# Patient Record
Sex: FEMALE | Race: WHITE | NOT HISPANIC OR LATINO | Employment: UNEMPLOYED | URBAN - METROPOLITAN AREA
[De-identification: names, ages, dates, MRNs, and addresses within clinical notes are randomized per-mention and may not be internally consistent; named-entity substitution may affect disease eponyms.]

---

## 2023-04-18 ENCOUNTER — OFFICE VISIT (OUTPATIENT)
Dept: PRIMARY CARE | Facility: CLINIC | Age: 88
End: 2023-04-18
Payer: MEDICARE

## 2023-04-18 VITALS
HEART RATE: 82 BPM | OXYGEN SATURATION: 98 % | WEIGHT: 103.6 LBS | BODY MASS INDEX: 21.65 KG/M2 | RESPIRATION RATE: 16 BRPM

## 2023-04-18 DIAGNOSIS — R53.83 FATIGUE, UNSPECIFIED TYPE: Primary | ICD-10-CM

## 2023-04-18 DIAGNOSIS — E78.9 LIPID DISORDER: ICD-10-CM

## 2023-04-18 PROCEDURE — 1159F MED LIST DOCD IN RCRD: CPT | Performed by: INTERNAL MEDICINE

## 2023-04-18 PROCEDURE — 1160F RVW MEDS BY RX/DR IN RCRD: CPT | Performed by: INTERNAL MEDICINE

## 2023-04-18 PROCEDURE — 99214 OFFICE O/P EST MOD 30 MIN: CPT | Performed by: INTERNAL MEDICINE

## 2023-04-18 RX ORDER — AMLODIPINE BESYLATE 10 MG/1
10 TABLET ORAL DAILY
COMMUNITY
End: 2023-05-05

## 2023-04-18 RX ORDER — APIXABAN 5 MG/1
2.5 TABLET, FILM COATED ORAL 2 TIMES DAILY
COMMUNITY
End: 2023-05-05

## 2023-04-18 RX ORDER — ATORVASTATIN CALCIUM 40 MG/1
40 TABLET, FILM COATED ORAL DAILY
COMMUNITY
End: 2023-05-03 | Stop reason: SDUPTHER

## 2023-04-18 ASSESSMENT — ENCOUNTER SYMPTOMS
OCCASIONAL FEELINGS OF UNSTEADINESS: 0
DEPRESSION: 0
LOSS OF SENSATION IN FEET: 0

## 2023-04-18 NOTE — PROGRESS NOTES
Subjective   Patient ID: Marian Fritz is a 89 y.o. female who presents for Follow-up.    HTN    HLD    CVA -Eliquis 2.5 mg BID    STML        HPI             Review of Systems      No Fever/chills/headaches/dizziness/chest pains/ shortness of breath/palpitations/Nausea/vomiting/diarrhea/ constipation/urine frequency/blood in urine.      Objective     130/80    Pulse 82   Resp 16   Wt 47 kg (103 lb 9.6 oz)   SpO2 98%   BMI 21.65 kg/m²     Physical Exam    No JVP elevation. No palpable Lymph Nodes. No Thyromegaly    CVS-NL S1/S2 . No MRG    Lungs-CTA. B/S= B/L    Abdomen-Soft, Non-tender. No masses or HSM    Extremities: No C/C/E      Assessment/Plan        HTN    HLD    CVA -Eliquis 2.5 mg BID    STML    Plan:    Continue with current Rx    Follow up in 3 months/PRN

## 2023-05-02 DIAGNOSIS — E78.00 ELEVATED CHOLESTEROL: ICD-10-CM

## 2023-05-02 DIAGNOSIS — Z92.29 HX OF LONG TERM USE OF BLOOD THINNERS: ICD-10-CM

## 2023-05-02 DIAGNOSIS — I10 HYPERTENSION, UNSPECIFIED TYPE: ICD-10-CM

## 2023-05-02 NOTE — TELEPHONE ENCOUNTER
Patient daughter called she was suppose to get a refill on atorvastatin 40 mg and furosemide 20 mg. Also her prescription on two of her medications were supposed to be changed to a lower dosage the daughter has been cutting them in half. The medications are eliquis 5mg and amlodepine 10mg.   [facility] [place].

## 2023-05-03 RX ORDER — FUROSEMIDE 20 MG/1
20 TABLET ORAL EVERY OTHER DAY
COMMUNITY
End: 2023-05-03 | Stop reason: SDUPTHER

## 2023-05-03 RX ORDER — ATORVASTATIN CALCIUM 40 MG/1
40 TABLET, FILM COATED ORAL DAILY
Qty: 30 TABLET | Refills: 2 | Status: SHIPPED | OUTPATIENT
Start: 2023-05-03 | End: 2023-05-05 | Stop reason: SDUPTHER

## 2023-05-03 RX ORDER — FUROSEMIDE 20 MG/1
20 TABLET ORAL EVERY OTHER DAY
Qty: 15 TABLET | Refills: 2 | Status: SHIPPED | OUTPATIENT
Start: 2023-05-03 | End: 2023-05-05 | Stop reason: SDUPTHER

## 2023-05-05 DIAGNOSIS — Z92.29 HX OF LONG TERM USE OF BLOOD THINNERS: ICD-10-CM

## 2023-05-05 DIAGNOSIS — E78.00 ELEVATED CHOLESTEROL: ICD-10-CM

## 2023-05-05 DIAGNOSIS — I10 HYPERTENSION, UNSPECIFIED TYPE: ICD-10-CM

## 2023-05-05 DIAGNOSIS — I10 PRIMARY HYPERTENSION: Primary | ICD-10-CM

## 2023-05-05 RX ORDER — AMLODIPINE BESYLATE 5 MG/1
5 TABLET ORAL DAILY
Qty: 90 TABLET | Refills: 3 | Status: SHIPPED | OUTPATIENT
Start: 2023-05-05 | End: 2023-10-24

## 2023-05-05 RX ORDER — ATORVASTATIN CALCIUM 40 MG/1
40 TABLET, FILM COATED ORAL DAILY
Qty: 30 TABLET | Refills: 2 | Status: SHIPPED | OUTPATIENT
Start: 2023-05-05 | End: 2023-07-24 | Stop reason: SDUPTHER

## 2023-05-05 RX ORDER — FUROSEMIDE 20 MG/1
20 TABLET ORAL EVERY OTHER DAY
Qty: 15 TABLET | Refills: 2 | Status: SHIPPED | OUTPATIENT
Start: 2023-05-05 | End: 2023-07-24 | Stop reason: SDUPTHER

## 2023-05-05 RX ORDER — ATORVASTATIN CALCIUM 40 MG/1
40 TABLET, FILM COATED ORAL DAILY
Qty: 90 TABLET | Refills: 0 | OUTPATIENT
Start: 2023-05-05 | End: 2023-08-03

## 2023-05-05 RX ORDER — FUROSEMIDE 20 MG/1
20 TABLET ORAL EVERY OTHER DAY
Qty: 45 TABLET | Refills: 0 | OUTPATIENT
Start: 2023-05-05 | End: 2023-08-03

## 2023-05-05 RX ORDER — AMLODIPINE BESYLATE 5 MG/1
5 TABLET ORAL DAILY
Qty: 90 TABLET | Refills: 0 | OUTPATIENT
Start: 2023-05-05 | End: 2024-05-04

## 2023-07-18 ENCOUNTER — OFFICE VISIT (OUTPATIENT)
Dept: PRIMARY CARE | Facility: CLINIC | Age: 88
End: 2023-07-18
Payer: MEDICARE

## 2023-07-18 VITALS — BODY MASS INDEX: 21.11 KG/M2 | WEIGHT: 101 LBS

## 2023-07-18 DIAGNOSIS — I48.11 LONGSTANDING PERSISTENT ATRIAL FIBRILLATION (MULTI): Primary | ICD-10-CM

## 2023-07-18 DIAGNOSIS — Z95.0 PACEMAKER: ICD-10-CM

## 2023-07-18 DIAGNOSIS — I10 BENIGN ESSENTIAL HTN: ICD-10-CM

## 2023-07-18 PROBLEM — Z85.3 PERSONAL HISTORY OF MALIGNANT NEOPLASM OF BREAST: Status: ACTIVE | Noted: 2023-07-18

## 2023-07-18 PROBLEM — I07.1 TRICUSPID VALVE REGURGITATION: Status: ACTIVE | Noted: 2023-07-18

## 2023-07-18 PROBLEM — R63.4 WEIGHT LOSS: Status: ACTIVE | Noted: 2023-07-18

## 2023-07-18 PROBLEM — R05.8 UPPER AIRWAY COUGH SYNDROME: Status: ACTIVE | Noted: 2023-07-18

## 2023-07-18 PROBLEM — M17.0 OSTEOARTHRITIS OF KNEES, BILATERAL: Status: ACTIVE | Noted: 2023-07-18

## 2023-07-18 PROBLEM — K21.9 ACID REFLUX: Status: ACTIVE | Noted: 2023-07-18

## 2023-07-18 PROBLEM — E87.5 HYPERKALEMIA: Status: ACTIVE | Noted: 2023-07-18

## 2023-07-18 PROBLEM — M16.11 OSTEOARTHRITIS OF RIGHT HIP: Status: ACTIVE | Noted: 2023-07-18

## 2023-07-18 PROBLEM — R05.9 COUGH: Status: ACTIVE | Noted: 2023-07-18

## 2023-07-18 PROBLEM — N94.89 ADNEXAL MASS: Status: ACTIVE | Noted: 2023-07-18

## 2023-07-18 PROBLEM — I50.9 CHF (CONGESTIVE HEART FAILURE) (MULTI): Status: ACTIVE | Noted: 2023-07-18

## 2023-07-18 PROBLEM — H61.92 LESION OF SKIN OF LEFT EAR: Status: ACTIVE | Noted: 2023-07-18

## 2023-07-18 PROBLEM — G40.109 PARTIAL SEIZURE, MOTOR (MULTI): Status: ACTIVE | Noted: 2023-07-18

## 2023-07-18 PROBLEM — M70.62 GREATER TROCHANTERIC BURSITIS, LEFT: Status: ACTIVE | Noted: 2023-07-18

## 2023-07-18 PROBLEM — I63.9 CVA (CEREBRAL VASCULAR ACCIDENT) (MULTI): Status: ACTIVE | Noted: 2023-07-18

## 2023-07-18 PROBLEM — M85.80 OSTEOPENIA: Status: ACTIVE | Noted: 2023-07-18

## 2023-07-18 PROBLEM — R19.00 PELVIC MASS: Status: ACTIVE | Noted: 2023-07-18

## 2023-07-18 PROBLEM — R79.89 ELEVATED TSH: Status: ACTIVE | Noted: 2023-07-18

## 2023-07-18 PROBLEM — H90.3 SENSORINEURAL HEARING LOSS (SNHL) OF BOTH EARS: Status: ACTIVE | Noted: 2023-07-18

## 2023-07-18 PROBLEM — M19.90 UNSPECIFIED OSTEOARTHRITIS, UNSPECIFIED SITE: Status: ACTIVE | Noted: 2023-07-18

## 2023-07-18 PROBLEM — I44.30 ATRIOVENTRICULAR BLOCK: Status: ACTIVE | Noted: 2023-07-18

## 2023-07-18 PROBLEM — R60.9 EDEMA: Status: ACTIVE | Noted: 2023-07-18

## 2023-07-18 PROBLEM — J18.9 PNEUMONIA: Status: ACTIVE | Noted: 2023-07-18

## 2023-07-18 PROBLEM — G31.84 MILD COGNITIVE IMPAIRMENT: Status: ACTIVE | Noted: 2023-07-18

## 2023-07-18 PROBLEM — I48.91 A-FIB (MULTI): Status: ACTIVE | Noted: 2023-07-18

## 2023-07-18 PROBLEM — J90 PLEURAL EFFUSION: Status: ACTIVE | Noted: 2023-07-18

## 2023-07-18 PROBLEM — R26.9 GAIT ABNORMALITY: Status: ACTIVE | Noted: 2023-07-18

## 2023-07-18 PROBLEM — M25.551 RIGHT HIP PAIN: Status: ACTIVE | Noted: 2023-07-18

## 2023-07-18 PROBLEM — M25.569 KNEE PAIN: Status: ACTIVE | Noted: 2023-07-18

## 2023-07-18 PROBLEM — I44.39 HIGH DEGREE ATRIOVENTRICULAR BLOCK: Status: ACTIVE | Noted: 2023-07-18

## 2023-07-18 PROBLEM — I63.9 EMBOLIC STROKE (MULTI): Status: ACTIVE | Noted: 2023-07-18

## 2023-07-18 PROBLEM — I63.513: Status: ACTIVE | Noted: 2023-07-18

## 2023-07-18 PROBLEM — M79.89 SWELLING OF EXTREMITY: Status: ACTIVE | Noted: 2023-07-18

## 2023-07-18 PROBLEM — I82.409 DVT (DEEP VENOUS THROMBOSIS) (MULTI): Status: ACTIVE | Noted: 2023-07-18

## 2023-07-18 PROBLEM — R47.81 DEFICIT IN COMMUNICATION DUE TO SLURRED SPEECH: Status: ACTIVE | Noted: 2023-07-18

## 2023-07-18 PROBLEM — M79.652 PAIN OF LEFT LATERAL UPPER THIGH: Status: ACTIVE | Noted: 2023-07-18

## 2023-07-18 PROBLEM — S72.142D: Status: ACTIVE | Noted: 2023-07-18

## 2023-07-18 PROBLEM — R42 DIZZINESS: Status: ACTIVE | Noted: 2023-07-18

## 2023-07-18 PROBLEM — R56.9 SEIZURE (MULTI): Status: ACTIVE | Noted: 2023-07-18

## 2023-07-18 PROBLEM — M54.50 LOW BACK PAIN: Status: ACTIVE | Noted: 2023-07-18

## 2023-07-18 PROBLEM — M79.10 MYALGIA: Status: ACTIVE | Noted: 2023-07-18

## 2023-07-18 PROBLEM — R07.89 ATYPICAL CHEST PAIN: Status: ACTIVE | Noted: 2023-07-18

## 2023-07-18 PROBLEM — E78.5 HYPERLIPIDEMIA: Status: ACTIVE | Noted: 2023-07-18

## 2023-07-18 PROBLEM — M65.332 TRIGGER MIDDLE FINGER OF LEFT HAND: Status: ACTIVE | Noted: 2023-07-18

## 2023-07-18 PROBLEM — M70.70 HIP BURSITIS: Status: ACTIVE | Noted: 2023-07-18

## 2023-07-18 PROBLEM — E83.52 SERUM CALCIUM ELEVATED: Status: ACTIVE | Noted: 2023-07-18

## 2023-07-18 PROBLEM — I50.33 ACUTE ON CHRONIC DIASTOLIC CONGESTIVE HEART FAILURE (MULTI): Status: ACTIVE | Noted: 2023-07-18

## 2023-07-18 PROCEDURE — 1160F RVW MEDS BY RX/DR IN RCRD: CPT | Performed by: INTERNAL MEDICINE

## 2023-07-18 PROCEDURE — 1159F MED LIST DOCD IN RCRD: CPT | Performed by: INTERNAL MEDICINE

## 2023-07-18 PROCEDURE — 99214 OFFICE O/P EST MOD 30 MIN: CPT | Performed by: INTERNAL MEDICINE

## 2023-07-18 PROCEDURE — 1126F AMNT PAIN NOTED NONE PRSNT: CPT | Performed by: INTERNAL MEDICINE

## 2023-07-18 RX ORDER — CARVEDILOL 6.25 MG/1
TABLET ORAL
COMMUNITY
End: 2023-07-29 | Stop reason: SDUPTHER

## 2023-07-18 RX ORDER — ELECTROLYTES/DEXTROSE
SOLUTION, ORAL ORAL
Status: ON HOLD | COMMUNITY
Start: 2016-05-16

## 2023-07-18 RX ORDER — EPINEPHRINE 0.22MG
AEROSOL WITH ADAPTER (ML) INHALATION
COMMUNITY
Start: 2016-05-16 | End: 2023-10-24

## 2023-07-18 RX ORDER — POTASSIUM CHLORIDE 750 MG/1
10 TABLET, EXTENDED RELEASE ORAL EVERY OTHER DAY
COMMUNITY
End: 2023-07-29 | Stop reason: SDUPTHER

## 2023-07-18 RX ORDER — LEVETIRACETAM 750 MG/1
750 TABLET ORAL 2 TIMES DAILY
COMMUNITY
End: 2023-07-29 | Stop reason: SDUPTHER

## 2023-07-18 RX ORDER — CHOLECALCIFEROL (VITAMIN D3) 25 MCG
1 TABLET ORAL DAILY
Status: ON HOLD | COMMUNITY
Start: 2014-12-01

## 2023-07-18 RX ORDER — LOSARTAN POTASSIUM 50 MG/1
50 TABLET ORAL DAILY
COMMUNITY
End: 2023-07-29 | Stop reason: SDUPTHER

## 2023-07-18 RX ORDER — PANTOPRAZOLE SODIUM 20 MG/1
20 TABLET, DELAYED RELEASE ORAL DAILY
COMMUNITY
End: 2023-07-29 | Stop reason: SDUPTHER

## 2023-07-18 NOTE — PROGRESS NOTES
Subjective   Patient ID: Marian Fritz is a 89 y.o. female who presents for Follow-up (3 months).    Afib    HTN    ANGEL      HPI     Review of Systems      No Fever/chills/headaches/dizziness/chest pains/ shortness of breath/palpitations/Nausea/vomiting/diarrhea/ constipation/urine frequency/blood in urine.      Objective   Wt 45.8 kg (101 lb)   BMI 21.11 kg/m²     Physical Exam    No JVP elevation. No palpable Lymph Nodes. No Thyromegaly    CVS-NL S1/S2 . No MRG    Lungs-CTA. B/S= B/L    Abdomen-Soft, Non-tender. No masses or HSM    Extremities: No C/C/E      Assessment/Plan     Afib    HTN    ANGEL    Plan:    Continue with current Rx    Follow up 3 months/PRN

## 2023-07-24 DIAGNOSIS — Z92.29 HX OF LONG TERM USE OF BLOOD THINNERS: ICD-10-CM

## 2023-07-24 DIAGNOSIS — E78.00 ELEVATED CHOLESTEROL: ICD-10-CM

## 2023-07-24 RX ORDER — FUROSEMIDE 20 MG/1
20 TABLET ORAL EVERY OTHER DAY
Qty: 15 TABLET | Refills: 2 | Status: SHIPPED | OUTPATIENT
Start: 2023-07-24 | End: 2023-10-24

## 2023-07-24 RX ORDER — ATORVASTATIN CALCIUM 40 MG/1
40 TABLET, FILM COATED ORAL DAILY
Qty: 30 TABLET | Refills: 2 | Status: SHIPPED | OUTPATIENT
Start: 2023-07-24 | End: 2023-10-30 | Stop reason: SDUPTHER

## 2023-07-29 DIAGNOSIS — I10 BENIGN ESSENTIAL HTN: ICD-10-CM

## 2023-07-29 DIAGNOSIS — R56.9 SEIZURE (MULTI): Primary | ICD-10-CM

## 2023-07-29 DIAGNOSIS — K21.9 GASTROESOPHAGEAL REFLUX DISEASE WITHOUT ESOPHAGITIS: ICD-10-CM

## 2023-07-30 RX ORDER — CARVEDILOL 6.25 MG/1
TABLET ORAL
Qty: 180 TABLET | Refills: 3 | Status: SHIPPED | OUTPATIENT
Start: 2023-07-30 | End: 2024-01-22 | Stop reason: SDUPTHER

## 2023-07-30 RX ORDER — LOSARTAN POTASSIUM 50 MG/1
50 TABLET ORAL DAILY
Qty: 90 TABLET | Refills: 3 | Status: SHIPPED | OUTPATIENT
Start: 2023-07-30 | End: 2024-01-22 | Stop reason: SDUPTHER

## 2023-07-30 RX ORDER — LEVETIRACETAM 750 MG/1
750 TABLET ORAL 2 TIMES DAILY
Qty: 180 TABLET | Refills: 3 | Status: SHIPPED | OUTPATIENT
Start: 2023-07-30 | End: 2024-01-22 | Stop reason: SDUPTHER

## 2023-07-30 RX ORDER — PANTOPRAZOLE SODIUM 20 MG/1
20 TABLET, DELAYED RELEASE ORAL DAILY
Qty: 90 TABLET | Refills: 3 | Status: SHIPPED | OUTPATIENT
Start: 2023-07-30 | End: 2024-01-22 | Stop reason: SDUPTHER

## 2023-07-30 RX ORDER — POTASSIUM CHLORIDE 750 MG/1
10 TABLET, EXTENDED RELEASE ORAL EVERY OTHER DAY
Qty: 45 TABLET | Refills: 3 | Status: SHIPPED | OUTPATIENT
Start: 2023-07-30 | End: 2024-01-22 | Stop reason: SDUPTHER

## 2023-09-24 PROBLEM — D48.5 NEOPLASM OF UNCERTAIN BEHAVIOR OF SKIN: Status: ACTIVE | Noted: 2018-10-24

## 2023-09-24 PROBLEM — L82.1 OTHER SEBORRHEIC KERATOSIS: Status: ACTIVE | Noted: 2018-10-24

## 2023-09-24 PROBLEM — M81.0 OSTEOPOROSIS: Status: ACTIVE | Noted: 2023-09-24

## 2023-09-24 PROBLEM — L57.9 SKIN CHANGES DUE TO CHRONIC EXPOSURE TO NONIONIZING RADIATION, UNSPECIFIED: Status: ACTIVE | Noted: 2018-10-24

## 2023-09-24 PROBLEM — D18.01 HEMANGIOMA OF SKIN AND SUBCUTANEOUS TISSUE: Status: ACTIVE | Noted: 2018-10-24

## 2023-09-24 RX ORDER — ALENDRONATE SODIUM 70 MG/1
70 TABLET ORAL WEEKLY
COMMUNITY
Start: 2011-12-27 | End: 2023-10-24 | Stop reason: WASHOUT

## 2023-09-24 RX ORDER — CLOPIDOGREL BISULFATE 75 MG/1
75 TABLET ORAL
COMMUNITY
Start: 2018-09-17 | End: 2023-10-24 | Stop reason: WASHOUT

## 2023-09-24 RX ORDER — BUTALB/ACETAMINOPHEN/CAFFEINE 50-325-40
1 TABLET ORAL 3 TIMES DAILY
COMMUNITY
Start: 2003-04-29 | End: 2023-10-24 | Stop reason: WASHOUT

## 2023-09-24 RX ORDER — VITS A,C,E/LUTEIN/MINERALS 300MCG-200
2 TABLET ORAL DAILY
COMMUNITY
Start: 2014-12-01 | End: 2023-10-24 | Stop reason: WASHOUT

## 2023-09-24 RX ORDER — LISINOPRIL 40 MG/1
40 TABLET ORAL
COMMUNITY
Start: 2011-11-07 | End: 2023-10-24 | Stop reason: WASHOUT

## 2023-09-24 RX ORDER — ASPIRIN 325 MG
325 TABLET, DELAYED RELEASE (ENTERIC COATED) ORAL
COMMUNITY
Start: 2011-09-09 | End: 2023-10-24 | Stop reason: ALTCHOICE

## 2023-09-24 RX ORDER — LEVETIRACETAM 250 MG/1
250 TABLET ORAL 2 TIMES DAILY
COMMUNITY
Start: 2011-09-09 | End: 2023-10-24 | Stop reason: WASHOUT

## 2023-09-24 RX ORDER — VIT A/VIT C/VIT E/ZINC/COPPER 4296-226
1 CAPSULE ORAL 2 TIMES DAILY
Status: ON HOLD | COMMUNITY

## 2023-09-24 RX ORDER — NAPROXEN 500 MG/1
500 TABLET ORAL 2 TIMES DAILY PRN
COMMUNITY
Start: 2011-11-07 | End: 2023-10-24 | Stop reason: WASHOUT

## 2023-09-24 RX ORDER — ASPIRIN 81 MG/1
1 TABLET ORAL DAILY
COMMUNITY
End: 2023-10-24 | Stop reason: ALTCHOICE

## 2023-09-24 RX ORDER — HYDROCHLOROTHIAZIDE 25 MG/1
25 TABLET ORAL
COMMUNITY
Start: 2011-07-15 | End: 2023-10-24 | Stop reason: WASHOUT

## 2023-09-24 RX ORDER — ATORVASTATIN CALCIUM 10 MG/1
10 TABLET, FILM COATED ORAL
COMMUNITY
Start: 2018-09-10 | End: 2023-10-24 | Stop reason: WASHOUT

## 2023-09-24 RX ORDER — ATORVASTATIN CALCIUM 20 MG/1
20 TABLET, FILM COATED ORAL DAILY
COMMUNITY
Start: 2011-01-25 | End: 2023-10-24 | Stop reason: WASHOUT

## 2023-09-24 RX ORDER — ATENOLOL 100 MG/1
100 TABLET ORAL
COMMUNITY
Start: 2011-11-07 | End: 2023-10-24 | Stop reason: ALTCHOICE

## 2023-09-24 RX ORDER — MELOXICAM 7.5 MG/1
7.5 TABLET ORAL DAILY PRN
COMMUNITY
End: 2023-10-24 | Stop reason: WASHOUT

## 2023-10-18 ENCOUNTER — APPOINTMENT (OUTPATIENT)
Dept: PRIMARY CARE | Facility: CLINIC | Age: 88
End: 2023-10-18
Payer: MEDICARE

## 2023-10-20 ENCOUNTER — OFFICE VISIT (OUTPATIENT)
Dept: PRIMARY CARE | Facility: CLINIC | Age: 88
End: 2023-10-20
Payer: MEDICARE

## 2023-10-20 ENCOUNTER — TELEPHONE (OUTPATIENT)
Dept: PRIMARY CARE | Facility: CLINIC | Age: 88
End: 2023-10-20

## 2023-10-20 VITALS
RESPIRATION RATE: 18 BRPM | OXYGEN SATURATION: 97 % | HEART RATE: 71 BPM | WEIGHT: 99.8 LBS | BODY MASS INDEX: 20.86 KG/M2

## 2023-10-20 DIAGNOSIS — I63.9 CEREBROVASCULAR ACCIDENT (CVA), UNSPECIFIED MECHANISM (MULTI): Primary | ICD-10-CM

## 2023-10-20 DIAGNOSIS — E78.00 ELEVATED CHOLESTEROL: ICD-10-CM

## 2023-10-20 DIAGNOSIS — I10 BENIGN ESSENTIAL HTN: ICD-10-CM

## 2023-10-20 DIAGNOSIS — Z23 ENCOUNTER FOR IMMUNIZATION: ICD-10-CM

## 2023-10-20 DIAGNOSIS — E78.9 LIPID DISORDER: ICD-10-CM

## 2023-10-20 PROCEDURE — 99214 OFFICE O/P EST MOD 30 MIN: CPT | Performed by: INTERNAL MEDICINE

## 2023-10-20 PROCEDURE — G0008 ADMIN INFLUENZA VIRUS VAC: HCPCS | Performed by: INTERNAL MEDICINE

## 2023-10-20 PROCEDURE — 3078F DIAST BP <80 MM HG: CPT | Performed by: INTERNAL MEDICINE

## 2023-10-20 PROCEDURE — 90662 IIV NO PRSV INCREASED AG IM: CPT | Performed by: INTERNAL MEDICINE

## 2023-10-20 PROCEDURE — 1159F MED LIST DOCD IN RCRD: CPT | Performed by: INTERNAL MEDICINE

## 2023-10-20 PROCEDURE — 1160F RVW MEDS BY RX/DR IN RCRD: CPT | Performed by: INTERNAL MEDICINE

## 2023-10-20 PROCEDURE — 3074F SYST BP LT 130 MM HG: CPT | Performed by: INTERNAL MEDICINE

## 2023-10-20 PROCEDURE — 1126F AMNT PAIN NOTED NONE PRSNT: CPT | Performed by: INTERNAL MEDICINE

## 2023-10-20 NOTE — PROGRESS NOTES
Subjective   Patient ID: Marian Fritz is a 89 y.o. female who presents for Follow-up (3 months).    No medical changes since her last office visit-     HPI     HTN    HLD    H/o Seizures-       Review of Systems    [place]    No Fever/chills/headaches/dizziness/chest pains/ cough/ shortness of breath/palpitations/Nausea/vomiting/diarrhea/ constipation/urine frequency/blood in urine.      Objective   Pulse 71   Resp 18   Wt 45.3 kg (99 lb 12.8 oz)   SpO2 97%   BMI 20.86 kg/m²     Physical Exam    No JVP elevation. No palpable Lymph Nodes. No Thyromegaly    HEENT- Negative    CVS-NL S1/S2 . No MRG    Lungs-CTA. B/S= B/L    Abdomen-Soft, Non-tender. No masses or HSM    Extremities: No C/C/E        Assessment/Plan     HTN    HLD    CVA    H/o Seizures-     Plan:    Continue with current Rx

## 2023-10-24 ENCOUNTER — OFFICE VISIT (OUTPATIENT)
Dept: CARDIOLOGY | Facility: CLINIC | Age: 88
End: 2023-10-24
Payer: MEDICARE

## 2023-10-24 VITALS
HEART RATE: 73 BPM | DIASTOLIC BLOOD PRESSURE: 68 MMHG | WEIGHT: 101.31 LBS | OXYGEN SATURATION: 96 % | BODY MASS INDEX: 21.26 KG/M2 | HEIGHT: 58 IN | SYSTOLIC BLOOD PRESSURE: 121 MMHG

## 2023-10-24 DIAGNOSIS — I48.91 ATRIAL FIBRILLATION, UNSPECIFIED TYPE (MULTI): Primary | ICD-10-CM

## 2023-10-24 PROCEDURE — 99214 OFFICE O/P EST MOD 30 MIN: CPT | Performed by: INTERNAL MEDICINE

## 2023-10-24 PROCEDURE — 3074F SYST BP LT 130 MM HG: CPT | Performed by: INTERNAL MEDICINE

## 2023-10-24 PROCEDURE — 1159F MED LIST DOCD IN RCRD: CPT | Performed by: INTERNAL MEDICINE

## 2023-10-24 PROCEDURE — 99214 OFFICE O/P EST MOD 30 MIN: CPT | Mod: PO | Performed by: INTERNAL MEDICINE

## 2023-10-24 PROCEDURE — 1126F AMNT PAIN NOTED NONE PRSNT: CPT | Performed by: INTERNAL MEDICINE

## 2023-10-24 PROCEDURE — 3078F DIAST BP <80 MM HG: CPT | Performed by: INTERNAL MEDICINE

## 2023-10-24 PROCEDURE — 1160F RVW MEDS BY RX/DR IN RCRD: CPT | Performed by: INTERNAL MEDICINE

## 2023-10-24 ASSESSMENT — COLUMBIA-SUICIDE SEVERITY RATING SCALE - C-SSRS
2. HAVE YOU ACTUALLY HAD ANY THOUGHTS OF KILLING YOURSELF?: NO
1. IN THE PAST MONTH, HAVE YOU WISHED YOU WERE DEAD OR WISHED YOU COULD GO TO SLEEP AND NOT WAKE UP?: NO

## 2023-10-24 ASSESSMENT — ENCOUNTER SYMPTOMS
OCCASIONAL FEELINGS OF UNSTEADINESS: 0
DEPRESSION: 0
LOSS OF SENSATION IN FEET: 0

## 2023-10-24 ASSESSMENT — PAIN SCALES - GENERAL: PAINLEVEL: 0-NO PAIN

## 2023-10-24 NOTE — PROGRESS NOTES
Primary Care Physician: Pro Gomez MD  Date of Visit: 10/24/2023  1:40 PM EDT  Location of visit: [facility] [place] [address]     Chief Complaint:   Chief Complaint   Patient presents with    Follow-up        HPI / Summary:   Marian Fritz is a 89 y.o. female presents for followup.     89-year-old history of A-fib, pacemaker, TIA, sensorineural hearing loss, diastolic heart failure, TR, pulmonary hypertension.  She lives with one of her daughters no recent falls no recent clinical bleeding remains adherent to prescribed meds        Specialty Problems          Cardiology Problems    A-fib (CMS/HCC)    Acute on chronic diastolic congestive heart failure (CMS/HCC)    Atrioventricular block    Benign essential HTN    CHF (congestive heart failure) (CMS/HCC)    DVT (deep venous thrombosis) (CMS/HCC)    High degree atrioventricular block    Hyperlipidemia    Pacemaker    Tricuspid valve regurgitation        Past Medical History:   Diagnosis Date    Personal history of other diseases of the circulatory system     History of hypertension    Personal history of other diseases of the nervous system and sense organs 12/16/2013    History of complex partial epilepsy    Pure hypercholesterolemia, unspecified     High cholesterol    Unspecified cataract     Cataracts, both eyes    Unspecified convulsions (CMS/HCC)     Seizures          Past Surgical History:   Procedure Laterality Date    CATARACT EXTRACTION  12/01/2014    Cataract Surgery    CT HEAD ANGIO W AND WO IV CONTRAST  9/16/2018    CT HEAD ANGIO W AND WO IV CONTRAST 9/16/2018 [facility] EMERGENCY LEGACY    CT NECK ANGIO W AND WO IV CONTRAST  9/16/2018    CT NECK ANGIO W AND WO IV CONTRAST 9/16/2018 [facility] EMERGENCY LEGACY    MR HEAD ANGIO WO IV CONTRAST  11/28/2018    MR HEAD ANGIO WO IV CONTRAST 11/28/2018 [facility] [place] INPATIENT LEGACY    MR NECK ANGIO WO IV CONTRAST  11/28/2018    MR NECK ANGIO WO IV CONTRAST 11/28/2018 [facility] [place] INPATIENT LEGACY    OTHER SURGICAL HISTORY  12/01/2014    Simple  Mastectomy Right Breast          Social History:   reports that she has never smoked. She has never used smokeless tobacco.      Allergies:  Allergies   Allergen Reactions    Amlodipine Other     Leg edema    Bee Venom Protein (Honey Bee) Other       Outpatient Medications:  Current Outpatient Medications   Medication Instructions    amLODIPine (NORVASC) 5 mg, oral, Daily    apixaban (ELIQUIS) 2.5 mg, oral, 2 times daily    atorvastatin (LIPITOR) 40 mg, oral, Daily    atorvastatin (LIPITOR) 10 mg, oral, Daily RT    atorvastatin (LIPITOR) 20 mg, oral, Daily    biotin 5 mg capsule oral    calcium citrate-vitamin D3 (Citracal+D) 315 mg-5 mcg (200 unit) tablet 1 tablet, 3 times daily    carvedilol (Coreg) 6.25 mg tablet take one tablet every morning and evening.    cholecalciferol (Vitamin D-3) 25 MCG (1000 UT) tablet 1 tablet, oral, Daily    clopidogrel (PLAVIX) 75 mg, oral, Daily RT    coenzyme Q-10 100 mg capsule oral    diclofenac sodium 4 g, 4 times daily,  APPLY TO LOWER EXTREMITIES. DO NOT APPLY MORE THAN 16 GM DAILY TO ANY ONE AFFECTED JOINT.    furosemide (LASIX) 20 mg, oral, Every other day    hydroCHLOROthiazide (HYDRODIURIL) 25 mg, oral, Daily RT    levETIRAcetam (KEPPRA) 750 mg, oral, 2 times daily    levETIRAcetam (KEPPRA) 250 mg, oral, 2 times daily    lisinopril 40 mg, oral, Daily RT    losartan (COZAAR) 50 mg, oral, Daily    meloxicam (MOBIC) 7.5 mg, oral, Daily PRN    naproxen (NAPROSYN) 500 mg, oral, 2 times daily PRN, TAKE WITH FOOD.    pantoprazole (PROTONIX) 20 mg, oral, Daily    potassium chloride CR 10 mEq ER tablet 10 mEq, oral, Every other day    vit A,C and E-lutein-minerals (Ocuvite with Lutein) 300 mcg-200 mg-27 mg-2 mg tablet 2 tablets, oral, Daily    vitamins A,C,E-zinc-copper (PreserVision AREDS) 4,296 mcg-226 mg-90 mg capsule 1 capsule, oral, 2 times daily       ROS     Physical Exam:  Vitals:    10/24/23 1411   BP: 121/68   BP Location: Left arm   Patient Position: Sitting   Pulse: 73  "  SpO2: 96%   Weight: 46 kg (101 lb 5 oz)   Height: 1.473 m (4' 10\")     Wt Readings from Last 5 Encounters:   10/24/23 46 kg (101 lb 5 oz)   10/20/23 45.3 kg (99 lb 12.8 oz)   07/18/23 45.8 kg (101 lb)   04/24/23 45.8 kg (101 lb)   04/18/23 47 kg (103 lb 9.6 oz)     Body mass index is 21.17 kg/m².   No acute distress.  Flat JVP.  Regular rhythm.  Clear lungs.  Soft abdomen.  Trace ankle edema     Last Labs:  CMP:  Recent Labs     10/06/22  1439 06/16/22  0555 06/13/22  1145 06/12/22  0644 06/11/22  0643    140 138 140 138   K 3.6 3.9 3.7 3.5 3.5    98 96* 100 100   CO2 32 32 33* 30 28   ANIONGAP 13 14 13 14 14   BUN 10 15 20 15 17   CREATININE 0.84 0.93 1.03 0.89 0.89   GLUCOSE 102* 79 126* 99 104*     Recent Labs     10/06/22  1439 06/13/22  1145 06/08/22  1637 08/27/21  1032 11/30/20  1327   ALBUMIN 4.0 3.3* 3.5 3.8 3.7   ALKPHOS 81 55 55 66 49   ALT 12 21 18 10 13   AST 13 20 17 14 12   BILITOT 0.9 0.7 1.0 0.7 0.7     CBC:  Recent Labs     10/06/22  1439 06/16/22  0555 06/13/22  1145 06/12/22  0644 06/11/22  0643   WBC 9.5 7.5 10.0 7.2 8.6   HGB 15.0 13.7 13.9 14.2 13.7   HCT 45.2 41.5 42.5 43.9 41.7    271 267 239 240   MCV 94 95 94 96 95     COAG:   Recent Labs     10/06/22  1615 07/01/19  1124 05/10/19  1449 11/27/18  0607 11/22/18  0545   INR 1.9* 1.3* 1.2* 1.2* 1.3*     HEME/ENDO:  Recent Labs     06/09/22  0554 06/08/22  1637 08/27/21  1032 11/30/20  1327 10/04/19  1239 07/02/19  0555 07/01/19  1124 05/11/19  0616 12/17/18  0700 09/17/18  0620   IRONSAT  --   --   --   --   --   --   --   --  18*  --    TSH  --  1.71 2.17 2.43 3.21  --    < >  --   --   --    HGBA1C 6.5*  --   --   --   --  5.4  --  5.8  --  5.7    < > = values in this interval not displayed.      CARDIAC:   Recent Labs     10/06/22  1615 10/06/22  1439 06/09/22  0554 06/08/22  1637 07/02/19  0555 07/01/19  1124 12/17/18  0700 12/10/18  0548 09/16/18  1332 01/16/18  1250   LDH  --   --   --   --   --   --   --   --   --  " 236   TROPHS 13 14* 47* 36*  --   --   --   --   --   --    BNP  --  218*  --   --  622* 577* 402* 442*   < >  --     < > = values in this interval not displayed.     Recent Labs     06/09/22  0554 08/27/21  1032 11/30/20  1327   CHOL 152 157 194   LDLF 79 79 91   HDL 55.0 57.3 66.0   TRIG 89 103 183*       Last Cardiology Tests:  ECG:      Echo:  Echo Results:  No results found for this or any previous visit from the past 3650 days.       Cath:      Stress Test:  Stress Results:  No results found for this or any previous visit from the past 365 days.         Cardiac Imaging:  Electrocardiogram 12 Lead  Ventricular-paced rhythm  Abnormal ECG  When compared with ECG of 25-JUL-2022 13:44,  Vent. rate has decreased BY   6 BPM  Confirmed by Randall Becker ([id number]) on 5/2/2023 9:38:21 AM        Assessment/Plan     This very pleasant 89-year-old female with medical problems partially outlined above remains on apixaban dose adjusted for body size and age.  We will do a surveillance CBC and basic metabolic panel and see her back in 6 months.  For now I think she should be considered acceptable anticoagulation risk      Orders:  Orders Placed This Encounter   Procedures    CBC    Basic metabolic panel      Followup Appts:  Future Appointments   Date Time Provider Department Center   12/13/2023  1:00 PM Estela Pelaez PA-C [id number] [facility]   1/22/2024 12:00 PM Pro Gomez MD [id number] [facility]           ____________________________________________________________  Randall Becker MD    Senior Attending Physician  [facility]  [facility]

## 2023-10-30 RX ORDER — ATORVASTATIN CALCIUM 40 MG/1
40 TABLET, FILM COATED ORAL DAILY
Qty: 90 TABLET | Refills: 3 | Status: SHIPPED | OUTPATIENT
Start: 2023-10-30 | End: 2024-01-22 | Stop reason: SDUPTHER

## 2023-10-30 NOTE — TELEPHONE ENCOUNTER
PT daughter called in for RF on RX: Atorvastatin 40 mg  [facility]

## 2023-11-13 ENCOUNTER — HOSPITAL ENCOUNTER (OUTPATIENT)
Dept: CARDIOLOGY | Facility: CLINIC | Age: 88
Discharge: HOME | End: 2023-11-13
Payer: MEDICARE

## 2023-11-13 DIAGNOSIS — I44.2 ATRIOVENTRICULAR BLOCK, COMPLETE (MULTI): ICD-10-CM

## 2023-11-13 DIAGNOSIS — Z95.0 PRESENCE OF CARDIAC PACEMAKER: ICD-10-CM

## 2023-11-13 PROCEDURE — 93296 REM INTERROG EVL PM/IDS: CPT

## 2023-11-13 PROCEDURE — 93294 REM INTERROG EVL PM/LDLS PM: CPT | Performed by: INTERNAL MEDICINE

## 2023-12-13 ENCOUNTER — OFFICE VISIT (OUTPATIENT)
Dept: ORTHOPEDIC SURGERY | Facility: HOSPITAL | Age: 88
End: 2023-12-13
Payer: MEDICARE

## 2023-12-13 ENCOUNTER — LAB (OUTPATIENT)
Dept: LAB | Facility: LAB | Age: 88
End: 2023-12-13
Payer: MEDICARE

## 2023-12-13 VITALS — WEIGHT: 105 LBS | HEIGHT: 58 IN | BODY MASS INDEX: 22.04 KG/M2

## 2023-12-13 DIAGNOSIS — M25.561 CHRONIC PAIN OF BOTH KNEES: Primary | ICD-10-CM

## 2023-12-13 DIAGNOSIS — G89.29 CHRONIC PAIN OF BOTH KNEES: Primary | ICD-10-CM

## 2023-12-13 DIAGNOSIS — I48.91 ATRIAL FIBRILLATION, UNSPECIFIED TYPE (MULTI): ICD-10-CM

## 2023-12-13 DIAGNOSIS — M25.562 CHRONIC PAIN OF BOTH KNEES: Primary | ICD-10-CM

## 2023-12-13 DIAGNOSIS — R53.83 FATIGUE, UNSPECIFIED TYPE: ICD-10-CM

## 2023-12-13 DIAGNOSIS — E78.9 LIPID DISORDER: ICD-10-CM

## 2023-12-13 LAB
ALBUMIN SERPL BCP-MCNC: 3.4 G/DL (ref 3.4–5)
ALP SERPL-CCNC: 75 U/L (ref 33–136)
ALT SERPL W P-5'-P-CCNC: 25 U/L (ref 7–45)
ANION GAP SERPL CALC-SCNC: 11 MMOL/L (ref 10–20)
AST SERPL W P-5'-P-CCNC: 17 U/L (ref 9–39)
BASOPHILS # BLD AUTO: 0.02 X10*3/UL (ref 0–0.1)
BASOPHILS NFR BLD AUTO: 0.4 %
BILIRUB SERPL-MCNC: 0.8 MG/DL (ref 0–1.2)
BUN SERPL-MCNC: 17 MG/DL (ref 6–23)
CALCIUM SERPL-MCNC: 8.9 MG/DL (ref 8.6–10.3)
CHLORIDE SERPL-SCNC: 107 MMOL/L (ref 98–107)
CHOLEST SERPL-MCNC: 160 MG/DL (ref 0–199)
CHOLESTEROL/HDL RATIO: 2.7
CO2 SERPL-SCNC: 29 MMOL/L (ref 21–32)
CREAT SERPL-MCNC: 0.79 MG/DL (ref 0.5–1.05)
EOSINOPHIL # BLD AUTO: 0.13 X10*3/UL (ref 0–0.4)
EOSINOPHIL NFR BLD AUTO: 2.6 %
ERYTHROCYTE [DISTWIDTH] IN BLOOD BY AUTOMATED COUNT: 12.9 % (ref 11.5–14.5)
GFR SERPL CREATININE-BSD FRML MDRD: 68 ML/MIN/1.73M*2
GLUCOSE SERPL-MCNC: 90 MG/DL (ref 74–99)
HCT VFR BLD AUTO: 41.7 % (ref 36–46)
HDLC SERPL-MCNC: 58.2 MG/DL
HGB BLD-MCNC: 13.8 G/DL (ref 12–16)
IMM GRANULOCYTES # BLD AUTO: 0.01 X10*3/UL (ref 0–0.5)
IMM GRANULOCYTES NFR BLD AUTO: 0.2 % (ref 0–0.9)
LDLC SERPL CALC-MCNC: 87 MG/DL
LYMPHOCYTES # BLD AUTO: 1.68 X10*3/UL (ref 0.8–3)
LYMPHOCYTES NFR BLD AUTO: 33.1 %
MCH RBC QN AUTO: 31.6 PG (ref 26–34)
MCHC RBC AUTO-ENTMCNC: 33.1 G/DL (ref 32–36)
MCV RBC AUTO: 95 FL (ref 80–100)
MONOCYTES # BLD AUTO: 0.48 X10*3/UL (ref 0.05–0.8)
MONOCYTES NFR BLD AUTO: 9.5 %
NEUTROPHILS # BLD AUTO: 2.75 X10*3/UL (ref 1.6–5.5)
NEUTROPHILS NFR BLD AUTO: 54.2 %
NON HDL CHOLESTEROL: 102 MG/DL (ref 0–149)
NRBC BLD-RTO: 0 /100 WBCS (ref 0–0)
PLATELET # BLD AUTO: 207 X10*3/UL (ref 150–450)
POTASSIUM SERPL-SCNC: 3.6 MMOL/L (ref 3.5–5.3)
PROT SERPL-MCNC: 6.4 G/DL (ref 6.4–8.2)
RBC # BLD AUTO: 4.37 X10*6/UL (ref 4–5.2)
SODIUM SERPL-SCNC: 143 MMOL/L (ref 136–145)
TRIGL SERPL-MCNC: 74 MG/DL (ref 0–149)
TSH SERPL-ACNC: 2.87 MIU/L (ref 0.44–3.98)
VLDL: 15 MG/DL (ref 0–40)
WBC # BLD AUTO: 5.1 X10*3/UL (ref 4.4–11.3)

## 2023-12-13 PROCEDURE — 1159F MED LIST DOCD IN RCRD: CPT | Performed by: PHYSICIAN ASSISTANT

## 2023-12-13 PROCEDURE — 1160F RVW MEDS BY RX/DR IN RCRD: CPT | Performed by: PHYSICIAN ASSISTANT

## 2023-12-13 PROCEDURE — 36415 COLL VENOUS BLD VENIPUNCTURE: CPT

## 2023-12-13 PROCEDURE — 80053 COMPREHEN METABOLIC PANEL: CPT

## 2023-12-13 PROCEDURE — 99212 OFFICE O/P EST SF 10 MIN: CPT | Performed by: PHYSICIAN ASSISTANT

## 2023-12-13 PROCEDURE — 2500000005 HC RX 250 GENERAL PHARMACY W/O HCPCS: Performed by: PHYSICIAN ASSISTANT

## 2023-12-13 PROCEDURE — 85025 COMPLETE CBC W/AUTO DIFF WBC: CPT

## 2023-12-13 PROCEDURE — 84443 ASSAY THYROID STIM HORMONE: CPT

## 2023-12-13 PROCEDURE — 1126F AMNT PAIN NOTED NONE PRSNT: CPT | Performed by: PHYSICIAN ASSISTANT

## 2023-12-13 PROCEDURE — 1036F TOBACCO NON-USER: CPT | Performed by: PHYSICIAN ASSISTANT

## 2023-12-13 PROCEDURE — 20610 DRAIN/INJ JOINT/BURSA W/O US: CPT | Performed by: PHYSICIAN ASSISTANT

## 2023-12-13 PROCEDURE — 80061 LIPID PANEL: CPT

## 2023-12-13 PROCEDURE — 2500000004 HC RX 250 GENERAL PHARMACY W/ HCPCS (ALT 636 FOR OP/ED): Performed by: PHYSICIAN ASSISTANT

## 2023-12-13 RX ORDER — LIDOCAINE HYDROCHLORIDE 10 MG/ML
4 INJECTION INFILTRATION; PERINEURAL
Status: COMPLETED | OUTPATIENT
Start: 2023-12-13 | End: 2023-12-13

## 2023-12-13 RX ORDER — TRIAMCINOLONE ACETONIDE 40 MG/ML
40 INJECTION, SUSPENSION INTRA-ARTICULAR; INTRAMUSCULAR
Status: COMPLETED | OUTPATIENT
Start: 2023-12-13 | End: 2023-12-13

## 2023-12-13 RX ADMIN — LIDOCAINE HYDROCHLORIDE 4 ML: 10 INJECTION, SOLUTION INFILTRATION; PERINEURAL at 13:14

## 2023-12-13 RX ADMIN — TRIAMCINOLONE ACETONIDE 40 MG: 400 INJECTION, SUSPENSION INTRA-ARTICULAR; INTRAMUSCULAR at 13:14

## 2023-12-13 ASSESSMENT — PAIN DESCRIPTION - DESCRIPTORS: DESCRIPTORS: THROBBING

## 2023-12-13 ASSESSMENT — PAIN - FUNCTIONAL ASSESSMENT: PAIN_FUNCTIONAL_ASSESSMENT: 0-10

## 2023-12-13 ASSESSMENT — PAIN SCALES - GENERAL: PAINLEVEL_OUTOF10: 7

## 2023-12-13 NOTE — PROGRESS NOTES
Marian returns to clinic today with her daughter for recurrent bilateral knee pain. She has a history of bilateral knee osteoarthritis.      Past medical history, medications, allergies, surgical history and review of systems have been reviewed with the patient. Pertinent changes are documented in the HPI. Otherwise they are unchanged when compared to last visit on 11/30, 2022.     Physical Examination Findings:  Constitutional: Appears well-developed and well-nourished.  Head: Normocephalic and atraumatic.  Eyes: Pupils are equal and round.  Cardiovascular: Intact distal pulses.   Respiratory: Effort normal. No respiratory distress.  Neurologic: Alert and oriented to person, place, and time.  Skin: Skin is warm and dry.  Hematologic / Lymphatic: No lymphedema, lymphangitis.  Psychiatric: normal mood and affect. Behavior is normal.   Musculoskeletal: Bilateral knees with small effusion. Tenderness over medial joint line. Ambulation with a walker.     Impression: Bilateral knee osteoarthritis     Plan: Repeat steroid injections were performed today and tolerated well. She will return to our office for recurrence or progression of symptoms.     Patient ID: Marian Fritz is a 89 y.o. female.    L Inj/Asp: bilateral knee on 12/13/2023 1:14 PM  Indications: pain  Details: 22 G needle, lateral approach  Medications (Right): 4 mL lidocaine 10 mg/mL (1 %); 40 mg triamcinolone acetonide 40 mg/mL  Medications (Left): 4 mL lidocaine 10 mg/mL (1 %); 40 mg triamcinolone acetonide 40 mg/mL  Procedure, treatment alternatives, risks and benefits explained, specific risks discussed. Consent was given by the patient. Immediately prior to procedure a time out was called to verify the correct patient, procedure, equipment, support staff and site/side marked as required.         Estela Pelaez PA-C  Department of Orthopaedic Surgery  [place]     Dictation performed with the use of voice recognition  software. Syntax and grammatical errors may exist

## 2023-12-14 ENCOUNTER — TELEPHONE (OUTPATIENT)
Dept: PRIMARY CARE | Facility: CLINIC | Age: 88
End: 2023-12-14
Payer: MEDICARE

## 2023-12-14 NOTE — TELEPHONE ENCOUNTER
----- Message from Pro Gomez MD sent at 12/13/2023  3:41 PM EST -----  Regarding: Labs  Please notify Ms Fritz or her family that her labs are excellent- please continue with current Rx  ----- Message -----  From: Lab, Background User  Sent: 12/13/2023   2:51 PM EST  To: Pro Gomez MD

## 2023-12-14 NOTE — TELEPHONE ENCOUNTER
Called patient and spoke with her daughter and informed her of [facility] message about her labs. She was in full understanding.       Fatimah Thomas MA

## 2024-01-22 ENCOUNTER — OFFICE VISIT (OUTPATIENT)
Dept: PRIMARY CARE | Facility: CLINIC | Age: 89
End: 2024-01-22
Payer: MEDICARE

## 2024-01-22 VITALS
HEART RATE: 82 BPM | BODY MASS INDEX: 21.74 KG/M2 | SYSTOLIC BLOOD PRESSURE: 110 MMHG | DIASTOLIC BLOOD PRESSURE: 78 MMHG | WEIGHT: 104 LBS | OXYGEN SATURATION: 98 % | RESPIRATION RATE: 20 BRPM

## 2024-01-22 DIAGNOSIS — R56.9 SEIZURE (MULTI): ICD-10-CM

## 2024-01-22 DIAGNOSIS — I50.33 ACUTE ON CHRONIC DIASTOLIC CONGESTIVE HEART FAILURE (MULTI): ICD-10-CM

## 2024-01-22 DIAGNOSIS — Z92.29 HX OF LONG TERM USE OF BLOOD THINNERS: ICD-10-CM

## 2024-01-22 DIAGNOSIS — K21.9 GASTROESOPHAGEAL REFLUX DISEASE WITHOUT ESOPHAGITIS: ICD-10-CM

## 2024-01-22 DIAGNOSIS — I10 BENIGN ESSENTIAL HTN: ICD-10-CM

## 2024-01-22 DIAGNOSIS — I48.91 ATRIAL FIBRILLATION, UNSPECIFIED TYPE (MULTI): Primary | ICD-10-CM

## 2024-01-22 DIAGNOSIS — E78.00 ELEVATED CHOLESTEROL: ICD-10-CM

## 2024-01-22 PROCEDURE — 1160F RVW MEDS BY RX/DR IN RCRD: CPT | Performed by: INTERNAL MEDICINE

## 2024-01-22 PROCEDURE — 3078F DIAST BP <80 MM HG: CPT | Performed by: INTERNAL MEDICINE

## 2024-01-22 PROCEDURE — 1159F MED LIST DOCD IN RCRD: CPT | Performed by: INTERNAL MEDICINE

## 2024-01-22 PROCEDURE — 1036F TOBACCO NON-USER: CPT | Performed by: INTERNAL MEDICINE

## 2024-01-22 PROCEDURE — 99214 OFFICE O/P EST MOD 30 MIN: CPT | Performed by: INTERNAL MEDICINE

## 2024-01-22 PROCEDURE — 1126F AMNT PAIN NOTED NONE PRSNT: CPT | Performed by: INTERNAL MEDICINE

## 2024-01-22 PROCEDURE — 3074F SYST BP LT 130 MM HG: CPT | Performed by: INTERNAL MEDICINE

## 2024-01-22 RX ORDER — LOSARTAN POTASSIUM 50 MG/1
50 TABLET ORAL DAILY
Qty: 90 TABLET | Refills: 3 | Status: ON HOLD | OUTPATIENT
Start: 2024-01-22 | End: 2025-01-21

## 2024-01-22 RX ORDER — LEVETIRACETAM 750 MG/1
750 TABLET ORAL 2 TIMES DAILY
Qty: 180 TABLET | Refills: 3 | Status: ON HOLD | OUTPATIENT
Start: 2024-01-22 | End: 2025-01-21

## 2024-01-22 RX ORDER — CARVEDILOL 6.25 MG/1
TABLET ORAL
Qty: 180 TABLET | Refills: 3 | Status: ON HOLD | OUTPATIENT
Start: 2024-01-22

## 2024-01-22 RX ORDER — ATORVASTATIN CALCIUM 40 MG/1
40 TABLET, FILM COATED ORAL DAILY
Qty: 90 TABLET | Refills: 3 | Status: ON HOLD | OUTPATIENT
Start: 2024-01-22 | End: 2025-01-21

## 2024-01-22 RX ORDER — PANTOPRAZOLE SODIUM 20 MG/1
20 TABLET, DELAYED RELEASE ORAL DAILY
Qty: 90 TABLET | Refills: 3 | Status: ON HOLD | OUTPATIENT
Start: 2024-01-22 | End: 2025-01-21

## 2024-01-22 RX ORDER — POTASSIUM CHLORIDE 750 MG/1
10 TABLET, FILM COATED, EXTENDED RELEASE ORAL EVERY OTHER DAY
Qty: 45 TABLET | Refills: 3 | Status: ON HOLD | OUTPATIENT
Start: 2024-01-22 | End: 2025-01-21

## 2024-01-22 NOTE — PROGRESS NOTES
Subjective   Patient ID: Marian Fritz is a 89 y.o. female who presents for Follow-up (3 month).    H/O Seizure    Atrial Fibrillation-Eliquis    HLD    H/o CHF    HPI     Review of Systems    [place]      No Fever/chills/headaches/dizziness/chest pains/ cough/ shortness of breath/palpitations/ abdominal pain /Nausea/vomiting/diarrhea/ constipation/urine frequency/blood in urine.      Objective   Pulse 82   Resp 20   Wt 47.2 kg (104 lb)   SpO2 98%   BMI 21.74 kg/m²     Physical Exam      No JVP elevation. No palpable Lymph Nodes. No Thyromegaly    HEENT- Negative    CVS-NL S1/S2 . No MRG    Lungs-CTA. B/S= B/L    Abdomen-Soft, Non-tender. No masses or HSM    Extremities: No C/C/E    Skin-No abnormal moles/rash        Assessment/Plan     H/O Seizure- Diagnosis  reviewed. well controlled. Continue with current Rx.     Atrial Fibrillation-Eliquis-  well controlled. Continue with current Rx.     HLD    H/o CHF- Diagnosis  reviewed. well controlled. Continue with current Rx.       Follow up in 6 months/PRN

## 2024-01-30 DIAGNOSIS — I48.91 ATRIAL FIBRILLATION, UNSPECIFIED TYPE (MULTI): ICD-10-CM

## 2024-01-30 RX ORDER — FUROSEMIDE 20 MG/1
10-20 TABLET ORAL DAILY
Qty: 45 TABLET | Refills: 2 | Status: ON HOLD | OUTPATIENT
Start: 2024-01-30 | End: 2024-04-29

## 2024-02-21 ENCOUNTER — TELEPHONE (OUTPATIENT)
Dept: PRIMARY CARE | Facility: CLINIC | Age: 89
End: 2024-02-21
Payer: MEDICARE

## 2024-02-22 DIAGNOSIS — I63.9 CEREBROVASCULAR ACCIDENT (CVA), UNSPECIFIED MECHANISM (MULTI): Primary | ICD-10-CM

## 2024-02-22 NOTE — TELEPHONE ENCOUNTER
Called patient and left a voice mail informing her that I have the disability placard from [facility] and to let me know if she would like to come pick it up or would she like for me to put it in the mail.    Fatimah Thomas MA

## 2024-03-06 ENCOUNTER — OFFICE VISIT (OUTPATIENT)
Dept: ORTHOPEDIC SURGERY | Facility: HOSPITAL | Age: 89
End: 2024-03-06
Payer: MEDICARE

## 2024-03-06 VITALS — WEIGHT: 105 LBS | BODY MASS INDEX: 22.04 KG/M2 | HEIGHT: 58 IN

## 2024-03-06 DIAGNOSIS — M17.0 OSTEOARTHRITIS OF BOTH KNEES, UNSPECIFIED OSTEOARTHRITIS TYPE: Primary | ICD-10-CM

## 2024-03-06 PROCEDURE — 1036F TOBACCO NON-USER: CPT | Performed by: PHYSICIAN ASSISTANT

## 2024-03-06 PROCEDURE — 99212 OFFICE O/P EST SF 10 MIN: CPT | Performed by: PHYSICIAN ASSISTANT

## 2024-03-06 PROCEDURE — 2500000005 HC RX 250 GENERAL PHARMACY W/O HCPCS: Performed by: PHYSICIAN ASSISTANT

## 2024-03-06 PROCEDURE — 20610 DRAIN/INJ JOINT/BURSA W/O US: CPT | Performed by: PHYSICIAN ASSISTANT

## 2024-03-06 PROCEDURE — 1126F AMNT PAIN NOTED NONE PRSNT: CPT | Performed by: PHYSICIAN ASSISTANT

## 2024-03-06 PROCEDURE — 2500000004 HC RX 250 GENERAL PHARMACY W/ HCPCS (ALT 636 FOR OP/ED): Performed by: PHYSICIAN ASSISTANT

## 2024-03-06 PROCEDURE — 1159F MED LIST DOCD IN RCRD: CPT | Performed by: PHYSICIAN ASSISTANT

## 2024-03-06 RX ORDER — TRIAMCINOLONE ACETONIDE 40 MG/ML
40 INJECTION, SUSPENSION INTRA-ARTICULAR; INTRAMUSCULAR
Status: COMPLETED | OUTPATIENT
Start: 2024-03-06 | End: 2024-03-06

## 2024-03-06 RX ORDER — LIDOCAINE HYDROCHLORIDE 10 MG/ML
4 INJECTION INFILTRATION; PERINEURAL
Status: COMPLETED | OUTPATIENT
Start: 2024-03-06 | End: 2024-03-06

## 2024-03-06 RX ADMIN — TRIAMCINOLONE ACETONIDE 40 MG: 400 INJECTION, SUSPENSION INTRA-ARTICULAR; INTRAMUSCULAR at 13:24

## 2024-03-06 RX ADMIN — LIDOCAINE HYDROCHLORIDE 4 ML: 10 INJECTION, SOLUTION INFILTRATION; PERINEURAL at 13:24

## 2024-03-06 ASSESSMENT — PAIN - FUNCTIONAL ASSESSMENT: PAIN_FUNCTIONAL_ASSESSMENT: 0-10

## 2024-03-06 ASSESSMENT — PAIN DESCRIPTION - DESCRIPTORS: DESCRIPTORS: ACHING

## 2024-03-06 ASSESSMENT — PAIN SCALES - GENERAL: PAINLEVEL_OUTOF10: 5 - MODERATE PAIN

## 2024-03-06 NOTE — PROGRESS NOTES
Marian returns to clinic today with her daughter for recurrent bilateral knee pain. She has a history of bilateral knee osteoarthritis.      Past medical history, medications, allergies, surgical history and review of systems have been reviewed with the patient. Pertinent changes are documented in the HPI. Otherwise they are unchanged when compared to last visit on 11/30, 2022.     Physical Examination Findings:  Constitutional: Appears well-developed and well-nourished.  Head: Normocephalic and atraumatic.  Eyes: Pupils are equal and round.  Cardiovascular: Intact distal pulses.   Respiratory: Effort normal. No respiratory distress.  Neurologic: Alert and oriented to person, place, and time.  Skin: Skin is warm and dry.  Hematologic / Lymphatic: No lymphedema, lymphangitis.  Psychiatric: normal mood and affect. Behavior is normal.   Musculoskeletal: Bilateral knees with small effusion. Tenderness over medial joint line. Ambulation with a walker.     Impression: Bilateral knee osteoarthritis     Plan: Repeat steroid injections were performed today and tolerated well. She will return to our office for recurrence or progression of symptoms.     Patient ID: Marian Fritz is a 89 y.o. female.    L Inj/Asp: bilateral knee on 3/6/2024 1:24 PM  Indications: pain  Details: 22 G needle, lateral approach  Medications (Right): 4 mL lidocaine 10 mg/mL (1 %); 40 mg triamcinolone acetonide 40 mg/mL  Medications (Left): 4 mL lidocaine 10 mg/mL (1 %); 40 mg triamcinolone acetonide 40 mg/mL  Procedure, treatment alternatives, risks and benefits explained, specific risks discussed. Consent was given by the patient. Immediately prior to procedure a time out was called to verify the correct patient, procedure, equipment, support staff and site/side marked as required.         Estela Pelaez PA-C  Department of Orthopaedic Surgery  [facility]     Dictation performed with the use of voice recognition  software. Syntax and grammatical errors may exist

## 2024-04-26 ENCOUNTER — OFFICE VISIT (OUTPATIENT)
Dept: CARDIOLOGY | Facility: CLINIC | Age: 89
End: 2024-04-26
Payer: MEDICARE

## 2024-04-26 VITALS
HEART RATE: 83 BPM | HEIGHT: 58 IN | OXYGEN SATURATION: 95 % | SYSTOLIC BLOOD PRESSURE: 145 MMHG | BODY MASS INDEX: 22.04 KG/M2 | WEIGHT: 105 LBS | DIASTOLIC BLOOD PRESSURE: 79 MMHG

## 2024-04-26 DIAGNOSIS — I10 BENIGN ESSENTIAL HTN: Primary | ICD-10-CM

## 2024-04-26 PROCEDURE — 93005 ELECTROCARDIOGRAM TRACING: CPT | Performed by: INTERNAL MEDICINE

## 2024-04-26 PROCEDURE — 99214 OFFICE O/P EST MOD 30 MIN: CPT | Performed by: INTERNAL MEDICINE

## 2024-04-26 ASSESSMENT — PAIN SCALES - GENERAL: PAINLEVEL: 0-NO PAIN

## 2024-04-26 ASSESSMENT — ENCOUNTER SYMPTOMS
LOSS OF SENSATION IN FEET: 1
OCCASIONAL FEELINGS OF UNSTEADINESS: 1
DEPRESSION: 0

## 2024-04-26 NOTE — PROGRESS NOTES
Primary Care Physician: Pro Gomez MD  Date of Visit: 04/26/2024  1:00 PM EDT  [place] [facility] [place] [address]     Chief Complaint:   No chief complaint on file.       HPI / Summary:   Marian Fritz is a 89 y.o. female presents for followup.     PAF, PPM, HF, on anticoagulation.  Sleeps a lot  Protein shakes.    Poor memory.  [place] [employer] [place], refuses to wear hearing aids.      Specialty Problems          Cardiology Problems    A-fib (Multi)    Acute on chronic diastolic congestive heart failure (Multi)    Atrioventricular block    Benign essential HTN    CHF (congestive heart failure) (Multi)    DVT (deep venous thrombosis) (Multi)    High degree atrioventricular block    Hyperlipidemia    Pacemaker    Tricuspid valve regurgitation        Past Medical History:   Diagnosis Date    Personal history of other diseases of the circulatory system     History of hypertension    Personal history of other diseases of the nervous system and sense organs 12/16/2013    History of complex partial epilepsy    Pure hypercholesterolemia, unspecified     High cholesterol    Unspecified cataract     Cataracts, both eyes    Unspecified convulsions (Multi)     Seizures          Past Surgical History:   Procedure Laterality Date    CATARACT EXTRACTION  12/01/2014    Cataract Surgery    CT ANGIO NECK  9/16/2018    CT NECK ANGIO W AND WO IV CONTRAST 9/16/2018 [facility] EMERGENCY LEGACY    CT HEAD ANGIO W AND WO IV CONTRAST  9/16/2018    CT HEAD ANGIO W AND WO IV CONTRAST 9/16/2018 [facility] EMERGENCY LEGACY    MR HEAD ANGIO WO IV CONTRAST  11/28/2018    MR HEAD ANGIO WO IV CONTRAST [place] [facility] [place]    MR NECK ANGIO WO IV CONTRAST  11/28/2018    MR NECK ANGIO WO IV CONTRAST 11/28/2018 [facility] [place]    OTHER SURGICAL HISTORY  12/01/2014    Simple Mastectomy Right Breast          Social History:   reports that she has never smoked. She has never used smokeless tobacco. She reports that she does not drink alcohol and does not use  drugs.      Allergies:  Allergies   Allergen Reactions    Amlodipine Other     Leg edema    Bee Venom Protein (Honey Bee) Other       Outpatient Medications:  Current Outpatient Medications   Medication Instructions    apixaban (ELIQUIS) 2.5 mg, oral, 2 times daily    atorvastatin (LIPITOR) 40 mg, oral, Daily    biotin 5 mg capsule oral    carvedilol (Coreg) 6.25 mg tablet take one tablet every morning and evening.    cholecalciferol (Vitamin D-3) 25 MCG (1000 UT) tablet 1 tablet, oral, Daily    furosemide (LASIX) 10-20 mg, oral, Daily    levETIRAcetam (KEPPRA) 750 mg, oral, 2 times daily    losartan (COZAAR) 50 mg, oral, Daily    pantoprazole (PROTONIX) 20 mg, oral, Daily    potassium chloride CR 10 mEq ER tablet 10 mEq, oral, Every other day    vitamins A,C,E-zinc-copper (PreserVision AREDS) 4,296 mcg-226 mg-90 mg capsule 1 capsule, oral, 2 times daily       ROS     Physical Exam:  There were no vitals filed for this visit.  Wt Readings from Last 5 Encounters:   03/06/24 47.6 kg (105 lb)   01/22/24 47.2 kg (104 lb)   12/13/23 47.6 kg (105 lb)   10/24/23 46 kg (101 lb 5 oz)   10/20/23 45.3 kg (99 lb 12.8 oz)     There is no height or weight on file to calculate BMI.     Well-developed female in no acute distress.  Neck veins did not appear distended.  She has a murmur systolic in timing 2/6 in severity radiating to the neck I did not appreciate carotid bruits.  Lungs were clear without crackles.  Abdomen was soft without easily palpable masses no dependent edema.  I thought her distal pulses were somewhat diminished  Last Labs:  CMP:  Recent Labs     12/13/23  1354 10/06/22  1439 06/16/22  0555 06/13/22  1145 06/12/22  0644    141 140 138 140   K 3.6 3.6 3.9 3.7 3.5    100 98 96* 100   CO2 29 32 32 33* 30   ANIONGAP 11 13 14 13 14   BUN 17 10 15 20 15   CREATININE 0.79 0.84 0.93 1.03 0.89   EGFR 68  --   --   --   --    GLUCOSE 90 102* 79 126* 99     Recent Labs     12/13/23  1354 10/06/22  0811  06/13/22  1145 06/08/22  1637 08/27/21  1032   ALBUMIN 3.4 4.0 3.3* 3.5 3.8   ALKPHOS 75 81 55 55 66   ALT 25 12 21 18 10   AST 17 13 20 17 14   BILITOT 0.8 0.9 0.7 1.0 0.7     CBC:  Recent Labs     12/13/23  1354 10/06/22  1439 06/16/22  0555 06/13/22  1145 06/12/22  0644   WBC 5.1 9.5 7.5 10.0 7.2   HGB 13.8 15.0 13.7 13.9 14.2   HCT 41.7 45.2 41.5 42.5 43.9    251 271 267 239   MCV 95 94 95 94 96     COAG:   Recent Labs     10/06/22  1615 07/01/19  1124 05/10/19  1449 11/27/18  0607 11/22/18  0545   INR 1.9* 1.3* 1.2* 1.2* 1.3*     HEME/ENDO:  Recent Labs     12/13/23  1354 06/09/22  0554 06/08/22  1637 08/27/21  1032 11/30/20  1327 10/04/19  1239 07/02/19  0555 07/01/19  1124 05/11/19  0616 12/17/18  0700 09/17/18  0620   IRONSAT  --   --   --   --   --   --   --   --   --  18*  --    TSH 2.87  --  1.71 2.17 2.43   < >  --    < >  --   --   --    HGBA1C  --  6.5*  --   --   --   --  5.4  --  5.8  --  5.7    < > = values in this interval not displayed.      CARDIAC:   Recent Labs     10/06/22  1615 10/06/22  1439 06/09/22  0554 06/08/22  1637 07/02/19  0555 07/01/19  1124 12/17/18  0700 12/10/18  0548 09/16/18  1332 01/16/18  1250   LDH  --   --   --   --   --   --   --   --   --  236   TROPHS 13 14* 47* 36*  --   --   --   --   --   --    BNP  --  218*  --   --  622* 577* 402* 442*   < >  --     < > = values in this interval not displayed.     Recent Labs     12/13/23  1354 06/09/22  0554 08/27/21  1032 11/30/20  1327   CHOL 160 152 157 194   LDLF  --  79 79 91   HDL 58.2 55.0 57.3 66.0   TRIG 74 89 103 183*       Last Cardiology Tests:  ECG:    AV paced rhythm  Echo:  Echo Results:  No results found for this or any previous visit from the past 3650 days.       Cath:      Stress Test:  Stress Results:  No results found for this or any previous visit from the past 365 days.         Cardiac Imaging:  Electrocardiogram 12 Lead  Ventricular-paced rhythm  Abnormal ECG  When compared with ECG of 25-JUL-2022  13:44,  Vent. rate has decreased BY   6 BPM  Confirmed by Randall Becker ([id number]) on 5/2/2023 9:38:21 AM        Assessment/Plan       89-year-old female with dementia, PAF, PPM, heart failure with preserved EF tolerating anticoagulation.  Labs reviewed from last December.  Clinically doing well, suspect some PAD based on diminished pedal pulses but no signs of claudication or critical limb ischemia.  I made her daughter aware if she has any changes in coloration of toe coloration or worsening leg pain she should seek more urgent medical attention.  I would recommend twice yearly blood counts and renal function assessments while on anticoagulation.  She is monitored remotely for her pacemaker.  Mobility seems to be a bit of an issue I have suggested decreasing the frequency of follow-up to once yearly she does follow regularly with her internist    Orders:  No orders of the defined types were placed in this encounter.     Followup Appts:  Future Appointments   Date Time Provider Department Center   6/10/2024  2:40 PM Pro Gomez MD [id number] [facility]   6/12/2024  1:00 PM Estela Pelaez PA-C [id number] [facility]           ____________________________________________________________  Randall Becker MD    Senior Attending Physician  [facility]  [facility]

## 2024-05-01 LAB
ATRIAL RATE: 71 BPM
Q ONSET: 193 MS
QRS COUNT: 12 BEATS
QRS DURATION: 156 MS
QT INTERVAL: 426 MS
QTC CALCULATION(BAZETT): 462 MS
QTC FREDERICIA: 450 MS
R AXIS: -84 DEGREES
T AXIS: 89 DEGREES
T OFFSET: 406 MS
VENTRICULAR RATE: 71 BPM

## 2024-06-05 ENCOUNTER — OFFICE VISIT (OUTPATIENT)
Dept: ORTHOPEDIC SURGERY | Facility: HOSPITAL | Age: 89
End: 2024-06-05
Payer: MEDICARE

## 2024-06-05 DIAGNOSIS — M17.0 OSTEOARTHRITIS OF BOTH KNEES, UNSPECIFIED OSTEOARTHRITIS TYPE: Primary | ICD-10-CM

## 2024-06-05 PROCEDURE — 20610 DRAIN/INJ JOINT/BURSA W/O US: CPT | Mod: 50 | Performed by: PHYSICIAN ASSISTANT

## 2024-06-05 PROCEDURE — 99212 OFFICE O/P EST SF 10 MIN: CPT | Performed by: PHYSICIAN ASSISTANT

## 2024-06-05 PROCEDURE — 2500000004 HC RX 250 GENERAL PHARMACY W/ HCPCS (ALT 636 FOR OP/ED): Performed by: PHYSICIAN ASSISTANT

## 2024-06-05 PROCEDURE — 2500000005 HC RX 250 GENERAL PHARMACY W/O HCPCS: Performed by: PHYSICIAN ASSISTANT

## 2024-06-05 PROCEDURE — 1036F TOBACCO NON-USER: CPT | Performed by: PHYSICIAN ASSISTANT

## 2024-06-05 PROCEDURE — 1126F AMNT PAIN NOTED NONE PRSNT: CPT | Performed by: PHYSICIAN ASSISTANT

## 2024-06-05 PROCEDURE — 1159F MED LIST DOCD IN RCRD: CPT | Performed by: PHYSICIAN ASSISTANT

## 2024-06-05 RX ORDER — LIDOCAINE HYDROCHLORIDE 10 MG/ML
4 INJECTION INFILTRATION; PERINEURAL
Status: COMPLETED | OUTPATIENT
Start: 2024-06-05 | End: 2024-06-05

## 2024-06-05 RX ORDER — TRIAMCINOLONE ACETONIDE 40 MG/ML
40 INJECTION, SUSPENSION INTRA-ARTICULAR; INTRAMUSCULAR
Status: COMPLETED | OUTPATIENT
Start: 2024-06-05 | End: 2024-06-05

## 2024-06-05 RX ADMIN — LIDOCAINE HYDROCHLORIDE 4 ML: 10 INJECTION, SOLUTION INFILTRATION; PERINEURAL at 13:33

## 2024-06-05 RX ADMIN — TRIAMCINOLONE ACETONIDE 40 MG: 400 INJECTION, SUSPENSION INTRA-ARTICULAR; INTRAMUSCULAR at 13:33

## 2024-06-05 ASSESSMENT — PAIN SCALES - GENERAL: PAINLEVEL: 0-NO PAIN

## 2024-06-05 ASSESSMENT — PAIN - FUNCTIONAL ASSESSMENT: PAIN_FUNCTIONAL_ASSESSMENT: NO/DENIES PAIN

## 2024-06-05 NOTE — PROGRESS NOTES
Marian returns to clinic today with her daughter for recurrent bilateral knee pain. She has a history of bilateral knee osteoarthritis.  At this time not having significant amount of pain     Past medical history, medications, allergies, surgical history and review of systems have been reviewed with the patient. Pertinent changes are documented in the HPI. Otherwise they are unchanged when compared to last visit     Physical Examination Findings:  Constitutional: Appears well-developed and well-nourished.  Head: Normocephalic and atraumatic.  Eyes: Pupils are equal and round.  Cardiovascular: Intact distal pulses.   Respiratory: Effort normal. No respiratory distress.  Neurologic: Alert and oriented to person, place, and time.  Skin: Skin is warm and dry.  Hematologic / Lymphatic: No lymphedema, lymphangitis.  Psychiatric: normal mood and affect. Behavior is normal.   Musculoskeletal: Bilateral knees with small effusion. Tenderness over medial joint line. Ambulation with a walker.     Impression: Bilateral knee osteoarthritis     Plan: Repeat steroid injections were performed today and tolerated well. She will return to our office for recurrence or progression of symptoms.     Patient ID: Marian Fritz is a 89 y.o. female.    L Inj/Asp: bilateral knee on 6/5/2024 1:33 PM  Indications: pain  Details: 22 G needle, lateral approach  Medications (Right): 4 mL lidocaine 10 mg/mL (1 %); 40 mg triamcinolone acetonide 40 mg/mL  Medications (Left): 4 mL lidocaine 10 mg/mL (1 %); 40 mg triamcinolone acetonide 40 mg/mL  Procedure, treatment alternatives, risks and benefits explained, specific risks discussed. Consent was given by the patient. Immediately prior to procedure a time out was called to verify the correct patient, procedure, equipment, support staff and site/side marked as required.         Estela Pelaez PA-C  Department of Orthopaedic Surgery  [facility]     Dictation performed  with the use of voice recognition software. Syntax and grammatical errors may exist

## 2024-06-09 ENCOUNTER — APPOINTMENT (OUTPATIENT)
Dept: RADIOLOGY | Facility: HOSPITAL | Age: 89
End: 2024-06-09
Payer: MEDICARE

## 2024-06-09 ENCOUNTER — APPOINTMENT (OUTPATIENT)
Dept: CARDIOLOGY | Facility: HOSPITAL | Age: 89
End: 2024-06-09
Payer: MEDICARE

## 2024-06-09 ENCOUNTER — HOSPITAL ENCOUNTER (INPATIENT)
Facility: HOSPITAL | Age: 89
LOS: 4 days | Discharge: ANOTHER HOSPITAL | End: 2024-06-13
Attending: EMERGENCY MEDICINE | Admitting: HOSPITALIST
Payer: MEDICARE

## 2024-06-09 DIAGNOSIS — I63.9 CEREBROVASCULAR ACCIDENT (CVA), UNSPECIFIED MECHANISM (MULTI): ICD-10-CM

## 2024-06-09 DIAGNOSIS — J18.9 COMMUNITY ACQUIRED PNEUMONIA, UNSPECIFIED LATERALITY: ICD-10-CM

## 2024-06-09 DIAGNOSIS — A41.9 SEPSIS, DUE TO UNSPECIFIED ORGANISM, UNSPECIFIED WHETHER ACUTE ORGAN DYSFUNCTION PRESENT (MULTI): ICD-10-CM

## 2024-06-09 DIAGNOSIS — R09.02 HYPOXIA: Primary | ICD-10-CM

## 2024-06-09 DIAGNOSIS — I63.442: ICD-10-CM

## 2024-06-09 LAB
ALBUMIN SERPL BCP-MCNC: 3.5 G/DL (ref 3.4–5)
ALP SERPL-CCNC: 66 U/L (ref 33–136)
ALT SERPL W P-5'-P-CCNC: 27 U/L (ref 7–45)
ANION GAP SERPL CALC-SCNC: 14 MMOL/L (ref 10–20)
APPEARANCE UR: CLEAR
AST SERPL W P-5'-P-CCNC: 17 U/L (ref 9–39)
BASOPHILS # BLD AUTO: 0.03 X10*3/UL (ref 0–0.1)
BASOPHILS NFR BLD AUTO: 0.2 %
BILIRUB SERPL-MCNC: 1 MG/DL (ref 0–1.2)
BILIRUB UR STRIP.AUTO-MCNC: NEGATIVE MG/DL
BUN SERPL-MCNC: 38 MG/DL (ref 6–23)
CALCIUM SERPL-MCNC: 8.5 MG/DL (ref 8.6–10.3)
CHLORIDE SERPL-SCNC: 104 MMOL/L (ref 98–107)
CK SERPL-CCNC: 142 U/L (ref 0–215)
CO2 SERPL-SCNC: 26 MMOL/L (ref 21–32)
COLOR UR: NORMAL
CREAT SERPL-MCNC: 0.88 MG/DL (ref 0.5–1.05)
EGFRCR SERPLBLD CKD-EPI 2021: 60 ML/MIN/1.73M*2
EOSINOPHIL # BLD AUTO: 0.04 X10*3/UL (ref 0–0.4)
EOSINOPHIL NFR BLD AUTO: 0.2 %
ERYTHROCYTE [DISTWIDTH] IN BLOOD BY AUTOMATED COUNT: 12.6 % (ref 11.5–14.5)
GLUCOSE SERPL-MCNC: 241 MG/DL (ref 74–99)
GLUCOSE UR STRIP.AUTO-MCNC: NORMAL MG/DL
HCT VFR BLD AUTO: 41.4 % (ref 36–46)
HGB BLD-MCNC: 14.1 G/DL (ref 12–16)
IMM GRANULOCYTES # BLD AUTO: 0.13 X10*3/UL (ref 0–0.5)
IMM GRANULOCYTES NFR BLD AUTO: 0.8 % (ref 0–0.9)
KETONES UR STRIP.AUTO-MCNC: NEGATIVE MG/DL
LEUKOCYTE ESTERASE UR QL STRIP.AUTO: NEGATIVE
LYMPHOCYTES # BLD AUTO: 0.89 X10*3/UL (ref 0.8–3)
LYMPHOCYTES NFR BLD AUTO: 5.2 %
MAGNESIUM SERPL-MCNC: 1.6 MG/DL (ref 1.6–2.4)
MCH RBC QN AUTO: 33 PG (ref 26–34)
MCHC RBC AUTO-ENTMCNC: 34.1 G/DL (ref 32–36)
MCV RBC AUTO: 97 FL (ref 80–100)
MONOCYTES # BLD AUTO: 1.54 X10*3/UL (ref 0.05–0.8)
MONOCYTES NFR BLD AUTO: 9 %
NEUTROPHILS # BLD AUTO: 14.49 X10*3/UL (ref 1.6–5.5)
NEUTROPHILS NFR BLD AUTO: 84.6 %
NITRITE UR QL STRIP.AUTO: NEGATIVE
NRBC BLD-RTO: 0 /100 WBCS (ref 0–0)
PH UR STRIP.AUTO: 5 [PH]
PLATELET # BLD AUTO: 218 X10*3/UL (ref 150–450)
POTASSIUM SERPL-SCNC: 3.6 MMOL/L (ref 3.5–5.3)
PROT SERPL-MCNC: 6.2 G/DL (ref 6.4–8.2)
PROT UR STRIP.AUTO-MCNC: NEGATIVE MG/DL
RBC # BLD AUTO: 4.27 X10*6/UL (ref 4–5.2)
RBC # UR STRIP.AUTO: NEGATIVE /UL
SODIUM SERPL-SCNC: 140 MMOL/L (ref 136–145)
SP GR UR STRIP.AUTO: 1.01
UROBILINOGEN UR STRIP.AUTO-MCNC: NORMAL MG/DL
WBC # BLD AUTO: 17.1 X10*3/UL (ref 4.4–11.3)

## 2024-06-09 PROCEDURE — 36415 COLL VENOUS BLD VENIPUNCTURE: CPT | Performed by: EMERGENCY MEDICINE

## 2024-06-09 PROCEDURE — 96375 TX/PRO/DX INJ NEW DRUG ADDON: CPT

## 2024-06-09 PROCEDURE — 2060000001 HC INTERMEDIATE ICU ROOM DAILY

## 2024-06-09 PROCEDURE — 85025 COMPLETE CBC W/AUTO DIFF WBC: CPT | Performed by: EMERGENCY MEDICINE

## 2024-06-09 PROCEDURE — 82947 ASSAY GLUCOSE BLOOD QUANT: CPT | Performed by: EMERGENCY MEDICINE

## 2024-06-09 PROCEDURE — 2500000004 HC RX 250 GENERAL PHARMACY W/ HCPCS (ALT 636 FOR OP/ED): Performed by: EMERGENCY MEDICINE

## 2024-06-09 PROCEDURE — 73030 X-RAY EXAM OF SHOULDER: CPT | Mod: RIGHT SIDE | Performed by: RADIOLOGY

## 2024-06-09 PROCEDURE — 96361 HYDRATE IV INFUSION ADD-ON: CPT

## 2024-06-09 PROCEDURE — 71045 X-RAY EXAM CHEST 1 VIEW: CPT | Mod: FOREIGN READ | Performed by: RADIOLOGY

## 2024-06-09 PROCEDURE — 2500000001 HC RX 250 WO HCPCS SELF ADMINISTERED DRUGS (ALT 637 FOR MEDICARE OP): Performed by: HOSPITALIST

## 2024-06-09 PROCEDURE — 96374 THER/PROPH/DIAG INJ IV PUSH: CPT

## 2024-06-09 PROCEDURE — 99223 1ST HOSP IP/OBS HIGH 75: CPT | Performed by: HOSPITALIST

## 2024-06-09 PROCEDURE — 82550 ASSAY OF CK (CPK): CPT | Performed by: EMERGENCY MEDICINE

## 2024-06-09 PROCEDURE — 72125 CT NECK SPINE W/O DYE: CPT

## 2024-06-09 PROCEDURE — 2500000001 HC RX 250 WO HCPCS SELF ADMINISTERED DRUGS (ALT 637 FOR MEDICARE OP): Performed by: EMERGENCY MEDICINE

## 2024-06-09 PROCEDURE — 87040 BLOOD CULTURE FOR BACTERIA: CPT | Mod: 91,AHULAB | Performed by: EMERGENCY MEDICINE

## 2024-06-09 PROCEDURE — 72125 CT NECK SPINE W/O DYE: CPT | Performed by: RADIOLOGY

## 2024-06-09 PROCEDURE — 73030 X-RAY EXAM OF SHOULDER: CPT | Mod: RT

## 2024-06-09 PROCEDURE — 93005 ELECTROCARDIOGRAM TRACING: CPT

## 2024-06-09 PROCEDURE — 73030 X-RAY EXAM OF SHOULDER: CPT | Mod: LT

## 2024-06-09 PROCEDURE — 73030 X-RAY EXAM OF SHOULDER: CPT | Mod: LEFT SIDE | Performed by: RADIOLOGY

## 2024-06-09 PROCEDURE — 83735 ASSAY OF MAGNESIUM: CPT | Performed by: HOSPITALIST

## 2024-06-09 PROCEDURE — 72170 X-RAY EXAM OF PELVIS: CPT | Mod: FOREIGN READ | Performed by: RADIOLOGY

## 2024-06-09 PROCEDURE — 71045 X-RAY EXAM CHEST 1 VIEW: CPT

## 2024-06-09 PROCEDURE — 99285 EMERGENCY DEPT VISIT HI MDM: CPT | Mod: 25

## 2024-06-09 PROCEDURE — 70450 CT HEAD/BRAIN W/O DYE: CPT

## 2024-06-09 PROCEDURE — 70450 CT HEAD/BRAIN W/O DYE: CPT | Performed by: RADIOLOGY

## 2024-06-09 PROCEDURE — 81003 URINALYSIS AUTO W/O SCOPE: CPT | Performed by: EMERGENCY MEDICINE

## 2024-06-09 PROCEDURE — 72170 X-RAY EXAM OF PELVIS: CPT

## 2024-06-09 RX ORDER — ACETAMINOPHEN 325 MG/1
650 TABLET ORAL EVERY 4 HOURS PRN
Status: DISCONTINUED | OUTPATIENT
Start: 2024-06-09 | End: 2024-06-13 | Stop reason: HOSPADM

## 2024-06-09 RX ORDER — ONDANSETRON HYDROCHLORIDE 2 MG/ML
4 INJECTION, SOLUTION INTRAVENOUS ONCE
Status: COMPLETED | OUTPATIENT
Start: 2024-06-09 | End: 2024-06-09

## 2024-06-09 RX ORDER — AZITHROMYCIN 500 MG/1
500 TABLET, FILM COATED ORAL ONCE
Status: COMPLETED | OUTPATIENT
Start: 2024-06-09 | End: 2024-06-09

## 2024-06-09 RX ORDER — AMLODIPINE BESYLATE 5 MG/1
5 TABLET ORAL DAILY
COMMUNITY

## 2024-06-09 RX ORDER — ELECTROLYTES/DEXTROSE
5 SOLUTION, ORAL ORAL DAILY
Status: DISCONTINUED | OUTPATIENT
Start: 2024-06-10 | End: 2024-06-09 | Stop reason: CLARIF

## 2024-06-09 RX ORDER — PANTOPRAZOLE SODIUM 20 MG/1
20 TABLET, DELAYED RELEASE ORAL
Status: DISCONTINUED | OUTPATIENT
Start: 2024-06-10 | End: 2024-06-09

## 2024-06-09 RX ORDER — ONDANSETRON HYDROCHLORIDE 2 MG/ML
4 INJECTION, SOLUTION INTRAVENOUS EVERY 8 HOURS PRN
Status: DISCONTINUED | OUTPATIENT
Start: 2024-06-09 | End: 2024-06-13 | Stop reason: HOSPADM

## 2024-06-09 RX ORDER — TALC
3 POWDER (GRAM) TOPICAL NIGHTLY
Status: DISCONTINUED | OUTPATIENT
Start: 2024-06-09 | End: 2024-06-13 | Stop reason: HOSPADM

## 2024-06-09 RX ORDER — AMLODIPINE BESYLATE 5 MG/1
5 TABLET ORAL DAILY
Status: DISCONTINUED | OUTPATIENT
Start: 2024-06-10 | End: 2024-06-10 | Stop reason: WASHOUT

## 2024-06-09 RX ORDER — PANTOPRAZOLE SODIUM 40 MG/10ML
40 INJECTION, POWDER, LYOPHILIZED, FOR SOLUTION INTRAVENOUS
Status: DISCONTINUED | OUTPATIENT
Start: 2024-06-10 | End: 2024-06-13 | Stop reason: HOSPADM

## 2024-06-09 RX ORDER — ATORVASTATIN CALCIUM 40 MG/1
40 TABLET, FILM COATED ORAL DAILY
Status: DISCONTINUED | OUTPATIENT
Start: 2024-06-10 | End: 2024-06-13 | Stop reason: HOSPADM

## 2024-06-09 RX ORDER — CARVEDILOL 6.25 MG/1
6.25 TABLET ORAL
Status: DISCONTINUED | OUTPATIENT
Start: 2024-06-10 | End: 2024-06-13 | Stop reason: HOSPADM

## 2024-06-09 RX ORDER — ONDANSETRON 4 MG/1
4 TABLET, ORALLY DISINTEGRATING ORAL EVERY 8 HOURS PRN
Status: DISCONTINUED | OUTPATIENT
Start: 2024-06-09 | End: 2024-06-13 | Stop reason: HOSPADM

## 2024-06-09 RX ORDER — MORPHINE SULFATE 2 MG/ML
2 INJECTION, SOLUTION INTRAMUSCULAR; INTRAVENOUS ONCE
Status: COMPLETED | OUTPATIENT
Start: 2024-06-09 | End: 2024-06-09

## 2024-06-09 RX ORDER — PANTOPRAZOLE SODIUM 40 MG/1
40 TABLET, DELAYED RELEASE ORAL
Status: DISCONTINUED | OUTPATIENT
Start: 2024-06-10 | End: 2024-06-13 | Stop reason: HOSPADM

## 2024-06-09 RX ORDER — LEVETIRACETAM 500 MG/1
750 TABLET ORAL 2 TIMES DAILY
Status: DISCONTINUED | OUTPATIENT
Start: 2024-06-09 | End: 2024-06-13 | Stop reason: HOSPADM

## 2024-06-09 RX ORDER — CEFTRIAXONE 1 G/50ML
1 INJECTION, SOLUTION INTRAVENOUS EVERY 24 HOURS
Status: DISCONTINUED | OUTPATIENT
Start: 2024-06-10 | End: 2024-06-11

## 2024-06-09 RX ADMIN — MORPHINE SULFATE 2 MG: 2 INJECTION, SOLUTION INTRAMUSCULAR; INTRAVENOUS at 17:19

## 2024-06-09 RX ADMIN — APIXABAN 2.5 MG: 2.5 TABLET, FILM COATED ORAL at 23:40

## 2024-06-09 RX ADMIN — AZITHROMYCIN DIHYDRATE 500 MG: 500 TABLET ORAL at 20:53

## 2024-06-09 RX ADMIN — Medication 3 MG: at 23:39

## 2024-06-09 RX ADMIN — LEVETIRACETAM 750 MG: 500 TABLET, FILM COATED ORAL at 23:39

## 2024-06-09 RX ADMIN — CEFTRIAXONE 2 G: 2 INJECTION, POWDER, FOR SOLUTION INTRAMUSCULAR; INTRAVENOUS at 20:53

## 2024-06-09 RX ADMIN — SODIUM CHLORIDE 500 ML: 9 INJECTION, SOLUTION INTRAVENOUS at 17:21

## 2024-06-09 RX ADMIN — ONDANSETRON 4 MG: 2 INJECTION INTRAMUSCULAR; INTRAVENOUS at 17:19

## 2024-06-09 RX ADMIN — ACETAMINOPHEN 650 MG: 325 TABLET ORAL at 23:39

## 2024-06-09 ASSESSMENT — PAIN - FUNCTIONAL ASSESSMENT
PAIN_FUNCTIONAL_ASSESSMENT: 0-10
PAIN_FUNCTIONAL_ASSESSMENT: UNABLE TO SELF-REPORT

## 2024-06-09 ASSESSMENT — PAIN SCALES - GENERAL: PAINLEVEL_OUTOF10: 3

## 2024-06-09 ASSESSMENT — PAIN DESCRIPTION - LOCATION: LOCATION: ARM

## 2024-06-09 ASSESSMENT — PAIN DESCRIPTION - ORIENTATION: ORIENTATION: LEFT

## 2024-06-09 NOTE — ED PROVIDER NOTES
HPI   Chief Complaint   Patient presents with    Fall       Chief complaint: Fall    History of present illness: Patient is a 89-year-old female presenting to the emergency department with complaints of a fall.  According to the patient's family who provide the patient's history, the patient was found on the ground.  The patient takes Eliquis.  The patient was found on the ground next to her bed and it is unsure how long the patient was on the ground or how she got there.  The patient seems to be avoiding use of her left arm and refuses to move it.  Patient has a history of dementia and as result the patient is unable provide any further history.  Given this, the patient was brought to the emergency department for further evaluation.        History provided by:  Patient   used: No                        No data recorded                   Patient History   Past Medical History:   Diagnosis Date    Personal history of other diseases of the circulatory system     History of hypertension    Personal history of other diseases of the nervous system and sense organs 12/16/2013    History of complex partial epilepsy    Pure hypercholesterolemia, unspecified     High cholesterol    Unspecified cataract     Cataracts, both eyes    Unspecified convulsions (Multi)     Seizures     Past Surgical History:   Procedure Laterality Date    CATARACT EXTRACTION  12/01/2014    Cataract Surgery    CT ANGIO NECK  9/16/2018    CT NECK ANGIO W AND WO IV CONTRAST 9/16/2018 [facility] EMERGENCY LEGACY    CT HEAD ANGIO W AND WO IV CONTRAST  9/16/2018    CT HEAD ANGIO W AND WO IV CONTRAST 9/16/2018 [facility] EMERGENCY LEGACY    MR HEAD ANGIO WO IV CONTRAST  11/28/2018    MR HEAD ANGIO WO IV CONTRAST [place] [facility] [place]    MR NECK ANGIO WO IV CONTRAST  11/28/2018    MR NECK ANGIO WO IV CONTRAST [place] [facility] [place]    OTHER SURGICAL HISTORY  12/01/2014    Simple Mastectomy Right Breast     Family History   Problem  Relation Name Age of Onset    Heart failure Mother      Tracheoesophageal fistual Father      Pancreatic cancer Sister      Coronary artery disease Brother      Pancreatic cancer Brother       Social History     Tobacco Use    Smoking status: Never    Smokeless tobacco: Never   Vaping Use    Vaping status: Never Used   Substance Use Topics    Alcohol use: Never    Drug use: Never       Physical Exam   ED Triage Vitals [06/09/24 1536]   Temperature Heart Rate Respirations BP   36.8 °C (98.2 °F) 66 18 129/59      Pulse Ox Temp Source Heart Rate Source Patient Position   99 % Tympanic Monitor Sitting      BP Location FiO2 (%)     Right arm --       Physical Exam  Constitutional:       Appearance: Normal appearance.   HENT:      Head: Normocephalic and atraumatic.      Right Ear: External ear normal.      Left Ear: External ear normal.      Nose: Nose normal.      Mouth/Throat:      Mouth: Mucous membranes are moist.   Eyes:      General: Lids are normal.      Extraocular Movements: Extraocular movements intact.      Pupils: Pupils are equal, round, and reactive to light.   Cardiovascular:      Rate and Rhythm: Normal rate and regular rhythm.      Heart sounds: Normal heart sounds.   Pulmonary:      Effort: Pulmonary effort is normal.      Breath sounds: Normal breath sounds.   Abdominal:      General: Abdomen is flat.      Palpations: Abdomen is soft.      Tenderness: There is no abdominal tenderness. There is no guarding or rebound.   Musculoskeletal:         General: No deformity. Normal range of motion.      Cervical back: Normal range of motion and neck supple.   Skin:     General: Skin is warm.      Capillary Refill: Capillary refill takes less than 2 seconds.      Coloration: Skin is not jaundiced.   Neurological:      General: No focal deficit present.      Mental Status: She is alert. Mental status is at baseline.   Psychiatric:         Mood and Affect: Mood normal.         Behavior: Behavior normal.         ED  Course & MDM   Diagnoses as of 06/23/24 0048   Hypoxia   Community acquired pneumonia, unspecified laterality   Sepsis, due to unspecified organism, unspecified whether acute organ dysfunction present (Multi)       Medical Decision Making  Medical decision making: Patient remained stable throughout her time in the emergency department.  CBC demonstrated a white cell count of 17 but no other significant acute abnormalities the patient's Chem-7 and LFTs were both within normal limits.  CAT scan the patient's head demonstrated no significant intracranial abnormality only generalized volume loss CAT scan the patient cervical spine demonstrated no posttraumatic abnormalities x-ray of the patient's chest demonstrated what is subtle patchy patchy airspace opacities consistent with pneumonia.  X-ray of the pelvis demonstrated no significant acute abnormalities while x-ray of the patient's bilateral shoulders demonstrated no significant acute abnormalities.  The patient's EKG demonstrated a flutter with a Paced ventricle with a rate of 61 bpm and associated left bundle branch block with no evidence of ischemia based on Sgarbossa's criteria and a QTc of 448.    Patient presents to the emergency department after a fall.  Workup was performed as above and demonstrated a likely multifocal pneumonia.  Given this finding along with patient's leukocytosis and borderline hypoxia in the emergency department I do have concerns the patient is presenting with a multifocal community-acquired pneumonia patient was given morphine for pain control patient was given gentle hydration patient was given azithromycin and Rocephin and Zofran.  At this time, the patient will require admission to the hospital given her advanced age and relative hypoxia.  I spoke with the hospitalist who agreed the patient could be admitted to their service for further evaluation and therapy.  The patient was then admitted to the hospital in otherwise stable  condition.    Amount and/or Complexity of Data Reviewed  Labs: ordered. Decision-making details documented in ED Course.  Radiology: ordered. Decision-making details documented in ED Course.  ECG/medicine tests: ordered and independent interpretation performed. Decision-making details documented in ED Course.        Procedure  Procedures     Curly Romero MD  06/23/24 0058

## 2024-06-09 NOTE — ED TRIAGE NOTES
Pt was brought in by family. Pt's daughter stated that they found the pt laying between the bed and the night stand on the floor. Family stated that they do don't know what happened or how long the pt was on the floor. Pt is favoring her left arm and refuses to move her left arm. Pt is a dementia pt and unable to answer question

## 2024-06-09 NOTE — ED NOTES
Received report and assumed care of pt. Pt presented to the ED due to an unwitnessed fall at home. Pt's daughter found pt on the floor between the night stand and her bed and isn't sure how long she was down. Pt is on blood thinners. Unable to assess if pt LOC or hitting her head due to pt having dementia and unable to remember what happened. Pt has redness on bilateral shoulders, limited movement to the left arm, and small redness on her right forearm. Pt also has bilateral bruising and swelling of the knees and daughter isn't  sure if it was due to her recent cortisone injections or due to the fall. Pt is a&ox2, was placed in a c-collar at triage, and is laying in bed with daughters at the bedside. MD adams the bedside.      Lamonte Powers RN  06/09/24 3356

## 2024-06-10 ENCOUNTER — TELEPHONE (OUTPATIENT)
Dept: PRIMARY CARE | Facility: CLINIC | Age: 89
End: 2024-06-10

## 2024-06-10 ENCOUNTER — APPOINTMENT (OUTPATIENT)
Dept: RADIOLOGY | Facility: HOSPITAL | Age: 89
End: 2024-06-10
Payer: MEDICARE

## 2024-06-10 LAB
ALBUMIN SERPL BCP-MCNC: 2.9 G/DL (ref 3.4–5)
ALP SERPL-CCNC: 56 U/L (ref 33–136)
ALT SERPL W P-5'-P-CCNC: 23 U/L (ref 7–45)
ANION GAP SERPL CALC-SCNC: 12 MMOL/L (ref 10–20)
AST SERPL W P-5'-P-CCNC: 14 U/L (ref 9–39)
BASOPHILS # BLD AUTO: 0.02 X10*3/UL (ref 0–0.1)
BASOPHILS NFR BLD AUTO: 0.2 %
BILIRUB SERPL-MCNC: 0.8 MG/DL (ref 0–1.2)
BNP SERPL-MCNC: 323 PG/ML (ref 0–99)
BUN SERPL-MCNC: 32 MG/DL (ref 6–23)
CALCIUM SERPL-MCNC: 7.9 MG/DL (ref 8.6–10.3)
CHLORIDE SERPL-SCNC: 105 MMOL/L (ref 98–107)
CO2 SERPL-SCNC: 27 MMOL/L (ref 21–32)
CREAT SERPL-MCNC: 0.89 MG/DL (ref 0.5–1.05)
EGFRCR SERPLBLD CKD-EPI 2021: 59 ML/MIN/1.73M*2
EOSINOPHIL # BLD AUTO: 0.31 X10*3/UL (ref 0–0.4)
EOSINOPHIL NFR BLD AUTO: 2.7 %
ERYTHROCYTE [DISTWIDTH] IN BLOOD BY AUTOMATED COUNT: 12.5 % (ref 11.5–14.5)
EST. AVERAGE GLUCOSE BLD GHB EST-MCNC: 123 MG/DL
GLUCOSE BLD MANUAL STRIP-MCNC: 203 MG/DL (ref 74–99)
GLUCOSE SERPL-MCNC: 106 MG/DL (ref 74–99)
HBA1C MFR BLD: 5.9 %
HCT VFR BLD AUTO: 37.9 % (ref 36–46)
HGB BLD-MCNC: 12.9 G/DL (ref 12–16)
HOLD SPECIMEN: NORMAL
IMM GRANULOCYTES # BLD AUTO: 0.11 X10*3/UL (ref 0–0.5)
IMM GRANULOCYTES NFR BLD AUTO: 1 % (ref 0–0.9)
LYMPHOCYTES # BLD AUTO: 1.37 X10*3/UL (ref 0.8–3)
LYMPHOCYTES NFR BLD AUTO: 12 %
MCH RBC QN AUTO: 32.7 PG (ref 26–34)
MCHC RBC AUTO-ENTMCNC: 34 G/DL (ref 32–36)
MCV RBC AUTO: 96 FL (ref 80–100)
MONOCYTES # BLD AUTO: 0.85 X10*3/UL (ref 0.05–0.8)
MONOCYTES NFR BLD AUTO: 7.5 %
NEUTROPHILS # BLD AUTO: 8.73 X10*3/UL (ref 1.6–5.5)
NEUTROPHILS NFR BLD AUTO: 76.6 %
NRBC BLD-RTO: 0 /100 WBCS (ref 0–0)
PLATELET # BLD AUTO: 188 X10*3/UL (ref 150–450)
POTASSIUM SERPL-SCNC: 3.7 MMOL/L (ref 3.5–5.3)
PROCALCITONIN SERPL-MCNC: 0.14 NG/ML
PROT SERPL-MCNC: 5.5 G/DL (ref 6.4–8.2)
RBC # BLD AUTO: 3.94 X10*6/UL (ref 4–5.2)
SODIUM SERPL-SCNC: 140 MMOL/L (ref 136–145)
WBC # BLD AUTO: 11.4 X10*3/UL (ref 4.4–11.3)

## 2024-06-10 PROCEDURE — 99223 1ST HOSP IP/OBS HIGH 75: CPT | Performed by: PSYCHIATRY & NEUROLOGY

## 2024-06-10 PROCEDURE — 84145 PROCALCITONIN (PCT): CPT | Mod: AHULAB | Performed by: NURSE PRACTITIONER

## 2024-06-10 PROCEDURE — 85025 COMPLETE CBC W/AUTO DIFF WBC: CPT | Performed by: HOSPITALIST

## 2024-06-10 PROCEDURE — 36415 COLL VENOUS BLD VENIPUNCTURE: CPT | Performed by: NURSE PRACTITIONER

## 2024-06-10 PROCEDURE — 82947 ASSAY GLUCOSE BLOOD QUANT: CPT

## 2024-06-10 PROCEDURE — 36415 COLL VENOUS BLD VENIPUNCTURE: CPT | Performed by: HOSPITALIST

## 2024-06-10 PROCEDURE — 73200 CT UPPER EXTREMITY W/O DYE: CPT | Mod: LT

## 2024-06-10 PROCEDURE — 2500000001 HC RX 250 WO HCPCS SELF ADMINISTERED DRUGS (ALT 637 FOR MEDICARE OP): Performed by: HOSPITALIST

## 2024-06-10 PROCEDURE — 99222 1ST HOSP IP/OBS MODERATE 55: CPT | Performed by: NURSE PRACTITIONER

## 2024-06-10 PROCEDURE — 83036 HEMOGLOBIN GLYCOSYLATED A1C: CPT | Mod: AHULAB | Performed by: NURSE PRACTITIONER

## 2024-06-10 PROCEDURE — 76376 3D RENDER W/INTRP POSTPROCES: CPT | Mod: LEFT SIDE | Performed by: STUDENT IN AN ORGANIZED HEALTH CARE EDUCATION/TRAINING PROGRAM

## 2024-06-10 PROCEDURE — 87449 NOS EACH ORGANISM AG IA: CPT | Mod: AHULAB | Performed by: HOSPITALIST

## 2024-06-10 PROCEDURE — 73200 CT UPPER EXTREMITY W/O DYE: CPT | Mod: LEFT SIDE | Performed by: STUDENT IN AN ORGANIZED HEALTH CARE EDUCATION/TRAINING PROGRAM

## 2024-06-10 PROCEDURE — 80053 COMPREHEN METABOLIC PANEL: CPT | Performed by: HOSPITALIST

## 2024-06-10 PROCEDURE — 2500000004 HC RX 250 GENERAL PHARMACY W/ HCPCS (ALT 636 FOR OP/ED): Performed by: HOSPITALIST

## 2024-06-10 PROCEDURE — 83880 ASSAY OF NATRIURETIC PEPTIDE: CPT | Performed by: NURSE PRACTITIONER

## 2024-06-10 PROCEDURE — 87899 AGENT NOS ASSAY W/OPTIC: CPT | Mod: AHULAB | Performed by: HOSPITALIST

## 2024-06-10 PROCEDURE — 97161 PT EVAL LOW COMPLEX 20 MIN: CPT | Mod: GP

## 2024-06-10 PROCEDURE — 97530 THERAPEUTIC ACTIVITIES: CPT | Mod: GP

## 2024-06-10 PROCEDURE — 99232 SBSQ HOSP IP/OBS MODERATE 35: CPT | Performed by: INTERNAL MEDICINE

## 2024-06-10 PROCEDURE — 2060000001 HC INTERMEDIATE ICU ROOM DAILY

## 2024-06-10 RX ORDER — LOSARTAN POTASSIUM 50 MG/1
50 TABLET ORAL DAILY
Status: DISCONTINUED | OUTPATIENT
Start: 2024-06-10 | End: 2024-06-13 | Stop reason: HOSPADM

## 2024-06-10 RX ORDER — HYDRALAZINE HYDROCHLORIDE 20 MG/ML
10 INJECTION INTRAMUSCULAR; INTRAVENOUS
Status: ACTIVE | OUTPATIENT
Start: 2024-06-10 | End: 2024-06-12

## 2024-06-10 RX ORDER — HYDRALAZINE HYDROCHLORIDE 25 MG/1
25 TABLET, FILM COATED ORAL EVERY 6 HOURS PRN
Status: DISCONTINUED | OUTPATIENT
Start: 2024-06-12 | End: 2024-06-13 | Stop reason: HOSPADM

## 2024-06-10 RX ADMIN — LEVETIRACETAM 750 MG: 500 TABLET, FILM COATED ORAL at 21:43

## 2024-06-10 RX ADMIN — ACETAMINOPHEN 650 MG: 325 TABLET ORAL at 23:52

## 2024-06-10 RX ADMIN — APIXABAN 2.5 MG: 2.5 TABLET, FILM COATED ORAL at 09:53

## 2024-06-10 RX ADMIN — CARVEDILOL 6.25 MG: 6.25 TABLET, FILM COATED ORAL at 09:53

## 2024-06-10 RX ADMIN — AZITHROMYCIN MONOHYDRATE 500 MG: 500 INJECTION, POWDER, LYOPHILIZED, FOR SOLUTION INTRAVENOUS at 22:26

## 2024-06-10 RX ADMIN — APIXABAN 2.5 MG: 2.5 TABLET, FILM COATED ORAL at 21:43

## 2024-06-10 RX ADMIN — ATORVASTATIN CALCIUM 40 MG: 40 TABLET, FILM COATED ORAL at 09:53

## 2024-06-10 RX ADMIN — CEFTRIAXONE SODIUM 1 G: 1 INJECTION, SOLUTION INTRAVENOUS at 21:43

## 2024-06-10 RX ADMIN — PANTOPRAZOLE SODIUM 40 MG: 40 TABLET, DELAYED RELEASE ORAL at 10:06

## 2024-06-10 RX ADMIN — CARVEDILOL 6.25 MG: 6.25 TABLET, FILM COATED ORAL at 17:06

## 2024-06-10 RX ADMIN — Medication 3 MG: at 21:43

## 2024-06-10 RX ADMIN — LEVETIRACETAM 750 MG: 500 TABLET, FILM COATED ORAL at 09:58

## 2024-06-10 SDOH — SOCIAL STABILITY: SOCIAL INSECURITY: WERE YOU ABLE TO COMPLETE ALL THE BEHAVIORAL HEALTH SCREENINGS?: YES

## 2024-06-10 SDOH — SOCIAL STABILITY: SOCIAL INSECURITY: ARE YOU OR HAVE YOU BEEN THREATENED OR ABUSED PHYSICALLY, EMOTIONALLY, OR SEXUALLY BY ANYONE?: NO

## 2024-06-10 SDOH — SOCIAL STABILITY: SOCIAL INSECURITY: ABUSE: ADULT

## 2024-06-10 SDOH — SOCIAL STABILITY: SOCIAL INSECURITY: DO YOU FEEL ANYONE HAS EXPLOITED OR TAKEN ADVANTAGE OF YOU FINANCIALLY OR OF YOUR PERSONAL PROPERTY?: NO

## 2024-06-10 SDOH — SOCIAL STABILITY: SOCIAL INSECURITY: HAVE YOU HAD ANY THOUGHTS OF HARMING ANYONE ELSE?: NO

## 2024-06-10 SDOH — SOCIAL STABILITY: SOCIAL INSECURITY: HAVE YOU HAD THOUGHTS OF HARMING ANYONE ELSE?: NO

## 2024-06-10 SDOH — SOCIAL STABILITY: SOCIAL INSECURITY: ARE THERE ANY APPARENT SIGNS OF INJURIES/BEHAVIORS THAT COULD BE RELATED TO ABUSE/NEGLECT?: NO

## 2024-06-10 SDOH — SOCIAL STABILITY: SOCIAL INSECURITY: DOES ANYONE TRY TO KEEP YOU FROM HAVING/CONTACTING OTHER FRIENDS OR DOING THINGS OUTSIDE YOUR HOME?: NO

## 2024-06-10 SDOH — SOCIAL STABILITY: SOCIAL INSECURITY: HAS ANYONE EVER THREATENED TO HURT YOUR FAMILY OR YOUR PETS?: NO

## 2024-06-10 SDOH — SOCIAL STABILITY: SOCIAL INSECURITY: DO YOU FEEL UNSAFE GOING BACK TO THE PLACE WHERE YOU ARE LIVING?: NO

## 2024-06-10 ASSESSMENT — LIFESTYLE VARIABLES
AUDIT-C TOTAL SCORE: 0
SKIP TO QUESTIONS 9-10: 1
HOW MANY STANDARD DRINKS CONTAINING ALCOHOL DO YOU HAVE ON A TYPICAL DAY: PATIENT DOES NOT DRINK
HOW OFTEN DO YOU HAVE 6 OR MORE DRINKS ON ONE OCCASION: NEVER
AUDIT-C TOTAL SCORE: 0
HOW OFTEN DO YOU HAVE A DRINK CONTAINING ALCOHOL: NEVER

## 2024-06-10 ASSESSMENT — PAIN - FUNCTIONAL ASSESSMENT
PAIN_FUNCTIONAL_ASSESSMENT: 0-10
PAIN_FUNCTIONAL_ASSESSMENT: 0-10

## 2024-06-10 ASSESSMENT — ACTIVITIES OF DAILY LIVING (ADL)
BATHING: NEEDS ASSISTANCE
HEARING - LEFT EAR: DIFFICULTY WITH NOISE
HEARING - RIGHT EAR: DIFFICULTY WITH NOISE
LACK_OF_TRANSPORTATION: PATIENT DECLINED
ADL_ASSISTANCE: INDEPENDENT
WALKS IN HOME: NEEDS ASSISTANCE
FEEDING YOURSELF: NEEDS ASSISTANCE
GROOMING: NEEDS ASSISTANCE
ADEQUATE_TO_COMPLETE_ADL: YES
ASSISTIVE_DEVICE: WALKER
PATIENT'S MEMORY ADEQUATE TO SAFELY COMPLETE DAILY ACTIVITIES?: YES
LACK_OF_TRANSPORTATION: NO
DRESSING YOURSELF: NEEDS ASSISTANCE
TOILETING: NEEDS ASSISTANCE
JUDGMENT_ADEQUATE_SAFELY_COMPLETE_DAILY_ACTIVITIES: YES

## 2024-06-10 ASSESSMENT — ENCOUNTER SYMPTOMS
ARTHRALGIAS: 1
DIARRHEA: 0
AGITATION: 0
JOINT SWELLING: 1
RESPIRATORY NEGATIVE: 1
HEMATOLOGIC/LYMPHATIC NEGATIVE: 1
NEUROLOGICAL NEGATIVE: 1
FEVER: 0
EYES NEGATIVE: 1
CHILLS: 1
PSYCHIATRIC NEGATIVE: 1
ALLERGIC/IMMUNOLOGIC NEGATIVE: 1
COUGH: 0
ABDOMINAL PAIN: 0
CHILLS: 0
CARDIOVASCULAR NEGATIVE: 1
GASTROINTESTINAL NEGATIVE: 1
NAUSEA: 0
VOMITING: 0
WHEEZING: 0
SHORTNESS OF BREATH: 0
LIGHT-HEADEDNESS: 0
DIZZINESS: 0
FATIGUE: 0

## 2024-06-10 ASSESSMENT — COGNITIVE AND FUNCTIONAL STATUS - GENERAL
DRESSING REGULAR UPPER BODY CLOTHING: A LITTLE
DRESSING REGULAR LOWER BODY CLOTHING: A LITTLE
CLIMB 3 TO 5 STEPS WITH RAILING: TOTAL
EATING MEALS: A LITTLE
MOVING TO AND FROM BED TO CHAIR: A LITTLE
STANDING UP FROM CHAIR USING ARMS: A LITTLE
TOILETING: A LITTLE
MOVING FROM LYING ON BACK TO SITTING ON SIDE OF FLAT BED WITH BEDRAILS: A LITTLE
WALKING IN HOSPITAL ROOM: A LITTLE
DRESSING REGULAR UPPER BODY CLOTHING: A LITTLE
DAILY ACTIVITIY SCORE: 17
MOVING TO AND FROM BED TO CHAIR: A LOT
MOVING FROM LYING ON BACK TO SITTING ON SIDE OF FLAT BED WITH BEDRAILS: A LOT
STANDING UP FROM CHAIR USING ARMS: A LOT
EATING MEALS: A LITTLE
DAILY ACTIVITIY SCORE: 17
TOILETING: A LITTLE
MOBILITY SCORE: 17
TURNING FROM BACK TO SIDE WHILE IN FLAT BAD: A LOT
TURNING FROM BACK TO SIDE WHILE IN FLAT BAD: A LITTLE
MOBILITY SCORE: 10
DRESSING REGULAR LOWER BODY CLOTHING: A LITTLE
STANDING UP FROM CHAIR USING ARMS: A LITTLE
MOVING FROM LYING ON BACK TO SITTING ON SIDE OF FLAT BED WITH BEDRAILS: A LITTLE
CLIMB 3 TO 5 STEPS WITH RAILING: A LOT
HELP NEEDED FOR BATHING: A LOT
WALKING IN HOSPITAL ROOM: TOTAL
MOBILITY SCORE: 17
MOVING TO AND FROM BED TO CHAIR: A LITTLE
PERSONAL GROOMING: A LITTLE
TURNING FROM BACK TO SIDE WHILE IN FLAT BAD: A LITTLE
HELP NEEDED FOR BATHING: A LOT
PATIENT BASELINE BEDBOUND: NO
CLIMB 3 TO 5 STEPS WITH RAILING: A LOT
PERSONAL GROOMING: A LITTLE
WALKING IN HOSPITAL ROOM: A LITTLE

## 2024-06-10 ASSESSMENT — COLUMBIA-SUICIDE SEVERITY RATING SCALE - C-SSRS
6. HAVE YOU EVER DONE ANYTHING, STARTED TO DO ANYTHING, OR PREPARED TO DO ANYTHING TO END YOUR LIFE?: NO
1. IN THE PAST MONTH, HAVE YOU WISHED YOU WERE DEAD OR WISHED YOU COULD GO TO SLEEP AND NOT WAKE UP?: NO
2. HAVE YOU ACTUALLY HAD ANY THOUGHTS OF KILLING YOURSELF?: NO

## 2024-06-10 ASSESSMENT — PAIN DESCRIPTION - LOCATION: LOCATION: GENERALIZED

## 2024-06-10 ASSESSMENT — PATIENT HEALTH QUESTIONNAIRE - PHQ9
SUM OF ALL RESPONSES TO PHQ9 QUESTIONS 1 & 2: 0
1. LITTLE INTEREST OR PLEASURE IN DOING THINGS: NOT AT ALL
2. FEELING DOWN, DEPRESSED OR HOPELESS: NOT AT ALL

## 2024-06-10 ASSESSMENT — PAIN SCALES - GENERAL: PAINLEVEL_OUTOF10: 3

## 2024-06-10 NOTE — CARE PLAN
The patient's goals for the shift include      The clinical goals for the shift include patient will be free of falls    Over the shift, the patient did not make progress toward the following goals. Barriers to progression include . Recommendations to address these barriers include   Problem: Pain - Adult  Goal: Verbalizes/displays adequate comfort level or baseline comfort level  Outcome: Progressing     Problem: Safety - Adult  Goal: Free from fall injury  Outcome: Progressing     Problem: Discharge Planning  Goal: Discharge to home or other facility with appropriate resources  Outcome: Progressing     Problem: Chronic Conditions and Co-morbidities  Goal: Patient's chronic conditions and co-morbidity symptoms are monitored and maintained or improved  Outcome: Progressing   .

## 2024-06-10 NOTE — PROGRESS NOTES
Pharmacy Medication History Review    Marian Fritz is a 89 y.o. female admitted for Hypoxia. Pharmacy reviewed the patient's [id number] medications and allergies for accuracy.    The list below reflectives the updated PTA list. Please review each medication in order reconciliation for additional clarification and justification.  Prior to Admission Medications   Prescriptions Last Dose Informant   amLODIPine (Norvasc) 5 mg tablet Unknown Family Member   Sig: Take 1 tablet (5 mg) by mouth once daily.   apixaban (Eliquis) 2.5 mg tablet 6/9/2024 Family Member   Sig: Take 1 tablet (2.5 mg) by mouth 2 times a day.   atorvastatin (Lipitor) 40 mg tablet 6/8/2024 Family Member   Sig: Take 1 tablet (40 mg) by mouth once daily.   biotin 5 mg capsule 6/9/2024 Family Member   Sig: Take by mouth.   carvedilol (Coreg) 6.25 mg tablet 6/9/2024 Family Member   Sig: take one tablet every morning and evening.   cholecalciferol (Vitamin D-3) 25 MCG (1000 UT) tablet Unknown Family Member   Sig: Take 1 tablet (25 mcg) by mouth once daily.   furosemide (Lasix) 20 mg tablet     Sig: Take 0.5-1 tablets (10-20 mg) by mouth once daily.   levETIRAcetam (Keppra) 750 mg tablet 6/9/2024 Family Member   Sig: Take 1 tablet (750 mg) by mouth 2 times a day.   pantoprazole (ProtoNix) 20 mg EC tablet 6/9/2024 Family Member   Sig: Take 1 tablet (20 mg) by mouth once daily.   potassium chloride CR 10 mEq ER tablet  Family Member   Sig: Take 1 tablet (10 mEq) by mouth every other day.   vitamins A,C,E-zinc-copper (PreserVision AREDS) 4,296 mcg-226 mg-90 mg capsule 6/9/2024 Family Member   Sig: Take 1 capsule by mouth twice a day.      Facility-Administered Medications: None         The list below reflectives the updated allergy list. Please review each documented allergy for additional clarification and justification.  Allergies  Reviewed by Leela PARKER RN on 6/9/2024        Severity Reactions Comments    Bee Venom Protein (honey Bee)  Not Specified Other             Below are additional concerns with the patient's PTA list.  Spoke to family member at bedside for med list    Brigid Pritchard

## 2024-06-10 NOTE — TELEPHONE ENCOUNTER
PT daughter called in and stated that she was admitted into [facility] ED and noticed that the medication she takes at home is different then what she is taking at the ED and her daughter would like to know why the PT  is not taking RX: Amlodipine or Losartan                  PLEASE PEND AND SEND

## 2024-06-10 NOTE — DISCHARGE INSTRUCTIONS
Ortho DC Instructions:  Nonweightbearing left upper extremity  Sling on at all times except for hygiene and elbow range of motion  Follow up with Dr Evans in office, call for appointment

## 2024-06-10 NOTE — CARE PLAN
Problem: Pain - Adult  Goal: Verbalizes/displays adequate comfort level or baseline comfort level  6/10/2024 0209 by Modesto Escobar RN  Outcome: Progressing  6/10/2024 0205 by Modesto Escobar RN  Outcome: Progressing     Problem: Safety - Adult  Goal: Free from fall injury  6/10/2024 0209 by Modesto Escobar RN  Outcome: Progressing  6/10/2024 0205 by Modesto Escobar RN  Outcome: Progressing     Problem: Discharge Planning  Goal: Discharge to home or other facility with appropriate resources  6/10/2024 0209 by Modesto Escobar RN  Outcome: Progressing  6/10/2024 0205 by Modesto Escobar RN  Outcome: Progressing     Problem: Chronic Conditions and Co-morbidities  Goal: Patient's chronic conditions and co-morbidity symptoms are monitored and maintained or improved  6/10/2024 0209 by Modesto Escobar RN  Outcome: Progressing  6/10/2024 0205 by Modesto Escobar RN  Outcome: Progressing     Problem: Skin  Goal: Decreased wound size/increased tissue granulation at next dressing change  Outcome: Progressing  Goal: Participates in plan/prevention/treatment measures  Outcome: Progressing  Goal: Prevent/manage excess moisture  Outcome: Progressing  Flowsheets (Taken 6/10/2024 0209)  Prevent/manage excess moisture: Cleanse incontinence/protect with barrier cream  Goal: Prevent/minimize sheer/friction injuries  Outcome: Progressing  Goal: Promote/optimize nutrition  Outcome: Progressing  Goal: Promote skin healing  Outcome: Progressing   The patient's goals for the shift include      The clinical goals for the shift include patient will be free of falls    Over the shift, the patient did not make progress toward the following goals. Barriers to progression include . Recommendations to address these barriers include .

## 2024-06-10 NOTE — PROGRESS NOTES
Marian Fritz is a 89 y.o. female     Patient laying comfortably in bed  Left shoulder pain controlled  Discussed with daughter-in-law at bedside    Review of Systems     Constitutional: no fever, no chills,   Cardiovascular: no chest pain   Respiratory: no cough, wheezing or shortness of breath a  Gastrointestinal: no abdominal pain, no constipation, no melena, no nausea, no diarrhea, no vomiting and no blood in stools.   Neurological: no headache,   All other systems have been reviewed and are negative for complaint.       Vitals:    06/10/24 1142   BP: 118/68   Pulse:    Resp:    Temp: 36.7 °C (98 °F)   SpO2: 99%        Scheduled medications  apixaban, 2.5 mg, oral, BID  atorvastatin, 40 mg, oral, Daily  azithromycin, 500 mg, intravenous, q24h  carvedilol, 6.25 mg, oral, BID  cefTRIAXone, 1 g, intravenous, q24h  levETIRAcetam, 750 mg, oral, BID  [Held by provider] losartan, 50 mg, oral, Daily  melatonin, 3 mg, oral, Nightly  pantoprazole, 40 mg, oral, Daily before breakfast   Or  pantoprazole, 40 mg, intravenous, Daily before breakfast      Continuous medications     PRN medications  PRN medications: acetaminophen, acetaminophen, hydrALAZINE **FOLLOWED BY** [START ON 6/12/2024] hydrALAZINE, ondansetron ODT **OR** ondansetron, oxygen    Lab Review   Results from last 7 days   Lab Units 06/10/24  0627 06/09/24  1640   WBC AUTO x10*3/uL 11.4* 17.1*   HEMOGLOBIN g/dL 12.9 14.1   HEMATOCRIT % 37.9 41.4   PLATELETS AUTO x10*3/uL 188 218     Results from last 7 days   Lab Units 06/10/24  0627 06/09/24  1640   SODIUM mmol/L 140 140   POTASSIUM mmol/L 3.7 3.6   CHLORIDE mmol/L 105 104   CO2 mmol/L 27 26   BUN mg/dL 32* 38*   CREATININE mg/dL 0.89 0.88   CALCIUM mg/dL 7.9* 8.5*   PROTEIN TOTAL g/dL 5.5* 6.2*   BILIRUBIN TOTAL mg/dL 0.8 1.0   ALK PHOS U/L 56 66   ALT U/L 23 27   AST U/L 14 17   GLUCOSE mg/dL 106* 241*            CT shoulder left wo IV contrast   Final Result   1. Acute oblique fracture through left  humeral neck (predominantly   extending through anatomic neck of the humerus) superimposed on   remote fracture union of proximal humeral metadiaphysis as described   above. There is impaction of fracture fragments with anterior   angulation of fracture apex as described above.   2. Moderate volume glenohumeral joint effusion, likely favored to be   reactive. Linearly oriented calcifications in the expected region of   distal rotator cuff tendon could be related to hydroxyapatite   deposition versus sequela of chronic synovitis with associated   dystrophic calcifications.   3. Mild-to-moderate glenohumeral and moderate to severe   acromioclavicular joint arthrosis.   4. Findings concerning for age-indeterminate nondisplaced fractures   through lateral aspect of left 3rd through 5th ribs. Recommend   correlation with point tenderness in this location.             MACRO:   Critical Finding:  See findings. Notification was initiated on   6/10/2024 at 11:23 am by Dr. Sheridan Trent.  (**-YCF-**)        Signed by: Sheridan Trent 6/10/2024 11:23 AM   Dictation workstation:   [id number]      CT head wo IV contrast   Final Result   1. No evidence of acute intracranial abnormality.   2. Moderate to severe generalized volume loss and progression of   white matter hypodensities since 2022, consistent with advanced   microvascular ischemic disease. There are areas of encephalomalacia   consistent with infarcts, right parietal and left medial temporal   lobes; right parietal infarct not obviously present previously but   does not have acute features. The left temporal lobe infarct has   evolved since prior. Left thalamic lacunar infarcts are new since   prior without acute features. If concern for acute infarct, MRI is   suggested. No hemorrhage.   3. No evidence of acute cervical spine fracture.   4. Multilevel severe cervical spondylosis, as above.                  Signed by: Betty Adams 6/9/2024 7:02 PM   Dictation  workstation:   [id number]      CT cervical spine wo IV contrast   Final Result   1. No evidence of acute intracranial abnormality.   2. Moderate to severe generalized volume loss and progression of   white matter hypodensities since 2022, consistent with advanced   microvascular ischemic disease. There are areas of encephalomalacia   consistent with infarcts, right parietal and left medial temporal   lobes; right parietal infarct not obviously present previously but   does not have acute features. The left temporal lobe infarct has   evolved since prior. Left thalamic lacunar infarcts are new since   prior without acute features. If concern for acute infarct, MRI is   suggested. No hemorrhage.   3. No evidence of acute cervical spine fracture.   4. Multilevel severe cervical spondylosis, as above.                  Signed by: Betty Adams 6/9/2024 7:02 PM   Dictation workstation:   [id number]      XR pelvis 1-2 views   Final Result   No acute bony abnormalities on x-ray examination of the AP pelvis.   Signed by Trina Roth,       XR chest 1 view   Final Result   Multilobar subtle patchy airspace opacities.  Please correlate for   pneumonia.   Signed by Darrel Robles DO      XR shoulder right 2+ views   Final Result   Degenerative changes without acute fracture or malalignment.    Narrowing of subacromial space suggesting rotator cuff pathology.    Calcific tendinopathy of the supraspinatus tendon.   Signed by Darrel Robles DO      XR shoulder left 2+ views   Final Result   Chronic deformity of the proximal humerus without acute fracture or   malalignment.  Degenerative changes.   Signed by Darrel Robles DO      Transthoracic Echo (TTE) Complete    (Results Pending)         Physical Exam     Constitutional   General appearance: Alert and in no acute distress.     Pulmonary   Respiratory assessment: No respiratory distress, normal respiratory rhythm and effort.    Auscultation of Lungs: Clear bilateral  breath sounds.   Cardiovascular   Auscultation of heart: Apical pulse normal, heart rate and rhythm normal, normal S1 and S2, no murmurs and no pericardial rub.    Exam for edema: No peripheral edema.   Abdomen   Abdominal Exam: No bruits, normal bowel sounds, soft, non-tender, no abdominal mass palpated.    Liver and Spleen exam: No hepato-splenomegaly.   Musculoskeletal   Examination of gait: ROM intact  Skin inspection: Normal skin color and pigmentation, normal skin turgor and no visible rash.   Neurologic   Cranial nerves: Nerves 2-12 were intact, no focal neuro defects.     Assessment/Plan      #Lacunar infarct  As seen on CAT scan  Unable to do MRI because of pacemaker  Patient already on Eliquis  Will consult neurology  Check echocardiogram    #Left humeral oblique fracture s/p fall at home  Consult orthopedics  Will need cardiac clearance if surgery is contemplated  Continue pain control  PT OT consulted    #Community-acquired pneumonia  ?  Patient is asymptomatic  Continue antibiotics  Check procalcitonin    #Hypertension  Permissive hypertension for now    #Dyslipidemia  Continue statins with a target LDL less than 70    #Atrial fibrillation  Rate controlled  Continue Eliquis    #Seizure disorder  Stable on Keppra

## 2024-06-10 NOTE — PROGRESS NOTES
06/10/24 1823   Discharge Planning   Living Arrangements Children  (lives with daughter Susie [phone])   Support Systems Children;Family members   Assistance Needed PTA; Independent w/dressing,dependent with bathing, uses a walker   Type of Residence Private residence  (demo correct)   Number of Stairs to Enter Residence 3   Number of Stairs Within Residence 0   Home or Post Acute Services None   Patient expects to be discharged to: Home-OT rec pending,PT recommended further rehab   Does the patient need discharge transport arranged? Yes   RoundTrip coordination needed? Yes   Has discharge transport been arranged? No   Financial Resource Strain   How hard is it for you to pay for the very basics like food, housing, medical care, and heating? Not very   Housing Stability   In the last 12 months, was there a time when you were not able to pay the mortgage or rent on time? N   In the last 12 months, how many places have you lived? 1   In the last 12 months, was there a time when you did not have a steady place to sleep or slept in a shelter (including now)? N   Transportation Needs   In the past 12 months, has lack of transportation kept you from medical appointments or from getting medications? no  (PCP listed last visit 6 months ago, daughter Devorah drives to appointments, pharmacy; [facility])   In the past 12 months, has lack of transportation kept you from meetings, work, or from getting things needed for daily living? No     Care Coordinator Note:  Met patient and family/daughter Devorah at bedside to discuss discharge planning; explained my role as transitional care coordinator  Plan: patient was admitted d/t hypoxia; consulted by Neurology; no acute neurological interventions at this time; per Orthopaedic surgery Left humeral neck fracture  -conservative treatment  -nonweightbearing LUE  -Sling LUE, ok to remove for elbow ROM and hygiene, no shoulder abduction    Merissa NEWSOME, RN TCC

## 2024-06-10 NOTE — CARE PLAN
D/w Dr. Vuong who does not feel consult is needed, cancelled. Please call back if needed.    Rocky Nicolas MD  [facility]  Office #[phone]

## 2024-06-10 NOTE — PROGRESS NOTES
Physical Therapy    Physical Therapy Evaluation & Treatment    Patient Name: Marian Fritz  MRN: [id number]  Today's Date: 6/10/2024   Time Calculation  Start Time: 1054  Stop Time: 1120  Time Calculation (min): 26 min    Assessment/Plan   PT Assessment  PT Assessment Results: Decreased strength, Decreased range of motion, Decreased endurance, Impaired balance, Decreased mobility, Decreased cognition, Pain  Rehab Prognosis: Good  Barriers to Discharge: none for PT  Evaluation/Treatment Tolerance: Patient limited by fatigue, Patient limited by pain  Medical Staff Made Aware: Yes  Strengths: Access to adaptive/assistive products, Housing layout, Support of extended family/friends, Premorbid level of function  Barriers to Participation: Comorbidities  End of Session Communication: Bedside nurse  Assessment Comment: Pt presents with weakness, decreased ambulation and transfers, and severe unsteadiness; can benefit from skilled PT intervention to assist with discharge planning and address the aforementioned issues to enable the pt to return to their prior level of function, which was independent with ww.  End of Session Patient Position: Up in chair, Alarm on   IP OR SWING BED PT PLAN  Inpatient or Swing Bed: Inpatient  PT Plan  Treatment/Interventions: Bed mobility, Transfer training, Gait training, Balance training, Strengthening, Endurance training, Range of motion, Therapeutic exercise, Therapeutic activity  PT Plan: Skilled PT  PT Frequency: 3 times per week  PT Discharge Recommendations: Moderate intensity level of continued care  PT Recommended Transfer Status: Assist x1  PT - OK to Discharge: Yes (PT POC established)      Subjective     General Visit Information:  General  Reason for Referral: Pt brought to ED by family after finding the pt down between her bed and dresser. Pt has no recollection what happened, down time unknown.  X-rays and CTH negative for acute findings, CT for L shoulder pending.  Past  Medical History Relevant to Rehab:   Past Medical History:   Diagnosis Date    Personal history of other diseases of the circulatory system     History of hypertension    Personal history of other diseases of the nervous system and sense organs 12/16/2013    History of complex partial epilepsy    Pure hypercholesterolemia, unspecified     High cholesterol    Unspecified cataract     Cataracts, both eyes    Unspecified convulsions (Multi)     Seizures     Past Surgical History:   Procedure Laterality Date    CATARACT EXTRACTION  12/01/2014    Cataract Surgery    CT ANGIO NECK  9/16/2018    CT NECK ANGIO W AND WO IV CONTRAST 9/16/2018 [facility]    CT HEAD ANGIO W AND WO IV CONTRAST  9/16/2018    CT HEAD ANGIO W AND WO IV CONTRAST 9/16/2018 [facility]    MR HEAD ANGIO WO IV CONTRAST  11/28/2018    MR HEAD ANGIO WO IV CONTRAST 11/28/2018 [facility]    MR NECK ANGIO WO IV CONTRAST  11/28/2018    MR NECK ANGIO WO IV CONTRAST 11/28/2018 [facility] INPATIENT [facility]GAC[facility]    OTHER SURGICAL HISTORY  12/01/2014    Simple Mastectomy Right Breast       Family/Caregiver Present: Yes  Caregiver Feedback: family present for most of eval  Prior to Session Communication: Bedside nurse  Patient Position Received: Bed, 3 rail up, Alarm on  Preferred Learning Style: kinesthetic, auditory  General Comment: Pt is pleasant and cooperative, receptive to PT. Does guard L arm. PT treated LUE NWB at this time until CT results are back.  Home Living:  Home Living  Type of Home: House  Lives With:  (dtr and son-in-law, -24/7)  Home Adaptive Equipment: Walker rolling or standard  Home Layout: Able to live on main level with bedroom/bathroom, Multi-level  Home Access: Stairs to enter without rails  Entrance Stairs-Rails: None  Entrance Stairs-Number of Steps: 3  Prior Level of Function:  Prior Function Per Pt/Caregiver Report  ADL Assistance: Independent  Ambulatory Assistance: Independent (with ww in home; only goes out in the  community with assist/supervision, assist for stairs)  Leisure: casino  Precautions:  Precautions  Hearing/Visual Limitations: Tribal  UE Weight Bearing Status: Left Non-Weight Bearing (until CT results read)  Medical Precautions: Fall precautions, Oxygen therapy device and L/min    Objective   Pain:  Pain Assessment  Pain Assessment: 0-10  Pain Score:  (reports no pain, but moans in pain during mobility and guards LUE; did endorse pain in toes with WBing.)  Pain Type: Acute pain  Cognition:  Cognition  Overall Cognitive Status: Impaired at baseline  Orientation Level: Disoriented to time  Following Commands: Follows one step commands without difficulty  Memory:  (STM loss from previous CVA)  Insight: Mild  Impulsive: Within functional limits    General Assessments:   Activity Tolerance  Endurance: Tolerates less than 10 min exercise, no significant change in vital signs    Perception  Inattention/Neglect: Appears intact  Initiation: Appears intact  Motor Planning: Appears intact    Static Sitting Balance  Static Sitting-Balance Support: Feet supported, No upper extremity supported  Static Sitting-Level of Assistance: Close supervision  Dynamic Sitting Balance  Dynamic Sitting-Balance Support: Feet unsupported, Right upper extremity supported  Dynamic Sitting-Balance: Trunk control activities  Dynamic Sitting-Comments: CGA with posterior lean    Static Standing Balance  Static Standing-Balance Support: Right upper extremity supported  Static Standing-Level of Assistance: Moderate assistance  Static Standing-Comment/Number of Minutes: flexed posture, difficulty with B hip and knee ext to fully upright, pt moaning as if in pain  Dynamic Standing Balance  Dynamic Standing-Balance Support: Right upper extremity supported  Dynamic Standing-Balance: Turning  Dynamic Standing-Comments: mod A  Functional Assessments:  Bed Mobility  Bed Mobility: Yes  Bed Mobility 1  Bed Mobility 1: Supine to sitting  Level of Assistance 1:  Maximum assistance, Moderate verbal cues, Moderate tactile cues  Bed Mobility Comments 1: Pt educated in bed mobility technique moving supine->sit via sidelying; pt requires minimal VC's for technique and proper UE placements for upper body initiate rolling and to use R UE to reach over for opposite bedrail, and to use BUE to push up into sitting from sidelying. Pt needs assist with BLE and lifting trunk off surface of the bed.    Transfers  Transfer: Yes  Transfer 1  Transfer From 1: Bed to  Transfer to 1: Stand  Technique 1: Sit to stand  Transfer Device 1: Gait belt  Transfer Level of Assistance 1: Maximum assistance, Moderate verbal cues, Moderate tactile cues  Trials/Comments 1: Pt provided instruction in safe sit<->stand technique to enable them to move out of bed safely; pt required moderate tactile and verbal cues to place R hand on sitting surface and to scoot to edge of sitting surface to facilitate ease of sit->stand.  Extremity/Trunk Assessments:  RUE   RUE :  (grossly 4/5)  LUE   LUE:  (Pt unable to elevate LUE, elbow flexion at least 3/5.)  RLE   RLE :  (grossly 4/5)  LLE   LLE :  (grossly 4/5)  Treatments:  Balance/Neuromuscular Re-Education  Balance/Neuromuscular Re-Education Activity Performed: Yes  Balance/Neuromuscular Re-Education Activity 1: To increase standing balance, core strength, functional endurance, and facilitate postural/trunk control, pt stood with R handheld assist. Max verbal cues for upright posture, only able to tolerate 15 seconds with max A.. Duration limited d/t fatigue.    Ambulation/Gait Training  Ambulation/Gait Training Performed: Yes  Ambulation/Gait Training 1  Surface 1: Level tile  Device 1:  (R handheld assist)  Gait Support Devices: Gait belt  Assistance 1: Moderate assistance, Moderate verbal cues, Moderate tactile cues  Quality of Gait 1: Forward flexed posture, Decreased step length, Diminished heel strike  Comments/Distance (ft) 1: 2' to chair (Pt unable to amb  away from chair on 2nd attempt, requests to sit back down.)  Transfers  Transfer: Yes  Transfers 2  Transfer From 2: Stand to  Transfer to 2: Chair with arms  Technique 2: Stand to sit  Transfer Device 2: Gait belt  Transfer Level of Assistance 2: Maximum assistance, Moderate verbal cues, Moderate tactile cues  Trials/Comments 2: Verbal and tactile cues to line up to and reach back for sitting surface before sitting. Hands on assist needed to guide her R hand to armrest.  Transfers 3  Technique 3: Sit to stand, Stand to sit  Transfer Device 3: Gait belt  Transfer Level of Assistance 3: Moderate assistance, Moderate verbal cues, Moderate tactile cues  Trials/Comments 3: Cues needed for proper hand placement,  Outcome Measures:  [facility] Basic Mobility  Turning from your back to your side while in a flat bed without using bedrails: A lot  Moving from lying on your back to sitting on the side of a flat bed without using bedrails: A lot  Moving to and from bed to chair (including a wheelchair): A lot  Standing up from a chair using your arms (e.g. wheelchair or bedside chair): A lot  To walk in hospital room: Total  Climbing 3-5 steps with railing: Total  Basic Mobility - Total Score: 10    Encounter Problems       Encounter Problems (Active)       Balance       STG - Maintains static standing balance with R upper extremity support x 5 minutes with CGA       Start:  06/10/24    Expected End:  06/24/24       INTERVENTIONS:  1. Practice standing with minimal support.  2. Educate patient about standing tolerance.  3. Educate patient about independence with gait, transfers, and ADL's.  4. Educate patient about use of assistive device.  5. Educate patient about self-directed care.         STG - Maintains dynamic sitting balance with R upper extremity support x 5 minutes with supervision       Start:  06/10/24    Expected End:  06/24/24       INTERVENTIONS:  1. Practice sitting on the edge of a bed/mat with minimal support.  2.  Educate patient about maintining total hip precautions while maintaining balance.  3. Educate patient about pressure relief.  4. Educate patient about use of assistive device.            Mobility       STG - Patient will ambulate with R handheld assist or QC 50' x 2 with min A       Start:  06/10/24    Expected End:  06/24/24            Increase BLE strength to attain functional goals achieved through supine, seated, and standing TE.       Start:  06/10/24    Expected End:  06/24/24               PT Transfers       STG - Transfer from bed to chair with R handheld assist or QC with CGA       Start:  06/10/24    Expected End:  06/24/24            STG - Patient to transfer to and from sit to supine with CGA       Start:  06/10/24    Expected End:  06/24/24            STG - Patient will transfer sit to and from stand with R handheld assist or QC with CGA.       Start:  06/10/24    Expected End:  06/24/24                   Education Documentation  Precautions, taught by Virgie Reagan, PT at 6/10/2024 11:57 AM.  Learner: Family, Patient  Readiness: Acceptance  Method: Explanation  Response: Demonstrated Understanding, Needs Reinforcement    Body Mechanics, taught by Virgie Reagan, PT at 6/10/2024 11:57 AM.  Learner: Family, Patient  Readiness: Acceptance  Method: Explanation  Response: Demonstrated Understanding, Needs Reinforcement    Mobility Training, taught by Virgie Reagan, PT at 6/10/2024 11:57 AM.  Learner: Family, Patient  Readiness: Acceptance  Method: Explanation  Response: Demonstrated Understanding, Needs Reinforcement    Education Comments  No comments found.

## 2024-06-10 NOTE — H&P
History Of Present Illness  Marian Fritz is a 89 y.o. female with a PMH of seizure, TIA, HFpEF (echo 2022 LVEF 70-75%), AV block s/p pacemaker, GERD, HTN, HLD, DVT presenting with fall.  Today around 1pm, Ms Fritz was found on her bedroom floor lying on her back. She had fallen between the bed and the night stand.   She was taken into the kitchen where she developed shaking chills. She was also unable to move her LUE due to severe pain, therefore unable to ambulate with her walker.   She denies cough, cough with eating, dysuria, increased urinary frequency.   She does not remember the fall; her daughters state that she has short term memory loss.   Appetite has been ok.   She is oriented to place and person, does not know the year. She does not appear ill, and is making jokes and carrying on normal conversation.     In the ED, her WBC ct is elevated at 17.   Blood cultures x 2 pending.   No UA yet.   Xrays of her pelvis, right and left shoulders show no bony abnormalities.   CT head- advanced MVID, chronic changes secondary to stroke, nothing acute.   CXR showed multilobar subtle patchy airspace opacities.        Past Medical History  Past Medical History:   Diagnosis Date    Personal history of other diseases of the circulatory system     History of hypertension    Personal history of other diseases of the nervous system and sense organs 12/16/2013    History of complex partial epilepsy    Pure hypercholesterolemia, unspecified     High cholesterol    Unspecified cataract     Cataracts, both eyes    Unspecified convulsions (Multi)     Seizures       Surgical History  Past Surgical History:   Procedure Laterality Date    CATARACT EXTRACTION  12/01/2014    Cataract Surgery    CT ANGIO NECK  9/16/2018    CT NECK ANGIO W AND WO IV CONTRAST 9/16/2018 [facility]    CT HEAD ANGIO W AND WO IV CONTRAST  9/16/2018    CT HEAD ANGIO W AND WO IV CONTRAST 9/16/2018 [facility]    MR HEAD ANGIO WO IV  CONTRAST  11/28/2018    MR HEAD ANGIO WO IV CONTRAST 11/28/2018 [facility] [place] [facility]    MR NECK ANGIO WO IV CONTRAST  11/28/2018    MR NECK ANGIO WO IV CONTRAST 11/28/2018 [facility] [place] IN[facility]    OTHER SURGICAL HISTORY  12/01/2014    Simple Mastectomy Right Breast        Social History  She reports that she has never smoked. She has never used smokeless tobacco. She reports that she does not drink alcohol and does not use drugs.    Family History  Family History   Problem Relation Name Age of Onset    Heart failure Mother      Tracheoesophageal fistual Father      Pancreatic cancer Sister      Coronary artery disease Brother      Pancreatic cancer Brother          Allergies  Bee venom protein (honey bee)    Review of Systems   Constitutional:  Positive for chills. Negative for fatigue and fever.   HENT: Negative.     Eyes: Negative.    Respiratory: Negative.  Negative for cough, shortness of breath and wheezing.    Cardiovascular: Negative.    Gastrointestinal: Negative.  Negative for diarrhea, nausea and vomiting.        BM soft, but no diarrhea   Genitourinary: Negative.    Musculoskeletal:  Positive for arthralgias.        Left shoulder pain   Skin: Negative.    Allergic/Immunologic: Negative.    Neurological: Negative.    Hematological: Negative.    Psychiatric/Behavioral: Negative.          Physical Exam  Vitals reviewed.   Constitutional:       Appearance: Normal appearance.      Comments: Pt is oriented to person and place  No distress.   No coughing, does not appear SOB.    HENT:      Head: Normocephalic and atraumatic.      Mouth/Throat:      Mouth: Mucous membranes are moist.   Eyes:      Extraocular Movements: Extraocular movements intact.      Conjunctiva/sclera: Conjunctivae normal.      Pupils: Pupils are equal, round, and reactive to light.   Cardiovascular:      Rate and Rhythm: Normal rate and regular rhythm.      Pulses: Normal pulses.      Heart sounds: Normal heart sounds.   Pulmonary:       "Effort: Pulmonary effort is normal.      Breath sounds: Normal breath sounds.      Comments: Normal breath sounds    Abdominal:      General: Abdomen is flat. Bowel sounds are normal.      Palpations: Abdomen is soft.   Musculoskeletal:         General: Normal range of motion.      Comments: Pain with movement of her left shoulder  Full ROM bilateral LE   Skin:     General: Skin is warm and dry.      Comments: Bruising bilateral knees, bilateral shoulders   Neurological:      General: No focal deficit present.      Mental Status: She is alert and oriented to person, place, and time.      Comments: No focal deficits   Psychiatric:         Mood and Affect: Mood normal.         Behavior: Behavior normal.         Thought Content: Thought content normal.         Judgment: Judgment normal.          Last Recorded Vitals  Blood pressure 146/61, pulse 62, temperature 36.8 °C (98.2 °F), temperature source Tympanic, resp. rate 17, height 1.473 m (4' 10\"), weight 47.6 kg (105 lb), SpO2 98%.    Relevant Results        Results for orders placed or performed during the hospital encounter of 06/09/24 (from the past 24 hour(s))   CBC and Auto Differential   Result Value Ref Range    WBC 17.1 (H) 4.4 - 11.3 x10*3/uL    nRBC 0.0 0.0 - 0.0 /100 WBCs    RBC 4.27 4.00 - 5.20 x10*6/uL    Hemoglobin 14.1 12.0 - 16.0 g/dL    Hematocrit 41.4 36.0 - 46.0 %    MCV 97 80 - 100 fL    MCH 33.0 26.0 - 34.0 pg    MCHC 34.1 32.0 - 36.0 g/dL    RDW 12.6 11.5 - 14.5 %    Platelets 218 150 - 450 x10*3/uL    Neutrophils % 84.6 40.0 - 80.0 %    Immature Granulocytes %, Automated 0.8 0.0 - 0.9 %    Lymphocytes % 5.2 13.0 - 44.0 %    Monocytes % 9.0 2.0 - 10.0 %    Eosinophils % 0.2 0.0 - 6.0 %    Basophils % 0.2 0.0 - 2.0 %    Neutrophils Absolute 14.49 (H) 1.60 - 5.50 x10*3/uL    Immature Granulocytes Absolute, Automated 0.13 0.00 - 0.50 x10*3/uL    Lymphocytes Absolute 0.89 0.80 - 3.00 x10*3/uL    Monocytes Absolute 1.54 (H) 0.05 - 0.80 x10*3/uL    " Eosinophils Absolute 0.04 0.00 - 0.40 x10*3/uL    Basophils Absolute 0.03 0.00 - 0.10 x10*3/uL   Comprehensive metabolic panel   Result Value Ref Range    Glucose 241 (H) 74 - 99 mg/dL    Sodium 140 136 - 145 mmol/L    Potassium 3.6 3.5 - 5.3 mmol/L    Chloride 104 98 - 107 mmol/L    Bicarbonate 26 21 - 32 mmol/L    Anion Gap 14 10 - 20 mmol/L    Urea Nitrogen 38 (H) 6 - 23 mg/dL    Creatinine 0.88 0.50 - 1.05 mg/dL    eGFR 60 (L) >60 mL/min/1.73m*2    Calcium 8.5 (L) 8.6 - 10.3 mg/dL    Albumin 3.5 3.4 - 5.0 g/dL    Alkaline Phosphatase 66 33 - 136 U/L    Total Protein 6.2 (L) 6.4 - 8.2 g/dL    AST 17 9 - 39 U/L    Bilirubin, Total 1.0 0.0 - 1.2 mg/dL    ALT 27 7 - 45 U/L   Creatine Kinase   Result Value Ref Range    Creatine Kinase 142 0 - 215 U/L   Magnesium   Result Value Ref Range    Magnesium 1.60 1.60 - 2.40 mg/dL   Blood Culture    Specimen: Peripheral Venipuncture; Blood culture   Result Value Ref Range    Blood Culture Loaded on Instrument - Culture in progress    Blood Culture    Specimen: Peripheral Venipuncture; Blood culture   Result Value Ref Range    Blood Culture Loaded on Instrument - Culture in progress    Urinalysis with Reflex Culture and Microscopic   Result Value Ref Range    Color, Urine Light-Yellow Light-Yellow, Yellow, Dark-Yellow    Appearance, Urine Clear Clear    Specific Gravity, Urine 1.014 1.005 - 1.035    pH, Urine 5.0 5.0, 5.5, 6.0, 6.5, 7.0, 7.5, 8.0    Protein, Urine NEGATIVE NEGATIVE, 10 (TRACE), 20 (TRACE) mg/dL    Glucose, Urine Normal Normal mg/dL    Blood, Urine NEGATIVE NEGATIVE    Ketones, Urine NEGATIVE NEGATIVE mg/dL    Bilirubin, Urine NEGATIVE NEGATIVE    Urobilinogen, Urine Normal Normal mg/dL    Nitrite, Urine NEGATIVE NEGATIVE    Leukocyte Esterase, Urine NEGATIVE NEGATIVE     XR shoulder left 2+ views    Result Date: 6/9/2024  STUDY: Shoulder Radiographs; 06/09/2024, 5:21 PM. INDICATION: Status post fall. Left shoulder pain. COMPARISON: CXR: 10/06/22. ACCESSION  NUMBER(S): [id number] ORDERING CLINICIAN: HANNAH LOPEZ TECHNIQUE:  Two view(s) of the left shoulder. FINDINGS:  Humeral head is intact.  There is deformity of the proximal humerus which appears chronic.  Glenohumeral joint alignment is maintained with mild arthrosis.  There is calcific tendinopathy of the supraspinatus tendon.  There is moderate arthrosis of the acromioclavicular joint.  There is left subclavian pacer device.      Chronic deformity of the proximal humerus without acute fracture or malalignment.  Degenerative changes. Signed by Darrel Robles DO    XR shoulder right 2+ views    Result Date: 6/9/2024  STUDY: Shoulder Radiographs; 6/9/2024 17:21 INDICATION: Fall. COMPARISON: None Available. ACCESSION NUMBER(S): [id number] ORDERING CLINICIAN: HANNAH LOPEZ TECHNIQUE:  Two view(s) of the right shoulder. FINDINGS:  There is no displaced fracture.  There is moderate arthrosis of the glenohumeral joint and acromioclavicular joint.  There is narrowing of the subacromial space.  There is calcific tendinopathy of the supraspinatus.  There are surgical clips in the right axilla.    Degenerative changes without acute fracture or malalignment. Narrowing of subacromial space suggesting rotator cuff pathology. Calcific tendinopathy of the supraspinatus tendon. Signed by Darrel Robles DO    XR chest 1 view    Result Date: 6/9/2024  STUDY: Chest Radiograph;  06/09/2024, 5:21 PM. INDICATION: Status post fall. Generalized weakness. COMPARISON: CT AP: 10/18/22; CXR: 10/06/22; CT chest: 06/08/22. ACCESSION NUMBER(S): [id number] ORDERING CLINICIAN: HANNAH LOPEZ TECHNIQUE:  Frontal chest was obtained at 17:21 hours. FINDINGS: CARDIOMEDIASTINAL SILHOUETTE: Heart size is mildly enlarged.  Left subclavian pacer device.  LUNGS: Lungs are hyperinflated.  Multilobar airspace opacities are demonstrated.  There is no pneumothorax or pleural effusion.  ABDOMEN: No remarkable upper abdominal findings.  BONES:  No acute osseous changes.  There are surgical clips right axilla. There is a chronic-appearing deformity of the left proximal humerus.    Multilobar subtle patchy airspace opacities.  Please correlate for pneumonia. Signed by Darrel Robles DO    XR pelvis 1-2 views    Result Date: 6/9/2024  STUDY: Pelvis Radiographs; 06/09/2024, 5:21 PM. INDICATION: Status post fall. COMPARISON: US pelvis: 11/08/22; CT AP: 10/18/22; XR hip/pelvis: 05/03/22. ACCESSION NUMBER(S): [id number] ORDERING CLINICIAN: HANNAH LOPEZ TECHNIQUE:  One view(s) of the pelvis. FINDINGS:  The pelvic ring is intact.  There is no acute fracture.  There are degenerative changes in the included lumbar spine.  The patient's left  hand superimposes the left  upper pelvis.  There has been remote ORIF of the proximal left femur with an intramedullary zofia and nail. Adjacent dystrophic calcifications were also present previously. Degenerative changes at the hips are again seen.  Calcified fibroid is noted in the pelvis.  There are vascular calcifications along the arterial tree.    No acute bony abnormalities on x-ray examination of the AP pelvis. Signed by Trina Roth DO    CT head wo IV contrast    Result Date: 6/9/2024  Interpreted By:  Betty Adams, STUDY: CT HEAD WO IV CONTRAST; CT CERVICAL SPINE WO IV CONTRAST;  6/9/2024 5:54 pm   INDICATION: Signs/Symptoms:Fall.   COMPARISON: 06/17/2022   ACCESSION NUMBER(S): [id number]; [id number]   ORDERING CLINICIAN: HANNAH LOPEZ   TECHNIQUE: Noncontrast axial CT scan of head was performed. Angled reformats in brain and bone windows were generated. The images were reviewed in bone, brain, blood and soft tissue windows.   Axial CT images of the cervical spine are obtained. Axial, coronal and sagittal reconstructions are provided for review.   FINDINGS: CT HEAD:   CSF Spaces:The ventricles, sulci and basal cisterns are normal in configuration and diffusely prominent, consistent with moderate to severe  volume loss. There is no extraaxial fluid collection.   Parenchyma: Periventricular, subcortical, and deep white matter areas of hypodensity are noted. Findings have progressed since the prior study. There is a regional area of encephalomalacia in the right parietal lobe which is new since the prior study and consistent with an infarct, does not have acute features although new since 2022. Left medial temporal lobe encephalomalacia is consistent with old infarct, progressed/evolved since prior, nonacute.  Associated ex vacuo dilation of the temporal horn of the left lateral ventricle is noted. Left thalamic lacunar infarcts are new since the prior study and also unlikely to be acute. There are atherosclerotic calcifications of the intracranial internal carotid and vertebral arteries. There is no mass effect or midline shift.  There is no intracranial hemorrhage.   Calvarium: The calvarium is unremarkable, no fracture.   Orbits: The visualized bony orbits are intact. There has been previous cataract surgery bilaterally. Bilateral scleral bands noted.   Paranasal Sinuses/Mastoids: Visualized paranasal sinuses and mastoids are clear.   Soft tissues: Unremarkable.   CT CERVICAL SPINE:   Fractures: There is no evidence for an acute fracture of the cervical spine.   Vertebral Alignment: Minimal anterolisthesis at C6-7 and retrolisthesis at C5-6. Normal facet alignment is maintained. There is a slight dextrocurvature of the lower cervical spine which may be due to slightly tilted head position.   Craniocervical Junction: The odontoid process and craniocervical junction are intact.   Vertebrae/Disc Spaces: The bones appear demineralized. No vertebral body height loss. Multilevel severe disc space narrowing and endplate osteophyte formation present, consistent with severe spondylosis. There are also multilevel facet osteophytes which are most pronounced in the mid to upper cervical spine, right greater than left. Ankylosis  noted of the right-sided facets at C3-4 level. Degenerative changes contribute to moderate canal stenosis at C5-6. Multilevel bony neural foraminal narrowing secondary to uncovertebral and facet osteophytes severe at bilateral C4-5 level, C5-6 level and C6-7 level.   Prevertebral/Paraspinal Soft Tissues: No prevertebral edema. Calcified granuloma right lung apex. Carotid atherosclerosis bilaterally.       1. No evidence of acute intracranial abnormality. 2. Moderate to severe generalized volume loss and progression of white matter hypodensities since 2022, consistent with advanced microvascular ischemic disease. There are areas of encephalomalacia consistent with infarcts, right parietal and left medial temporal lobes; right parietal infarct not obviously present previously but does not have acute features. The left temporal lobe infarct has evolved since prior. Left thalamic lacunar infarcts are new since prior without acute features. If concern for acute infarct, MRI is suggested. No hemorrhage. 3. No evidence of acute cervical spine fracture. 4. Multilevel severe cervical spondylosis, as above.       Signed by: Betty Adams 6/9/2024 7:02 PM Dictation workstation:   [id number]    CT cervical spine wo IV contrast    Result Date: 6/9/2024  Interpreted By:  Betty Adams, STUDY: CT HEAD WO IV CONTRAST; CT CERVICAL SPINE WO IV CONTRAST;  6/9/2024 5:54 pm   INDICATION: Signs/Symptoms:Fall.   COMPARISON: 06/17/2022   ACCESSION NUMBER(S): [id number]; [id number]   ORDERING CLINICIAN: HANNAH LOPEZ   TECHNIQUE: Noncontrast axial CT scan of head was performed. Angled reformats in brain and bone windows were generated. The images were reviewed in bone, brain, blood and soft tissue windows.   Axial CT images of the cervical spine are obtained. Axial, coronal and sagittal reconstructions are provided for review.   FINDINGS: CT HEAD:   CSF Spaces:The ventricles, sulci and basal cisterns are normal in configuration and diffusely  prominent, consistent with moderate to severe volume loss. There is no extraaxial fluid collection.   Parenchyma: Periventricular, subcortical, and deep white matter areas of hypodensity are noted. Findings have progressed since the prior study. There is a regional area of encephalomalacia in the right parietal lobe which is new since the prior study and consistent with an infarct, does not have acute features although new since 2022. Left medial temporal lobe encephalomalacia is consistent with old infarct, progressed/evolved since prior, nonacute.  Associated ex vacuo dilation of the temporal horn of the left lateral ventricle is noted. Left thalamic lacunar infarcts are new since the prior study and also unlikely to be acute. There are atherosclerotic calcifications of the intracranial internal carotid and vertebral arteries. There is no mass effect or midline shift.  There is no intracranial hemorrhage.   Calvarium: The calvarium is unremarkable, no fracture.   Orbits: The visualized bony orbits are intact. There has been previous cataract surgery bilaterally. Bilateral scleral bands noted.   Paranasal Sinuses/Mastoids: Visualized paranasal sinuses and mastoids are clear.   Soft tissues: Unremarkable.   CT CERVICAL SPINE:   Fractures: There is no evidence for an acute fracture of the cervical spine.   Vertebral Alignment: Minimal anterolisthesis at C6-7 and retrolisthesis at C5-6. Normal facet alignment is maintained. There is a slight dextrocurvature of the lower cervical spine which may be due to slightly tilted head position.   Craniocervical Junction: The odontoid process and craniocervical junction are intact.   Vertebrae/Disc Spaces: The bones appear demineralized. No vertebral body height loss. Multilevel severe disc space narrowing and endplate osteophyte formation present, consistent with severe spondylosis. There are also multilevel facet osteophytes which are most pronounced in the mid to upper  cervical spine, right greater than left. Ankylosis noted of the right-sided facets at C3-4 level. Degenerative changes contribute to moderate canal stenosis at C5-6. Multilevel bony neural foraminal narrowing secondary to uncovertebral and facet osteophytes severe at bilateral C4-5 level, C5-6 level and C6-7 level.   Prevertebral/Paraspinal Soft Tissues: No prevertebral edema. Calcified granuloma right lung apex. Carotid atherosclerosis bilaterally.       1. No evidence of acute intracranial abnormality. 2. Moderate to severe generalized volume loss and progression of white matter hypodensities since 2022, consistent with advanced microvascular ischemic disease. There are areas of encephalomalacia consistent with infarcts, right parietal and left medial temporal lobes; right parietal infarct not obviously present previously but does not have acute features. The left temporal lobe infarct has evolved since prior. Left thalamic lacunar infarcts are new since prior without acute features. If concern for acute infarct, MRI is suggested. No hemorrhage. 3. No evidence of acute cervical spine fracture. 4. Multilevel severe cervical spondylosis, as above.       Signed by: Betty Adams 6/9/2024 7:02 PM Dictation workstation:   [id number]        Assessment/Plan   Community acquired pneumonia  - Ceftriaxone and azithromycin  - Robitussin if she develops a cough, currently no cough  - oxygen as needed   - ID consult    Left shoulder pain  - Xray showed no  acute bony abnormality/fracture  - pt unable to move shoulder, will order CT shoulder to r/o occult fracture    Uncertain if hit head in fall  - CT head shows no hemorrhage  - pt on Eliquis  - Neuro checks q4 hrs, if sig change, repeat CT head    Seizure disorder  - cont Keppra    Hx of AV block s/p pacemaker  - cont tele  - ECG - paced rhythm    HTN  - continue amlodipine    HLD  - Statin    Hx of afib  - Eliquis         I spent 65 minutes in the professional and overall care of  this patient.      Ellie Zarco MD

## 2024-06-10 NOTE — CONSULTS
INITIAL NEUROLOGY CONSULT NOTE    IMPRESSION:  Unwitnessed fall.  She has no focal neurological findings on exam to suggest myopathy, neuropathy, radiculopathy, plexopathy, myelopathy, or intracranial pathology that would have contributed to the fall.  History of dementia.    RECOMMENDATIONS:  No acute neurological intervention required at this time.  I am happy to see the patient again as needed or requested.    Cornelio Diaz Jr., M.D., FAAN       History Of Present Illness  Marian Fritz is a 89 y.o. female presenting with fall.  History comes from the patient's daughter, who is in the room at the time of my visit with the son-in-law and granddaughter.  I also reviewed the available EMR.  The patient can provide only limited history because of antecedent dementia.    She had an unwitnessed fall two days ago.  She was found on the floor, conscious.  [place] [facility] [place]   She was found to have a left humeral neck fracture and is being followed by Ortho, with conservative treatment.  Family requested neurological evaluation to rule out neurological causes for fall.  The patient currently denies other focal neurological symptoms including dysarthria, dysphagia, diplopia, focal weakness, focal sensory change, ataxia, vertigo, or bowel/bladder incontinence, among others.    Past Medical History  Past Medical History:   Diagnosis Date    Personal history of other diseases of the circulatory system     History of hypertension    Personal history of other diseases of the nervous system and sense organs 12/16/2013    History of complex partial epilepsy    Pure hypercholesterolemia, unspecified     High cholesterol    Unspecified cataract     Cataracts, both eyes    Unspecified convulsions (Multi)     Seizures     Surgical History  Past Surgical History:   Procedure Laterality Date    CATARACT EXTRACTION  12/01/2014    Cataract Surgery    CT ANGIO NECK  9/16/2018    CT NECK ANGIO W AND WO IV  CONTRAST 9/16/2018 [facility] EMERGENCY LEGACY    CT HEAD ANGIO W AND WO IV CONTRAST  9/16/2018    CT HEAD ANGIO W AND WO IV CONTRAST 9/16/2018 [facility] EMERGENCY LEGACY    MR HEAD ANGIO WO IV CONTRAST  11/28/2018    MR HEAD ANGIO WO IV CONTRAST 11/28/2018 [facility] INPATIENT LEGAC[facility]    MR NECK ANGIO WO IV CONTRAST  11/28/2018    MR NECK ANGIO WO IV CONTRAST 11/28/2018 [facility] INPATIENT LEGACY    OTHER SURGICAL HISTORY  12/01/2014    Simple Mastectomy Right Breast     Social History  Social History     Tobacco Use    Smoking status: Never    Smokeless tobacco: Never   Vaping Use    Vaping status: Never Used   Substance Use Topics    Alcohol use: Never    Drug use: Never       Scheduled medications  apixaban, 2.5 mg, oral, BID  atorvastatin, 40 mg, oral, Daily  azithromycin, 500 mg, intravenous, q24h  carvedilol, 6.25 mg, oral, BID  cefTRIAXone, 1 g, intravenous, q24h  levETIRAcetam, 750 mg, oral, BID  [Held by provider] losartan, 50 mg, oral, Daily  melatonin, 3 mg, oral, Nightly  pantoprazole, 40 mg, oral, Daily before breakfast   Or  pantoprazole, 40 mg, intravenous, Daily before breakfast      Continuous medications     PRN medications  PRN medications: acetaminophen, acetaminophen, hydrALAZINE **FOLLOWED BY** [START ON 6/12/2024] hydrALAZINE, ondansetron ODT **OR** ondansetron, oxygen      Family History   Problem Relation Name Age of Onset    Heart failure Mother      Tracheoesophageal fistual Father      Pancreatic cancer Sister      Coronary artery disease Brother      Pancreatic cancer Brother       Allergies  Bee venom protein (honey bee)  Medications Prior to Admission   Medication Sig Dispense Refill Last Dose    apixaban (Eliquis) 2.5 mg tablet Take 1 tablet (2.5 mg) by mouth 2 times a day. 180 tablet 3 6/9/2024    atorvastatin (Lipitor) 40 mg tablet Take 1 tablet (40 mg) by mouth once daily. 90 tablet 3 6/8/2024    biotin 5 mg capsule Take by mouth.   6/9/2024    carvedilol (Coreg) 6.25 mg tablet take one tablet every morning  "and evening. 180 tablet 3 6/9/2024    levETIRAcetam (Keppra) 750 mg tablet Take 1 tablet (750 mg) by mouth 2 times a day. 180 tablet 3 6/9/2024    pantoprazole (ProtoNix) 20 mg EC tablet Take 1 tablet (20 mg) by mouth once daily. 90 tablet 3 6/9/2024    vitamins A,C,E-zinc-copper (PreserVision AREDS) 4,296 mcg-226 mg-90 mg capsule Take 1 capsule by mouth twice a day.   6/9/2024    amLODIPine (Norvasc) 5 mg tablet Take 1 tablet (5 mg) by mouth once daily.       cholecalciferol (Vitamin D-3) 25 MCG (1000 UT) tablet Take 1 tablet (25 mcg) by mouth once daily.       furosemide (Lasix) 20 mg tablet Take 0.5-1 tablets (10-20 mg) by mouth once daily. 45 tablet 2     losartan (Cozaar) 50 mg tablet Take 1 tablet (50 mg) by mouth once daily. (Patient not taking: Reported on 6/9/2024) 90 tablet 3 More than a month    potassium chloride CR 10 mEq ER tablet Take 1 tablet (10 mEq) by mouth every other day. 45 tablet 3        Scheduled medications  apixaban, 2.5 mg, oral, BID  atorvastatin, 40 mg, oral, Daily  azithromycin, 500 mg, intravenous, q24h  carvedilol, 6.25 mg, oral, BID  cefTRIAXone, 1 g, intravenous, q24h  levETIRAcetam, 750 mg, oral, BID  [Held by provider] losartan, 50 mg, oral, Daily  melatonin, 3 mg, oral, Nightly  pantoprazole, 40 mg, oral, Daily before breakfast   Or  pantoprazole, 40 mg, intravenous, Daily before breakfast      Continuous medications     PRN medications  PRN medications: acetaminophen, acetaminophen, hydrALAZINE **FOLLOWED BY** [START ON 6/12/2024] hydrALAZINE, ondansetron ODT **OR** ondansetron, oxygen    Review of Systems   All other systems reviewed and are negative.      Last Recorded Vitals  Blood pressure 163/83, pulse 72, temperature 36.8 °C (98.3 °F), temperature source Temporal, resp. rate 20, height 1.473 m (4' 9.99\"), weight 47.6 kg (104 lb 15 oz), SpO2 99%.    CONSTITUTIONAL:  No acute distress    CARDIOVASCULAR:  Normal pulses in the distal legs, no edema of either arm or either " leg.  No carotid bruits.    MENTAL STATUS:  Awake, alert, oriented to self, but not to place or time, with poor short-term memory (0/3 5-minute recall), poor awareness of recent events, normal attention span, concentration, and reduced fund of knowledge.    SPEECH AND LANGUAGE:  Can name and repeat, follows all commands, has no dysarthria    FUNDOSCOPIC:  No papilledema    CRANIAL NERVES:  II-Vision present, visual fields full to confrontational testing    III/IV/VI--EOMs are present in all directions.  Pupils are symmetrically reactive in dim light.  No ptosis.    V--Normal facial sensation.    VII--No facial asymmetry.    VIII--Hearing present to voice bilaterally.    IX/X--Symmetric soft palate rise.    XI--Normal trapezius power bilaterally.    XII--Tongue protrudes without deviation.    MOTOR:  Normal power, tone, and bulk in both arms and both legs.    SENSORY:  Normal pin sensation in both arms and both legs without distal-proximal gradient, asymmetry, or spinal sensory level.    COORDINATION:  Normal finger-to-nose and heel-to-shin testing in both arms and both legs.    REFLEXES are normal and symmetric at the biceps, triceps, brachioradialis, patella, and ankle.  The plantar responses are flexor.    GAIT deferred    Relevant Results  Results for orders placed or performed during the hospital encounter of 06/09/24 (from the past 24 hour(s))   Blood Culture    Specimen: Peripheral Venipuncture; Blood culture   Result Value Ref Range    Blood Culture Loaded on Instrument - Culture in progress    Blood Culture    Specimen: Peripheral Venipuncture; Blood culture   Result Value Ref Range    Blood Culture Loaded on Instrument - Culture in progress    Urinalysis with Reflex Culture and Microscopic   Result Value Ref Range    Color, Urine Light-Yellow Light-Yellow, Yellow, Dark-Yellow    Appearance, Urine Clear Clear    Specific Gravity, Urine 1.014 1.005 - 1.035    pH, Urine 5.0 5.0, 5.5, 6.0, 6.5, 7.0, 7.5, 8.0     Protein, Urine NEGATIVE NEGATIVE, 10 (TRACE), 20 (TRACE) mg/dL    Glucose, Urine Normal Normal mg/dL    Blood, Urine NEGATIVE NEGATIVE    Ketones, Urine NEGATIVE NEGATIVE mg/dL    Bilirubin, Urine NEGATIVE NEGATIVE    Urobilinogen, Urine Normal Normal mg/dL    Nitrite, Urine NEGATIVE NEGATIVE    Leukocyte Esterase, Urine NEGATIVE NEGATIVE   Extra Urine Gray Tube   Result Value Ref Range    Extra Tube Hold for add-ons.    CBC and Auto Differential   Result Value Ref Range    WBC 11.4 (H) 4.4 - 11.3 x10*3/uL    nRBC 0.0 0.0 - 0.0 /100 WBCs    RBC 3.94 (L) 4.00 - 5.20 x10*6/uL    Hemoglobin 12.9 12.0 - 16.0 g/dL    Hematocrit 37.9 36.0 - 46.0 %    MCV 96 80 - 100 fL    MCH 32.7 26.0 - 34.0 pg    MCHC 34.0 32.0 - 36.0 g/dL    RDW 12.5 11.5 - 14.5 %    Platelets 188 150 - 450 x10*3/uL    Neutrophils % 76.6 40.0 - 80.0 %    Immature Granulocytes %, Automated 1.0 (H) 0.0 - 0.9 %    Lymphocytes % 12.0 13.0 - 44.0 %    Monocytes % 7.5 2.0 - 10.0 %    Eosinophils % 2.7 0.0 - 6.0 %    Basophils % 0.2 0.0 - 2.0 %    Neutrophils Absolute 8.73 (H) 1.60 - 5.50 x10*3/uL    Immature Granulocytes Absolute, Automated 0.11 0.00 - 0.50 x10*3/uL    Lymphocytes Absolute 1.37 0.80 - 3.00 x10*3/uL    Monocytes Absolute 0.85 (H) 0.05 - 0.80 x10*3/uL    Eosinophils Absolute 0.31 0.00 - 0.40 x10*3/uL    Basophils Absolute 0.02 0.00 - 0.10 x10*3/uL   Comprehensive metabolic panel   Result Value Ref Range    Glucose 106 (H) 74 - 99 mg/dL    Sodium 140 136 - 145 mmol/L    Potassium 3.7 3.5 - 5.3 mmol/L    Chloride 105 98 - 107 mmol/L    Bicarbonate 27 21 - 32 mmol/L    Anion Gap 12 10 - 20 mmol/L    Urea Nitrogen 32 (H) 6 - 23 mg/dL    Creatinine 0.89 0.50 - 1.05 mg/dL    eGFR 59 (L) >60 mL/min/1.73m*2    Calcium 7.9 (L) 8.6 - 10.3 mg/dL    Albumin 2.9 (L) 3.4 - 5.0 g/dL    Alkaline Phosphatase 56 33 - 136 U/L    Total Protein 5.5 (L) 6.4 - 8.2 g/dL    AST 14 9 - 39 U/L    Bilirubin, Total 0.8 0.0 - 1.2 mg/dL    ALT 23 7 - 45 U/L   B-Type  Natriuretic Peptide   Result Value Ref Range     (H) 0 - 99 pg/mL         I have personally reviewed the following imaging results:  CT head wo IV contrast    Result Date: 6/9/2024  Interpreted By:  Betty Adams, STUDY: CT HEAD WO IV CONTRAST; CT CERVICAL SPINE WO IV CONTRAST;  6/9/2024 5:54 pm   INDICATION: Signs/Symptoms:Fall.   COMPARISON: 06/17/2022   ACCESSION NUMBER(S): [id number]; [id number]   ORDERING CLINICIAN: HANNAH LOPEZ   TECHNIQUE: Noncontrast axial CT scan of head was performed. Angled reformats in brain and bone windows were generated. The images were reviewed in bone, brain, blood and soft tissue windows.   Axial CT images of the cervical spine are obtained. Axial, coronal and sagittal reconstructions are provided for review.   FINDINGS: CT HEAD:   CSF Spaces:The ventricles, sulci and basal cisterns are normal in configuration and diffusely prominent, consistent with moderate to severe volume loss. There is no extraaxial fluid collection.   Parenchyma: Periventricular, subcortical, and deep white matter areas of hypodensity are noted. Findings have progressed since the prior study. There is a regional area of encephalomalacia in the right parietal lobe which is new since the prior study and consistent with an infarct, does not have acute features although new since 2022. Left medial temporal lobe encephalomalacia is consistent with old infarct, progressed/evolved since prior, nonacute.  Associated ex vacuo dilation of the temporal horn of the left lateral ventricle is noted. Left thalamic lacunar infarcts are new since the prior study and also unlikely to be acute. There are atherosclerotic calcifications of the intracranial internal carotid and vertebral arteries. There is no mass effect or midline shift.  There is no intracranial hemorrhage.   Calvarium: The calvarium is unremarkable, no fracture.   Orbits: The visualized bony orbits are intact. There has been previous cataract surgery  bilaterally. Bilateral scleral bands noted.   Paranasal Sinuses/Mastoids: Visualized paranasal sinuses and mastoids are clear.   Soft tissues: Unremarkable.   CT CERVICAL SPINE:   Fractures: There is no evidence for an acute fracture of the cervical spine.   Vertebral Alignment: Minimal anterolisthesis at C6-7 and retrolisthesis at C5-6. Normal facet alignment is maintained. There is a slight dextrocurvature of the lower cervical spine which may be due to slightly tilted head position.   Craniocervical Junction: The odontoid process and craniocervical junction are intact.   Vertebrae/Disc Spaces: The bones appear demineralized. No vertebral body height loss. Multilevel severe disc space narrowing and endplate osteophyte formation present, consistent with severe spondylosis. There are also multilevel facet osteophytes which are most pronounced in the mid to upper cervical spine, right greater than left. Ankylosis noted of the right-sided facets at C3-4 level. Degenerative changes contribute to moderate canal stenosis at C5-6. Multilevel bony neural foraminal narrowing secondary to uncovertebral and facet osteophytes severe at bilateral C4-5 level, C5-6 level and C6-7 level.   Prevertebral/Paraspinal Soft Tissues: No prevertebral edema. Calcified granuloma right lung apex. Carotid atherosclerosis bilaterally.       1. No evidence of acute intracranial abnormality. 2. Moderate to severe generalized volume loss and progression of white matter hypodensities since 2022, consistent with advanced microvascular ischemic disease. There are areas of encephalomalacia consistent with infarcts, right parietal and left medial temporal lobes; right parietal infarct not obviously present previously but does not have acute features. The left temporal lobe infarct has evolved since prior. Left thalamic lacunar infarcts are new since prior without acute features. If concern for acute infarct, MRI is suggested. No hemorrhage. 3. No  evidence of acute cervical spine fracture. 4. Multilevel severe cervical spondylosis, as above.       Signed by: Betty Adams 6/9/2024 7:02 PM Dictation workstation:   [id number]    No MRI head results found for the past 14 days   No results found for this or any previous visit.          Cornelio Diaz Jr., M.D., FAAN

## 2024-06-10 NOTE — CONSULTS
Inpatient consult to Orthopaedic Surgery  Consult performed by: Kevin Gtz MD  Consult ordered by: Claudette Nguyễn, APRN-CNP  Reason for consult: left shoulder pain        History Of Present Illness  Marian Fritz is a 89 y.o. female presenting with PMH of seizure, TIA, HFpEF (echo 2022 LVEF 70-75%), AV block s/p pacemaker, GERD, HTN, HLD, DVT presenting with fall on 6/9/24. XR showing no acute abnormalities.CT was ordered today due to continued pain, CT showed Acute oblique fracture through left humeral neck (predominantly extending through anatomic neck of the humerus) superimposed on remote fracture union of proximal humeral metadiaphysis. Patient denies any other orthopedic injuries     Past Medical History  She has a past medical history of Personal history of other diseases of the circulatory system, Personal history of other diseases of the nervous system and sense organs (12/16/2013), Pure hypercholesterolemia, unspecified, Unspecified cataract, and Unspecified convulsions (Multi).    Surgical History  She has a past surgical history that includes Cataract extraction (12/01/2014); Other surgical history (12/01/2014); CT angio head w and wo IV contrast (9/16/2018); CT angio neck (9/16/2018); MR angio head wo IV contrast (11/28/2018); and MR angio neck wo IV contrast (11/28/2018).     Social History  She reports that she has never smoked. She has never used smokeless tobacco. She reports that she does not drink alcohol and does not use drugs.    Family History  Family History   Problem Relation Name Age of Onset    Heart failure Mother      Tracheoesophageal fistual Father      Pancreatic cancer Sister      Coronary artery disease Brother      Pancreatic cancer Brother          Allergies  Bee venom protein (honey bee)    Review of Systems   Constitutional:  Negative for chills and fever.   Respiratory:  Negative for shortness of breath.    Cardiovascular:  Negative for chest pain.  "  Gastrointestinal:  Negative for abdominal pain.   Endocrine: Negative for cold intolerance and heat intolerance.   Musculoskeletal:  Positive for joint swelling.   Neurological:  Negative for dizziness and light-headedness.   Psychiatric/Behavioral:  Negative for agitation.         Physical Exam  Pulmonary:      Effort: Pulmonary effort is normal.   Abdominal:      Palpations: Abdomen is soft.   Musculoskeletal:      Comments: Left upper extremity:   -Skin intact with ecchymosis  -Tender at site of injury with painful ROM.  -Fires axillary/AIN/PIN/ulnar distributions  -SILT axillary/radial/median/ulnar distributions  -Hand warm, well perfused  -Palpable radial pulse, cap refill brisk  -Compartments soft and compressible    Skin:     General: Skin is warm.      Capillary Refill: Capillary refill takes less than 2 seconds.   Neurological:      Mental Status: She is alert. Mental status is at baseline.   Psychiatric:         Mood and Affect: Mood normal.          Last Recorded Vitals  Blood pressure 118/68, pulse 72, temperature 36.7 °C (98 °F), temperature source Temporal, resp. rate 20, height 1.473 m (4' 9.99\"), weight 47.6 kg (104 lb 15 oz), SpO2 99%.    Relevant Results    CT shoulder left wo IV contrast    Result Date: 6/10/2024  Interpreted By:  Sheridan Trent, STUDY: CT SHOULDER LEFT WO IV CONTRAST   INDICATION: Signs/Symptoms:Fell, has left shoulder pain, no fx seen on xray. R/O occult fracture   COMPARISON: Left shoulder radiographs dated 06/09/2024   ACCESSION NUMBER(S): [id number]   ORDERING CLINICIAN: TAYE GIORDANO   TECHNIQUE: Contiguous axial CT images were obtained at  2 mm slice thickness through the left shoulder without intravenous contrast administration. Coronal and sagittal reformatted images were performed. 3D reformatted images were created and reviewed.   FINDINGS: OSSEOUS STRUCTURES: There is cortical remodeling of the proximal humeral metadiaphysis, likely related to remote fracture. " There is superimposed acute oblique fracture lucency extending from the lateral cortex of proximal humeral head below the level of greater tuberosity (as seen on coronal image 66/131) the medial cortex below the level of humeral head articular surface (best seen on coronal image 70/131). There is mild external rotation of the distal fracture fragment with overriding of fracture fragments along the posterior cortex of humeral metaphysis measuring up to 5 mm. There is associated anterior angulation of the fracture apex. There is no definite evidence of extension of fracture lucency to the greater tuberosity. No definite evidence of extension of fracture lucency to the humeral head articular surface. There is moderate volume glenohumeral joint effusion. There are linearly oriented calcifications along the superior aspect of greater tuberosity at the expected location of the distal rotator cuff tendons. This could be related to hydroxyapatite deposition versus dystrophic calcifications as a sequela of chronic synovitis. There are mild-to-moderate degenerative changes in the glenohumeral joint with joint space narrowing and small marginal osteophytes. Linearly oriented calcifications along the expected region of the glenoid labrum and joint capsule is likely favored to be related to chronic synovitis.   The acromioclavicular joint articulation is maintained and demonstrates moderate degenerative changes with joint space narrowing and marginal osteophytes. There is mild cortical remodeling along the lateral aspect of left 3rd through 5th ribs, suggestive of age-indeterminate nondisplaced fractures.   SOFT TISSUES: Evaluation is limited by the lack of intravenous contrast. Within this limitation, there is moderate volume left glenohumeral joint effusion. There is suggestion of mild fatty atrophy of the supraspinatus muscle. Otherwise rest of the soft tissues appear grossly unremarkable. No evidence of fluid collections or  mass lesions within limits of noncontrast technique. Left chest wall pacemaker is partially included in the field of view. Partially visualized left lung demonstrates subpleural reticulation with coarse interstitial septal thickening, likely favored to be related to chronic lung parenchymal changes.       1. Acute oblique fracture through left humeral neck (predominantly extending through anatomic neck of the humerus) superimposed on remote fracture union of proximal humeral metadiaphysis as described above. There is impaction of fracture fragments with anterior angulation of fracture apex as described above. 2. Moderate volume glenohumeral joint effusion, likely favored to be reactive. Linearly oriented calcifications in the expected region of distal rotator cuff tendon could be related to hydroxyapatite deposition versus sequela of chronic synovitis with associated dystrophic calcifications. 3. Mild-to-moderate glenohumeral and moderate to severe acromioclavicular joint arthrosis. 4. Findings concerning for age-indeterminate nondisplaced fractures through lateral aspect of left 3rd through 5th ribs. Recommend correlation with point tenderness in this location.     MACRO: Critical Finding:  See findings. Notification was initiated on 6/10/2024 at 11:23 am by Dr. Sheridan Trent.  (**-YCF-**)   Signed by: Sheridan Trent 6/10/2024 11:23 AM Dictation workstation:   [id number]    ECG 12 lead    Result Date: 6/10/2024  AV dual-paced rhythm Abnormal ECG When compared with ECG of 26-APR-2024 13:13, Vent. rate has decreased BY  10 BPM    XR shoulder left 2+ views    Result Date: 6/9/2024  STUDY: Shoulder Radiographs; 06/09/2024, 5:21 PM. INDICATION: Status post fall. Left shoulder pain. COMPARISON: CXR: 10/06/22. ACCESSION NUMBER(S): [id number] ORDERING CLINICIAN: HANNAH LOPEZ TECHNIQUE:  Two view(s) of the left shoulder. FINDINGS:  Humeral head is intact.  There is deformity of the proximal humerus which appears  chronic.  Glenohumeral joint alignment is maintained with mild arthrosis.  There is calcific tendinopathy of the supraspinatus tendon.  There is moderate arthrosis of the acromioclavicular joint.  There is left subclavian pacer device.      Chronic deformity of the proximal humerus without acute fracture or malalignment.  Degenerative changes. Signed by Darrel Robles DO    XR shoulder right 2+ views    Result Date: 6/9/2024  STUDY: Shoulder Radiographs; 6/9/2024 17:21 INDICATION: Fall. COMPARISON: None Available. ACCESSION NUMBER(S): [id number] ORDERING CLINICIAN: HANNAH LOPEZ TECHNIQUE:  Two view(s) of the right shoulder. FINDINGS:  There is no displaced fracture.  There is moderate arthrosis of the glenohumeral joint and acromioclavicular joint.  There is narrowing of the subacromial space.  There is calcific tendinopathy of the supraspinatus.  There are surgical clips in the right axilla.    Degenerative changes without acute fracture or malalignment. Narrowing of subacromial space suggesting rotator cuff pathology. Calcific tendinopathy of the supraspinatus tendon. Signed by Darrel Robles DO    XR chest 1 view    Result Date: 6/9/2024  STUDY: Chest Radiograph;  06/09/2024, 5:21 PM. INDICATION: Status post fall. Generalized weakness. COMPARISON: CT AP: 10/18/22; CXR: 10/06/22; CT chest: 06/08/22. ACCESSION NUMBER(S): [id number] ORDERING CLINICIAN: HANNAH LOPEZ TECHNIQUE:  Frontal chest was obtained at 17:21 hours. FINDINGS: CARDIOMEDIASTINAL SILHOUETTE: Heart size is mildly enlarged.  Left subclavian pacer device.  LUNGS: Lungs are hyperinflated.  Multilobar airspace opacities are demonstrated.  There is no pneumothorax or pleural effusion.  ABDOMEN: No remarkable upper abdominal findings.  BONES: No acute osseous changes.  There are surgical clips right axilla. There is a chronic-appearing deformity of the left proximal humerus.    Multilobar subtle patchy airspace opacities.  Please  correlate for pneumonia. Signed by Darrel Robles DO    XR pelvis 1-2 views    Result Date: 6/9/2024  STUDY: Pelvis Radiographs; 06/09/2024, 5:21 PM. INDICATION: Status post fall. COMPARISON: US pelvis: 11/08/22; CT AP: 10/18/22; XR hip/pelvis: 05/03/22. ACCESSION NUMBER(S): [id number] ORDERING CLINICIAN: HANNAH LOPEZ TECHNIQUE:  One view(s) of the pelvis. FINDINGS:  The pelvic ring is intact.  There is no acute fracture.  There are degenerative changes in the included lumbar spine.  The patient's left  hand superimposes the left  upper pelvis.  There has been remote ORIF of the proximal left femur with an intramedullary zofia and nail. Adjacent dystrophic calcifications were also present previously. Degenerative changes at the hips are again seen.  Calcified fibroid is noted in the pelvis.  There are vascular calcifications along the arterial tree.    No acute bony abnormalities on x-ray examination of the AP pelvis. Signed by Trina Roth DO    CT head wo IV contrast    Result Date: 6/9/2024  Interpreted By:  Betty Adams, STUDY: CT HEAD WO IV CONTRAST; CT CERVICAL SPINE WO IV CONTRAST;  6/9/2024 5:54 pm   INDICATION: Signs/Symptoms:Fall.   COMPARISON: 06/17/2022   ACCESSION NUMBER(S): [id number]; [id number]   ORDERING CLINICIAN: HANNAH LOPEZ   TECHNIQUE: Noncontrast axial CT scan of head was performed. Angled reformats in brain and bone windows were generated. The images were reviewed in bone, brain, blood and soft tissue windows.   Axial CT images of the cervical spine are obtained. Axial, coronal and sagittal reconstructions are provided for review.   FINDINGS: CT HEAD:   CSF Spaces:The ventricles, sulci and basal cisterns are normal in configuration and diffusely prominent, consistent with moderate to severe volume loss. There is no extraaxial fluid collection.   Parenchyma: Periventricular, subcortical, and deep white matter areas of hypodensity are noted. Findings have progressed since the prior  study. There is a regional area of encephalomalacia in the right parietal lobe which is new since the prior study and consistent with an infarct, does not have acute features although new since 2022. Left medial temporal lobe encephalomalacia is consistent with old infarct, progressed/evolved since prior, nonacute.  Associated ex vacuo dilation of the temporal horn of the left lateral ventricle is noted. Left thalamic lacunar infarcts are new since the prior study and also unlikely to be acute. There are atherosclerotic calcifications of the intracranial internal carotid and vertebral arteries. There is no mass effect or midline shift.  There is no intracranial hemorrhage.   Calvarium: The calvarium is unremarkable, no fracture.   Orbits: The visualized bony orbits are intact. There has been previous cataract surgery bilaterally. Bilateral scleral bands noted.   Paranasal Sinuses/Mastoids: Visualized paranasal sinuses and mastoids are clear.   Soft tissues: Unremarkable.   CT CERVICAL SPINE:   Fractures: There is no evidence for an acute fracture of the cervical spine.   Vertebral Alignment: Minimal anterolisthesis at C6-7 and retrolisthesis at C5-6. Normal facet alignment is maintained. There is a slight dextrocurvature of the lower cervical spine which may be due to slightly tilted head position.   Craniocervical Junction: The odontoid process and craniocervical junction are intact.   Vertebrae/Disc Spaces: The bones appear demineralized. No vertebral body height loss. Multilevel severe disc space narrowing and endplate osteophyte formation present, consistent with severe spondylosis. There are also multilevel facet osteophytes which are most pronounced in the mid to upper cervical spine, right greater than left. Ankylosis noted of the right-sided facets at C3-4 level. Degenerative changes contribute to moderate canal stenosis at C5-6. Multilevel bony neural foraminal narrowing secondary to uncovertebral and facet  osteophytes severe at bilateral C4-5 level, C5-6 level and C6-7 level.   Prevertebral/Paraspinal Soft Tissues: No prevertebral edema. Calcified granuloma right lung apex. Carotid atherosclerosis bilaterally.       1. No evidence of acute intracranial abnormality. 2. Moderate to severe generalized volume loss and progression of white matter hypodensities since 2022, consistent with advanced microvascular ischemic disease. There are areas of encephalomalacia consistent with infarcts, right parietal and left medial temporal lobes; right parietal infarct not obviously present previously but does not have acute features. The left temporal lobe infarct has evolved since prior. Left thalamic lacunar infarcts are new since prior without acute features. If concern for acute infarct, MRI is suggested. No hemorrhage. 3. No evidence of acute cervical spine fracture. 4. Multilevel severe cervical spondylosis, as above.       Signed by: Betty Adams 6/9/2024 7:02 PM Dictation workstation:   [id number]    CT cervical spine wo IV contrast    Result Date: 6/9/2024  Interpreted By:  Betty Adams, STUDY: CT HEAD WO IV CONTRAST; CT CERVICAL SPINE WO IV CONTRAST;  6/9/2024 5:54 pm   INDICATION: Signs/Symptoms:Fall.   COMPARISON: 06/17/2022   ACCESSION NUMBER(S): [id number]; [id number]   ORDERING CLINICIAN: HANNAH LOPEZ   TECHNIQUE: Noncontrast axial CT scan of head was performed. Angled reformats in brain and bone windows were generated. The images were reviewed in bone, brain, blood and soft tissue windows.   Axial CT images of the cervical spine are obtained. Axial, coronal and sagittal reconstructions are provided for review.   FINDINGS: CT HEAD:   CSF Spaces:The ventricles, sulci and basal cisterns are normal in configuration and diffusely prominent, consistent with moderate to severe volume loss. There is no extraaxial fluid collection.   Parenchyma: Periventricular, subcortical, and deep white matter areas of hypodensity are  noted. Findings have progressed since the prior study. There is a regional area of encephalomalacia in the right parietal lobe which is new since the prior study and consistent with an infarct, does not have acute features although new since 2022. Left medial temporal lobe encephalomalacia is consistent with old infarct, progressed/evolved since prior, nonacute.  Associated ex vacuo dilation of the temporal horn of the left lateral ventricle is noted. Left thalamic lacunar infarcts are new since the prior study and also unlikely to be acute. There are atherosclerotic calcifications of the intracranial internal carotid and vertebral arteries. There is no mass effect or midline shift.  There is no intracranial hemorrhage.   Calvarium: The calvarium is unremarkable, no fracture.   Orbits: The visualized bony orbits are intact. There has been previous cataract surgery bilaterally. Bilateral scleral bands noted.   Paranasal Sinuses/Mastoids: Visualized paranasal sinuses and mastoids are clear.   Soft tissues: Unremarkable.   CT CERVICAL SPINE:   Fractures: There is no evidence for an acute fracture of the cervical spine.   Vertebral Alignment: Minimal anterolisthesis at C6-7 and retrolisthesis at C5-6. Normal facet alignment is maintained. There is a slight dextrocurvature of the lower cervical spine which may be due to slightly tilted head position.   Craniocervical Junction: The odontoid process and craniocervical junction are intact.   Vertebrae/Disc Spaces: The bones appear demineralized. No vertebral body height loss. Multilevel severe disc space narrowing and endplate osteophyte formation present, consistent with severe spondylosis. There are also multilevel facet osteophytes which are most pronounced in the mid to upper cervical spine, right greater than left. Ankylosis noted of the right-sided facets at C3-4 level. Degenerative changes contribute to moderate canal stenosis at C5-6. Multilevel bony neural  foraminal narrowing secondary to uncovertebral and facet osteophytes severe at bilateral C4-5 level, C5-6 level and C6-7 level.   Prevertebral/Paraspinal Soft Tissues: No prevertebral edema. Calcified granuloma right lung apex. Carotid atherosclerosis bilaterally.       1. No evidence of acute intracranial abnormality. 2. Moderate to severe generalized volume loss and progression of white matter hypodensities since 2022, consistent with advanced microvascular ischemic disease. There are areas of encephalomalacia consistent with infarcts, right parietal and left medial temporal lobes; right parietal infarct not obviously present previously but does not have acute features. The left temporal lobe infarct has evolved since prior. Left thalamic lacunar infarcts are new since prior without acute features. If concern for acute infarct, MRI is suggested. No hemorrhage. 3. No evidence of acute cervical spine fracture. 4. Multilevel severe cervical spondylosis, as above.       Signed by: Betty Adams 6/9/2024 7:02 PM Dictation workstation:   [id number]     Scheduled medications  apixaban, 2.5 mg, oral, BID  atorvastatin, 40 mg, oral, Daily  azithromycin, 500 mg, intravenous, q24h  carvedilol, 6.25 mg, oral, BID  cefTRIAXone, 1 g, intravenous, q24h  levETIRAcetam, 750 mg, oral, BID  [Held by provider] losartan, 50 mg, oral, Daily  melatonin, 3 mg, oral, Nightly  pantoprazole, 40 mg, oral, Daily before breakfast   Or  pantoprazole, 40 mg, intravenous, Daily before breakfast      Continuous medications     PRN medications  PRN medications: acetaminophen, acetaminophen, hydrALAZINE **FOLLOWED BY** [START ON 6/12/2024] hydrALAZINE, ondansetron ODT **OR** ondansetron, oxygen  Results for orders placed or performed during the hospital encounter of 06/09/24 (from the past 24 hour(s))   CBC and Auto Differential   Result Value Ref Range    WBC 17.1 (H) 4.4 - 11.3 x10*3/uL    nRBC 0.0 0.0 - 0.0 /100 WBCs    RBC 4.27 4.00 - 5.20 x10*6/uL     Hemoglobin 14.1 12.0 - 16.0 g/dL    Hematocrit 41.4 36.0 - 46.0 %    MCV 97 80 - 100 fL    MCH 33.0 26.0 - 34.0 pg    MCHC 34.1 32.0 - 36.0 g/dL    RDW 12.6 11.5 - 14.5 %    Platelets 218 150 - 450 x10*3/uL    Neutrophils % 84.6 40.0 - 80.0 %    Immature Granulocytes %, Automated 0.8 0.0 - 0.9 %    Lymphocytes % 5.2 13.0 - 44.0 %    Monocytes % 9.0 2.0 - 10.0 %    Eosinophils % 0.2 0.0 - 6.0 %    Basophils % 0.2 0.0 - 2.0 %    Neutrophils Absolute 14.49 (H) 1.60 - 5.50 x10*3/uL    Immature Granulocytes Absolute, Automated 0.13 0.00 - 0.50 x10*3/uL    Lymphocytes Absolute 0.89 0.80 - 3.00 x10*3/uL    Monocytes Absolute 1.54 (H) 0.05 - 0.80 x10*3/uL    Eosinophils Absolute 0.04 0.00 - 0.40 x10*3/uL    Basophils Absolute 0.03 0.00 - 0.10 x10*3/uL   Comprehensive metabolic panel   Result Value Ref Range    Glucose 241 (H) 74 - 99 mg/dL    Sodium 140 136 - 145 mmol/L    Potassium 3.6 3.5 - 5.3 mmol/L    Chloride 104 98 - 107 mmol/L    Bicarbonate 26 21 - 32 mmol/L    Anion Gap 14 10 - 20 mmol/L    Urea Nitrogen 38 (H) 6 - 23 mg/dL    Creatinine 0.88 0.50 - 1.05 mg/dL    eGFR 60 (L) >60 mL/min/1.73m*2    Calcium 8.5 (L) 8.6 - 10.3 mg/dL    Albumin 3.5 3.4 - 5.0 g/dL    Alkaline Phosphatase 66 33 - 136 U/L    Total Protein 6.2 (L) 6.4 - 8.2 g/dL    AST 17 9 - 39 U/L    Bilirubin, Total 1.0 0.0 - 1.2 mg/dL    ALT 27 7 - 45 U/L   Creatine Kinase   Result Value Ref Range    Creatine Kinase 142 0 - 215 U/L   Magnesium   Result Value Ref Range    Magnesium 1.60 1.60 - 2.40 mg/dL   ECG 12 lead   Result Value Ref Range    Ventricular Rate 61 BPM    Atrial Rate 61 BPM    OK Interval 178 ms    QRS Duration 166 ms    QT Interval 446 ms    QTC Calculation(Bazett) 448 ms    R Axis -80 degrees    T Axis 89 degrees    QRS Count 10 beats    Q Onset 190 ms    T Offset 413 ms    QTC Fredericia 448 ms   Blood Culture    Specimen: Peripheral Venipuncture; Blood culture   Result Value Ref Range    Blood Culture Loaded on Instrument -  Culture in progress    Blood Culture    Specimen: Peripheral Venipuncture; Blood culture   Result Value Ref Range    Blood Culture Loaded on Instrument - Culture in progress    Urinalysis with Reflex Culture and Microscopic   Result Value Ref Range    Color, Urine Light-Yellow Light-Yellow, Yellow, Dark-Yellow    Appearance, Urine Clear Clear    Specific Gravity, Urine 1.014 1.005 - 1.035    pH, Urine 5.0 5.0, 5.5, 6.0, 6.5, 7.0, 7.5, 8.0    Protein, Urine NEGATIVE NEGATIVE, 10 (TRACE), 20 (TRACE) mg/dL    Glucose, Urine Normal Normal mg/dL    Blood, Urine NEGATIVE NEGATIVE    Ketones, Urine NEGATIVE NEGATIVE mg/dL    Bilirubin, Urine NEGATIVE NEGATIVE    Urobilinogen, Urine Normal Normal mg/dL    Nitrite, Urine NEGATIVE NEGATIVE    Leukocyte Esterase, Urine NEGATIVE NEGATIVE   Extra Urine Gray Tube   Result Value Ref Range    Extra Tube Hold for add-ons.    CBC and Auto Differential   Result Value Ref Range    WBC 11.4 (H) 4.4 - 11.3 x10*3/uL    nRBC 0.0 0.0 - 0.0 /100 WBCs    RBC 3.94 (L) 4.00 - 5.20 x10*6/uL    Hemoglobin 12.9 12.0 - 16.0 g/dL    Hematocrit 37.9 36.0 - 46.0 %    MCV 96 80 - 100 fL    MCH 32.7 26.0 - 34.0 pg    MCHC 34.0 32.0 - 36.0 g/dL    RDW 12.5 11.5 - 14.5 %    Platelets 188 150 - 450 x10*3/uL    Neutrophils % 76.6 40.0 - 80.0 %    Immature Granulocytes %, Automated 1.0 (H) 0.0 - 0.9 %    Lymphocytes % 12.0 13.0 - 44.0 %    Monocytes % 7.5 2.0 - 10.0 %    Eosinophils % 2.7 0.0 - 6.0 %    Basophils % 0.2 0.0 - 2.0 %    Neutrophils Absolute 8.73 (H) 1.60 - 5.50 x10*3/uL    Immature Granulocytes Absolute, Automated 0.11 0.00 - 0.50 x10*3/uL    Lymphocytes Absolute 1.37 0.80 - 3.00 x10*3/uL    Monocytes Absolute 0.85 (H) 0.05 - 0.80 x10*3/uL    Eosinophils Absolute 0.31 0.00 - 0.40 x10*3/uL    Basophils Absolute 0.02 0.00 - 0.10 x10*3/uL   Comprehensive metabolic panel   Result Value Ref Range    Glucose 106 (H) 74 - 99 mg/dL    Sodium 140 136 - 145 mmol/L    Potassium 3.7 3.5 - 5.3 mmol/L     Chloride 105 98 - 107 mmol/L    Bicarbonate 27 21 - 32 mmol/L    Anion Gap 12 10 - 20 mmol/L    Urea Nitrogen 32 (H) 6 - 23 mg/dL    Creatinine 0.89 0.50 - 1.05 mg/dL    eGFR 59 (L) >60 mL/min/1.73m*2    Calcium 7.9 (L) 8.6 - 10.3 mg/dL    Albumin 2.9 (L) 3.4 - 5.0 g/dL    Alkaline Phosphatase 56 33 - 136 U/L    Total Protein 5.5 (L) 6.4 - 8.2 g/dL    AST 14 9 - 39 U/L    Bilirubin, Total 0.8 0.0 - 1.2 mg/dL    ALT 23 7 - 45 U/L   B-Type Natriuretic Peptide   Result Value Ref Range     (H) 0 - 99 pg/mL        Assessment/Plan     Left humeral neck fracture  -conservative treatment  -nonweightbearing LUE  -Sling LUE, ok to remove for elbow ROM and hygiene, no shoulder abduction  -follow up in outpatient office with Dr Evans  -ortho to sign off, please call with any questions  -discussed with Dr Gtz.    Maria E Gonzalez, APRN-CNP  I spent 60 minutes with this patient and/or family.  Greater than 50% of this time was spent in counseling and/or coordination of care.

## 2024-06-11 ENCOUNTER — APPOINTMENT (OUTPATIENT)
Dept: RADIOLOGY | Facility: HOSPITAL | Age: 89
End: 2024-06-11
Payer: MEDICARE

## 2024-06-11 ENCOUNTER — APPOINTMENT (OUTPATIENT)
Dept: CARDIOLOGY | Facility: HOSPITAL | Age: 89
End: 2024-06-11
Payer: MEDICARE

## 2024-06-11 LAB
ANION GAP SERPL CALC-SCNC: 13 MMOL/L (ref 10–20)
AORTIC VALVE MEAN GRADIENT: 10 MMHG
AORTIC VALVE PEAK VELOCITY: 2.15 M/S
AV PEAK GRADIENT: 18.5 MMHG
AVA (PEAK VEL): 1.49 CM2
AVA (VTI): 1.55 CM2
BNP SERPL-MCNC: 328 PG/ML (ref 0–99)
BUN SERPL-MCNC: 23 MG/DL (ref 6–23)
CALCIUM SERPL-MCNC: 8.1 MG/DL (ref 8.6–10.3)
CHLORIDE SERPL-SCNC: 106 MMOL/L (ref 98–107)
CO2 SERPL-SCNC: 26 MMOL/L (ref 21–32)
CREAT SERPL-MCNC: 0.82 MG/DL (ref 0.5–1.05)
EGFRCR SERPLBLD CKD-EPI 2021: 65 ML/MIN/1.73M*2
EJECTION FRACTION APICAL 4 CHAMBER: 38.8
ERYTHROCYTE [DISTWIDTH] IN BLOOD BY AUTOMATED COUNT: 12.4 % (ref 11.5–14.5)
GLUCOSE BLD MANUAL STRIP-MCNC: 117 MG/DL (ref 74–99)
GLUCOSE BLD MANUAL STRIP-MCNC: 133 MG/DL (ref 74–99)
GLUCOSE BLD MANUAL STRIP-MCNC: 171 MG/DL (ref 74–99)
GLUCOSE BLD MANUAL STRIP-MCNC: 222 MG/DL (ref 74–99)
GLUCOSE SERPL-MCNC: 114 MG/DL (ref 74–99)
HCT VFR BLD AUTO: 41.1 % (ref 36–46)
HGB BLD-MCNC: 13.8 G/DL (ref 12–16)
LEFT ATRIUM VOLUME AREA LENGTH INDEX BSA: 29.1 ML/M2
LEFT VENTRICLE INTERNAL DIMENSION DIASTOLE: 4.24 CM (ref 3.5–6)
LEFT VENTRICULAR OUTFLOW TRACT DIAMETER: 2 CM
LEGIONELLA AG UR QL: NEGATIVE
LV EJECTION FRACTION BIPLANE: 40 %
MCH RBC QN AUTO: 33.2 PG (ref 26–34)
MCHC RBC AUTO-ENTMCNC: 33.6 G/DL (ref 32–36)
MCV RBC AUTO: 99 FL (ref 80–100)
MITRAL VALVE E/A RATIO: 1.13
NRBC BLD-RTO: 0 /100 WBCS (ref 0–0)
PLATELET # BLD AUTO: 183 X10*3/UL (ref 150–450)
POTASSIUM SERPL-SCNC: 3.6 MMOL/L (ref 3.5–5.3)
RBC # BLD AUTO: 4.16 X10*6/UL (ref 4–5.2)
RIGHT VENTRICLE FREE WALL PEAK S': 7.07 CM/S
RIGHT VENTRICLE PEAK SYSTOLIC PRESSURE: 61.1 MMHG
S PNEUM AG UR QL: NEGATIVE
SODIUM SERPL-SCNC: 141 MMOL/L (ref 136–145)
TRICUSPID ANNULAR PLANE SYSTOLIC EXCURSION: 1.7 CM
WBC # BLD AUTO: 11.3 X10*3/UL (ref 4.4–11.3)

## 2024-06-11 PROCEDURE — 93005 ELECTROCARDIOGRAM TRACING: CPT

## 2024-06-11 PROCEDURE — 1200000002 HC GENERAL ROOM WITH TELEMETRY DAILY

## 2024-06-11 PROCEDURE — 2500000001 HC RX 250 WO HCPCS SELF ADMINISTERED DRUGS (ALT 637 FOR MEDICARE OP): Performed by: INTERNAL MEDICINE

## 2024-06-11 PROCEDURE — 82947 ASSAY GLUCOSE BLOOD QUANT: CPT | Mod: 91

## 2024-06-11 PROCEDURE — 93306 TTE W/DOPPLER COMPLETE: CPT

## 2024-06-11 PROCEDURE — 36415 COLL VENOUS BLD VENIPUNCTURE: CPT | Performed by: NURSE PRACTITIONER

## 2024-06-11 PROCEDURE — 93306 TTE W/DOPPLER COMPLETE: CPT | Performed by: INTERNAL MEDICINE

## 2024-06-11 PROCEDURE — 97116 GAIT TRAINING THERAPY: CPT | Mod: GP

## 2024-06-11 PROCEDURE — 2500000001 HC RX 250 WO HCPCS SELF ADMINISTERED DRUGS (ALT 637 FOR MEDICARE OP): Performed by: HOSPITALIST

## 2024-06-11 PROCEDURE — 85027 COMPLETE CBC AUTOMATED: CPT | Performed by: NURSE PRACTITIONER

## 2024-06-11 PROCEDURE — 83880 ASSAY OF NATRIURETIC PEPTIDE: CPT | Performed by: INTERNAL MEDICINE

## 2024-06-11 PROCEDURE — 71045 X-RAY EXAM CHEST 1 VIEW: CPT | Performed by: RADIOLOGY

## 2024-06-11 PROCEDURE — 97165 OT EVAL LOW COMPLEX 30 MIN: CPT | Mod: GO

## 2024-06-11 PROCEDURE — 99232 SBSQ HOSP IP/OBS MODERATE 35: CPT | Performed by: INTERNAL MEDICINE

## 2024-06-11 PROCEDURE — 71045 X-RAY EXAM CHEST 1 VIEW: CPT

## 2024-06-11 PROCEDURE — 80048 BASIC METABOLIC PNL TOTAL CA: CPT | Performed by: NURSE PRACTITIONER

## 2024-06-11 RX ORDER — AMLODIPINE BESYLATE 5 MG/1
5 TABLET ORAL DAILY
Status: DISCONTINUED | OUTPATIENT
Start: 2024-06-11 | End: 2024-06-13 | Stop reason: HOSPADM

## 2024-06-11 RX ADMIN — ATORVASTATIN CALCIUM 40 MG: 40 TABLET, FILM COATED ORAL at 10:17

## 2024-06-11 RX ADMIN — CARVEDILOL 6.25 MG: 6.25 TABLET, FILM COATED ORAL at 17:43

## 2024-06-11 RX ADMIN — PANTOPRAZOLE SODIUM 40 MG: 40 TABLET, DELAYED RELEASE ORAL at 05:32

## 2024-06-11 RX ADMIN — LOSARTAN POTASSIUM 50 MG: 50 TABLET, FILM COATED ORAL at 10:17

## 2024-06-11 RX ADMIN — APIXABAN 2.5 MG: 2.5 TABLET, FILM COATED ORAL at 20:10

## 2024-06-11 RX ADMIN — LEVETIRACETAM 750 MG: 500 TABLET, FILM COATED ORAL at 20:10

## 2024-06-11 RX ADMIN — Medication 3 MG: at 20:10

## 2024-06-11 RX ADMIN — AMLODIPINE BESYLATE 5 MG: 5 TABLET ORAL at 10:17

## 2024-06-11 RX ADMIN — LEVETIRACETAM 750 MG: 500 TABLET, FILM COATED ORAL at 10:17

## 2024-06-11 RX ADMIN — APIXABAN 2.5 MG: 2.5 TABLET, FILM COATED ORAL at 10:17

## 2024-06-11 RX ADMIN — CARVEDILOL 6.25 MG: 6.25 TABLET, FILM COATED ORAL at 10:17

## 2024-06-11 ASSESSMENT — COGNITIVE AND FUNCTIONAL STATUS - GENERAL
TURNING FROM BACK TO SIDE WHILE IN FLAT BAD: A LOT
CLIMB 3 TO 5 STEPS WITH RAILING: A LOT
HELP NEEDED FOR BATHING: A LOT
HELP NEEDED FOR BATHING: A LOT
MOVING TO AND FROM BED TO CHAIR: A LOT
WALKING IN HOSPITAL ROOM: A LOT
DRESSING REGULAR UPPER BODY CLOTHING: A LITTLE
TOILETING: A LITTLE
HELP NEEDED FOR BATHING: A LOT
WALKING IN HOSPITAL ROOM: TOTAL
DRESSING REGULAR UPPER BODY CLOTHING: A LITTLE
PERSONAL GROOMING: A LITTLE
TOILETING: A LOT
CLIMB 3 TO 5 STEPS WITH RAILING: A LOT
STANDING UP FROM CHAIR USING ARMS: A LOT
EATING MEALS: A LITTLE
MOBILITY SCORE: 12
STANDING UP FROM CHAIR USING ARMS: A LOT
MOVING FROM LYING ON BACK TO SITTING ON SIDE OF FLAT BED WITH BEDRAILS: A LOT
MOVING FROM LYING ON BACK TO SITTING ON SIDE OF FLAT BED WITH BEDRAILS: A LOT
TURNING FROM BACK TO SIDE WHILE IN FLAT BAD: A LOT
TOILETING: A LOT
DAILY ACTIVITIY SCORE: 14
PERSONAL GROOMING: A LITTLE
MOBILITY SCORE: 10
DRESSING REGULAR LOWER BODY CLOTHING: TOTAL
DAILY ACTIVITIY SCORE: 14
PERSONAL GROOMING: A LITTLE
DAILY ACTIVITIY SCORE: 17
MOVING FROM LYING ON BACK TO SITTING ON SIDE OF FLAT BED WITH BEDRAILS: A LOT
EATING MEALS: A LITTLE
CLIMB 3 TO 5 STEPS WITH RAILING: TOTAL
DRESSING REGULAR LOWER BODY CLOTHING: A LITTLE
MOBILITY SCORE: 12
MOVING TO AND FROM BED TO CHAIR: A LOT
TURNING FROM BACK TO SIDE WHILE IN FLAT BAD: A LOT
WALKING IN HOSPITAL ROOM: A LOT
DRESSING REGULAR LOWER BODY CLOTHING: TOTAL
DRESSING REGULAR UPPER BODY CLOTHING: A LITTLE
MOVING TO AND FROM BED TO CHAIR: A LOT
STANDING UP FROM CHAIR USING ARMS: A LOT
EATING MEALS: A LITTLE

## 2024-06-11 ASSESSMENT — PAIN SCALES - GENERAL
PAINLEVEL_OUTOF10: 10 - WORST POSSIBLE PAIN
PAINLEVEL_OUTOF10: 0 - NO PAIN
PAINLEVEL_OUTOF10: 0 - NO PAIN
PAINLEVEL_OUTOF10: 10 - WORST POSSIBLE PAIN
PAINLEVEL_OUTOF10: 0 - NO PAIN

## 2024-06-11 ASSESSMENT — PAIN - FUNCTIONAL ASSESSMENT
PAIN_FUNCTIONAL_ASSESSMENT: 0-10

## 2024-06-11 ASSESSMENT — ACTIVITIES OF DAILY LIVING (ADL): ADL_ASSISTANCE: NEEDS ASSISTANCE

## 2024-06-11 NOTE — PROGRESS NOTES
6/11/2024 10:29 AM I spoke with daughter Susie. Patient lives with daughter Susie. I discussed PT recommendations. Daughter agrees to referral to  Acute Rehab. She would like more information from Ortho. Message sent to NP to contact daughter. Lanie BRANTLEY

## 2024-06-11 NOTE — CARE PLAN
Problem: Safety - Adult  Goal: Free from fall injury  Outcome: Progressing     Problem: Discharge Planning  Goal: Discharge to home or other facility with appropriate resources  Outcome: Progressing     Problem: Chronic Conditions and Co-morbidities  Goal: Patient's chronic conditions and co-morbidity symptoms are monitored and maintained or improved  Outcome: Progressing     Problem: Skin  Goal: Decreased wound size/increased tissue granulation at next dressing change  Outcome: Progressing  Goal: Participates in plan/prevention/treatment measures  Outcome: Progressing  Goal: Prevent/manage excess moisture  Outcome: Progressing  Flowsheets (Taken 6/11/2024 0131)  Prevent/manage excess moisture: Cleanse incontinence/protect with barrier cream  Goal: Prevent/minimize sheer/friction injuries  Outcome: Progressing  Goal: Promote/optimize nutrition  Outcome: Progressing  Goal: Promote skin healing  Outcome: Progressing   The patient's goals for the shift include      The clinical goals for the shift include Pt will remain safe and not fall during shift    Over the shift, the patient did not make progress toward the following goals. Barriers to progression include . Recommendations to address these barriers include .

## 2024-06-11 NOTE — PROGRESS NOTES
Physical Therapy    Physical Therapy Treatment    Patient Name: Marian Fritz  MRN: [id number]  Today's Date: 6/11/2024  Time Calculation  Start Time: 1255  Stop Time: 1307  Time Calculation (min): 12 min    Assessment/Plan   PT Assessment  PT Assessment Results: Decreased strength, Decreased range of motion, Decreased endurance, Impaired balance, Decreased mobility, Decreased cognition, Pain  Rehab Prognosis: Good  Barriers to Discharge: none for PT  Evaluation/Treatment Tolerance: Patient limited by fatigue  Medical Staff Made Aware: Yes  Strengths: Access to adaptive/assistive products, Housing layout, Support of extended family/friends, Premorbid level of function  Barriers to Participation: Comorbidities  End of Session Communication: Bedside nurse  Assessment Comment: Pt continues to required mod A for all mobility. She will benefit from further PT to increase indep.  End of Session Patient Position: Up in chair, Alarm on  PT Plan  Inpatient/Swing Bed or Outpatient: Inpatient  PT Plan  Treatment/Interventions: Bed mobility, Transfer training, Gait training, Balance training, Strengthening, Endurance training, Range of motion, Therapeutic exercise, Therapeutic activity  PT Plan: Skilled PT  PT Frequency: 3 times per week  PT Discharge Recommendations: Moderate intensity level of continued care  PT Recommended Transfer Status: Assist x1  PT - OK to Discharge: Yes (Per PT POC)      General Visit Information:   PT  Visit  PT Received On: 06/11/24  Response to Previous Treatment: Patient with no complaints from previous session.  General  Family/Caregiver Present: No  Co-Treatment: OT  Co-Treatment Reason: to maximize pt safety and increase ambulation  Prior to Session Communication: Bedside nurse  Patient Position Received: Bed, 3 rail up, Alarm on  Preferred Learning Style: kinesthetic, auditory  General Comment: Pt now NWB LUE with sling due to + fx. Pt pleasantly confused, does not recall PT session at first,  but with cues, starts to recall the session.    Subjective   Precautions:  Precautions  Hearing/Visual Limitations: [place]  UE Weight Bearing Status: Left Non-Weight Bearing, In Sling When Out of Bed  Medical Precautions: Fall precautions    Objective   Pain:  Pain Assessment  Pain Assessment: 0-10  Pain Score: 10 - Worst possible pain  Pain Type: Acute pain  Pain Location: Arm  Pain Orientation: Left  Cognition:  Cognition  Overall Cognitive Status: Impaired at baseline  Orientation Level: Disoriented to time  Following Commands: Follows one step commands without difficulty  Postural Control:  Static Sitting Balance  Static Sitting-Balance Support: Feet supported, No upper extremity supported  Static Sitting-Level of Assistance: Close supervision  Static Standing Balance  Static Standing-Balance Support: Right upper extremity supported  Static Standing-Level of Assistance: Moderate assistance  Static Standing-Comment/Number of Minutes: able to get more erect today, but continues to exhibit posterior COG  Activity Tolerance:  Activity Tolerance  Endurance: Tolerates less than 10 min exercise, no significant change in vital signs  Treatments:  Balance/Neuromuscular Re-Education  Balance/Neuromuscular Re-Education Activity Performed: Yes  Balance/Neuromuscular Re-Education Activity 1: To increase standing balance, core strength, functional endurance, and facilitate postural/trunk control, pt stood with hemiwalker and mod A x 30 second. Mod verbal cues for upright posture, and B hip ext and to move COG forward.  Duration limited d/t fatigue.    Bed Mobility 1  Bed Mobility 1: Supine to sitting  Level of Assistance 1: Moderate assistance, Minimal verbal cues, Minimal tactile cues  Bed Mobility Comments 1: Pt prefers to transfer OOB towards the right. Able to assist by moving BLE towards EOB, but required assist with lifting trunk off surface of the bed.    Ambulation/Gait Training  Ambulation/Gait Training Performed:  Yes  Ambulation/Gait Training 1  Surface 1: Level tile  Device 1: Jn Walker  Gait Support Devices: Gait belt  Assistance 1: Moderate assistance, Moderate verbal cues, Moderate tactile cues  Quality of Gait 1:  (very small step lengths with multiple rests, posterior lean, + fear of falling, narrow ISHAAN)  Comments/Distance (ft) 1: 5' (chair needed to be pulled up behind pt due to fatigue)  Transfers  Transfer: Yes  Transfer 1  Technique 1: Sit to stand, Stand to sit  Transfer Device 1: Gait belt, Jn-walker  Transfer Level of Assistance 1: Moderate assistance, Moderate verbal cues, Moderate tactile cues  Trials/Comments 1: Cues for proper transfer technique and hand placement provided.    Outcome Measures:  [facility] Basic Mobility  Turning from your back to your side while in a flat bed without using bedrails: A lot  Moving from lying on your back to sitting on the side of a flat bed without using bedrails: A lot  Moving to and from bed to chair (including a wheelchair): A lot  Standing up from a chair using your arms (e.g. wheelchair or bedside chair): A lot  To walk in hospital room: A lot  Climbing 3-5 steps with railing: A lot  Basic Mobility - Total Score: 12    Education Documentation  Precautions, taught by Virgie Reagan, PT at 6/11/2024  1:26 PM.  Learner: Patient  Readiness: Acceptance  Method: Explanation, Demonstration  Response: Demonstrated Understanding, Needs Reinforcement    Body Mechanics, taught by Virgie Reagan PT at 6/11/2024  1:26 PM.  Learner: Patient  Readiness: Acceptance  Method: Explanation, Demonstration  Response: Demonstrated Understanding, Needs Reinforcement    Mobility Training, taught by Virgie Reagan PT at 6/11/2024  1:26 PM.  Learner: Patient  Readiness: Acceptance  Method: Explanation, Demonstration  Response: Demonstrated Understanding, Needs Reinforcement    Education Comments  No comments found.        OP EDUCATION:  Outpatient Education  Individual(s) Educated:  Patient  Education Provided: Fall Risk, POC  Patient/Caregiver Demonstrated Understanding: yes  Plan of Care Discussed and Agreed Upon:  (needs reinforcement)  Patient Response to Education: Patient/Caregiver Performed Return Demonstration of Exercises/Activities    Encounter Problems       Encounter Problems (Active)       Balance       STG - Maintains static standing balance with R upper extremity support x 5 minutes with CGA (Progressing)       Start:  06/10/24    Expected End:  06/24/24       INTERVENTIONS:  1. Practice standing with minimal support.  2. Educate patient about standing tolerance.  3. Educate patient about independence with gait, transfers, and ADL's.  4. Educate patient about use of assistive device.  5. Educate patient about self-directed care.         STG - Maintains dynamic sitting balance with R upper extremity support x 5 minutes with supervision (Progressing)       Start:  06/10/24    Expected End:  06/24/24       INTERVENTIONS:  1. Practice sitting on the edge of a bed/mat with minimal support.  2. Educate patient about maintining total hip precautions while maintaining balance.  3. Educate patient about pressure relief.  4. Educate patient about use of assistive device.            Mobility       STG - Patient will ambulate with R handheld assist or QC 50' x 2 with min A (Progressing)       Start:  06/10/24    Expected End:  06/24/24            Increase BLE strength to attain functional goals achieved through supine, seated, and standing TE.       Start:  06/10/24    Expected End:  06/24/24               PT Transfers       STG - Transfer from bed to chair with R handheld assist or QC with CGA (Progressing)       Start:  06/10/24    Expected End:  06/24/24            STG - Patient to transfer to and from sit to supine with CGA (Progressing)       Start:  06/10/24    Expected End:  06/24/24            STG - Patient will transfer sit to and from stand with R handheld assist or QC with CGA.  (Progressing)       Start:  06/10/24    Expected End:  06/24/24

## 2024-06-11 NOTE — PROGRESS NOTES
Occupational Therapy    Evaluation    Patient Name: Marian Fritz  MRN: [id number]  Today's Date: 6/11/2024  Time Calculation  Start Time: 1254  Stop Time: 1306  Time Calculation (min): 12 min        Assessment:  Prognosis: Good  Barriers to Discharge: None  Evaluation/Treatment Tolerance: Patient limited by fatigue  Medical Staff Made Aware: Yes  End of Session Communication: Bedside nurse  End of Session Patient Position: Up in chair, Alarm on  OT Assessment Results: Decreased ADL status, Decreased endurance, Decreased functional mobility  Prognosis: Good  Barriers to Discharge: None  Evaluation/Treatment Tolerance: Patient limited by fatigue  Medical Staff Made Aware: Yes  Strengths: Access to adaptive/assistive products, Housing layout, Support of extended family/friends, Premorbid level of function  Barriers to Participation: Comorbidities  Plan:  Treatment Interventions: ADL retraining, Functional transfer training, Endurance training, Cognitive reorientation  OT Frequency: 3 times per week  OT Discharge Recommendations: Moderate intensity level of continued care  OT Recommended Transfer Status: Moderate assist  OT - OK to Discharge: Yes  Treatment Interventions: ADL retraining, Functional transfer training, Endurance training, Cognitive reorientation    Subjective   Current Problem:  1. Hypoxia        2. Community acquired pneumonia, unspecified laterality        3. Sepsis, due to unspecified organism, unspecified whether acute organ dysfunction present (Multi)        4. Cerebrovascular accident (CVA), unspecified mechanism (Multi)  Transthoracic Echo (TTE) Complete    Transthoracic Echo (TTE) Complete      5. 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 Pt has no  recollection what happened, down time unknown.  X-rays and CTH negative for acute findings, CT for L shoulder pending.  Referred By: Claudette Nguyễn CNP  Past Medical History Relevant to Rehab:   Past Medical History:   Diagnosis Date    Personal history of other diseases of the circulatory system     History of hypertension    Personal history of other diseases of the nervous system and sense organs 12/16/2013    History of complex partial epilepsy    Pure hypercholesterolemia, unspecified     High cholesterol    Unspecified cataract     Cataracts, both eyes    Unspecified convulsions (Multi)     Seizures     Past Surgical History:   Procedure Laterality Date    CATARACT EXTRACTION  12/01/2014    Cataract Surgery    CT ANGIO NECK  9/16/2018    CT NECK ANGIO W AND WO IV CONTRAST 9/16/2018 [facility] EMERGENCY LEGACY    CT HEAD ANGIO W AND WO IV CONTRAST  9/16/2018    CT HEAD ANGIO W AND WO IV CONTRAST 9/16/2018 [facility] EMERGENCY LEGACY    MR HEAD ANGIO WO IV CONTRAST  11/28/2018    MR HEAD ANGIO WO IV CONTRAST 11/28/2018 [facility] [place]    MR NECK ANGIO WO IV CONTRAST  11/28/2018    MR NECK ANGIO WO IV CONTRAST 11/28/2018 [facility] [place]    OTHER SURGICAL HISTORY  12/01/2014    Simple Mastectomy Right Breast       Family/Caregiver Present: No  Co-Treatment: PT  Co-Treatment Reason: to maximize pt. participation and increase pt. safety  Prior to Session Communication: Bedside nurse  Patient Position Received: Bed, 3 rail up, Alarm on  Preferred Learning Style: kinesthetic, auditory  General Comment: Pt NWB LUE with sling due to + fx. Pt pleasantly confused. Pt. reluctantly agreeable to OT session.  Precautions:  Hearing/Visual Limitations: [place]  UE Weight Bearing Status: Left Non-Weight Bearing  Medical Precautions: Fall precautions, Oxygen therapy device and L/min  Prosthesis/Orthosis Used: Upper extremity orthosis/prosthesis  Precautions Comment: soft sling, NWBing L UE    Pain:  Pain Assessment  Pain  Assessment: 0-10  Pain Score: 10 - Worst possible pain  Pain Type: Acute pain  Pain Location: Arm  Pain Orientation: Left    Objective   Cognition:  Overall Cognitive Status: Impaired at baseline  Orientation Level: Disoriented to time  Following Commands: Follows all commands and directions without difficulty  Insight: Mild  Impulsive: Within functional limits    Home Living:  Type of Home: House  Lives With: Adult children (daughter and son-in-law)  Home Adaptive Equipment: Walker rolling or standard  Home Layout: Able to live on main level with bedroom/bathroom, Multi-level  Home Access: Stairs to enter without rails  Entrance Stairs-Rails: None  Entrance Stairs-Number of Steps: 3  Bathroom Shower/Tub: Walk-in shower  Bathroom Toilet: Standard  Bathroom Equipment: Grab bars in shower, Shower chair with back  Prior Function:  Level of [place]:  (assists with bathing, I with dressing)  Receives Help From: Family  ADL Assistance: Needs assistance  Ambulatory Assistance: Independent (FWW)  Leisure: casino  Hand Dominance: Right  IADL History:  Current License: No  Mode of Transportation: Family  ADL:  Eating Assistance: Stand by  Eating Deficit: Setup  LE Dressing Assistance: Maximal  LE Dressing Deficit:  (Pt. required Max A to adjsut socks while in supine)  Activity Tolerance:  Endurance: Tolerates less than 10 min exercise, no significant change in vital signs  Early Mobility/Exercise Safety Screen: Proceed with mobilization - No exclusion criteria met  Bed Mobility/Transfers: Bed Mobility  Bed Mobility: Yes  Bed Mobility 1  Bed Mobility 1: Supine to sitting  Level of Assistance 1: Maximum assistance, Moderate verbal cues, Moderate tactile cues  Bed Mobility Comments 1: Pt prefers to transfer OOB towards the right. Able to assist by moving BLE towards EOB, but required assist with lifting trunk off surface of the bed.    Transfers  Transfer: Yes  Transfer 1  Technique 1: Sit to stand, Stand to sit  Transfer  Device 1: Gait belt, Jn-walker  Transfer Level of Assistance 1: Moderate assistance, Moderate verbal cues, Moderate tactile cues  Trials/Comments 1: Cues for proper transfer technique and hand placement provided.  Transfers 2  Transfer to 2: Chair with arms  Technique 2: Stand to sit  Transfer Device 2: Gait belt  Transfer Level of Assistance 2: Maximum assistance, Moderate verbal cues, Moderate tactile cues  Trials/Comments 2: Verbal and tactile cues to line up to and reach back for sitting surface before sitting. Hands on assist needed to guide her R hand to armrest.  Transfers 3  Technique 3: Sit to stand, Stand to sit  Transfer Device 3: Gait belt  Transfer Level of Assistance 3: Moderate assistance, Moderate verbal cues, Moderate tactile cues  Trials/Comments 3: Verbal cues for appropriate hand placement     Vision:Vision - Basic Assessment  Current Vision: Wears glasses only for reading    Perception:  Inattention/Neglect: Appears intact    Hand Function:  Gross Grasp: Functional  Coordination: Functional  Extremities: RUE   RUE : Within Functional Limits and LUE   LUE: Exceptions to WFL (Non-WBing L UE)    Outcome Measures:[facility] Daily Activity  Putting on and taking off regular lower body clothing: Total  Bathing (including washing, rinsing, drying): A lot  Putting on and taking off regular upper body clothing: A little  Toileting, which includes using toilet, bedpan or urinal: A lot  Taking care of personal grooming such as brushing teeth: A little  Eating Meals: A little  Daily Activity - Total Score: 14        Education Documentation  Body Mechanics, taught by Gay Abbott OT at 6/11/2024  2:44 PM.  Learner: Patient  Readiness: Acceptance  Method: Explanation, Demonstration  Response: Verbalizes Understanding, Demonstrated Understanding, Needs Reinforcement    ADL Training, taught by Gay Abbott OT at 6/11/2024  2:44 PM.  Learner: Patient  Readiness: Acceptance  Method: Explanation,  Demonstration  Response: Verbalizes Understanding, Demonstrated Understanding, Needs Reinforcement    Education Comments  No comments found.        OP EDUCATION:  Education  Individual(s) Educated: Patient  Education Provided: Fall precautons, Risk and benefits of OT discussed with patient or other, POC discussed and agreed upon  Risk and Benefits Discussed with Patient/Caregiver/Other: yes  Patient/Caregiver Demonstrated Understanding: yes  Plan of Care Discussed and Agreed Upon: yes  Patient Response to Education: Patient/Caregiver Verbalized Understanding of Information, Patient/Caregiver Performed Return Demonstration of Exercises/Activities, Patient/Caregiver Asked Appropriate Questions    Goals:  Encounter Problems       Encounter Problems (Active)       ADLs       Patient with complete upper body dressing with supervision level of assistance donning and doffing all UE clothes  while supported sitting       Start:  06/11/24    Expected End:  06/25/24            Patient with complete lower body dressing with minimal assist  level of assistance donning and doffing all LE clothes  with PRN adaptive equipment while supported sitting       Start:  06/11/24    Expected End:  06/25/24            Patient will complete daily grooming tasks brushing teeth and washing face/hair with stand by assist level of assistance and PRN adaptive equipment while supported sitting.       Start:  06/11/24    Expected End:  06/25/24            Patient will complete toileting including hygiene clothing management/hygiene with minimal assist  level of assistance and raised toilet seat and grab bars.       Start:  06/11/24    Expected End:  06/25/24               TRANSFERS       Patient will perform bed mobility minimal assist  level of assistance and bed rails in order to improve safety and independence with mobility       Start:  06/11/24    Expected End:  06/25/24            Patient will complete sit to stand transfer with minimal  assist  level of assistance and least restrictive device in order to improve safety and prepare for out of bed mobility.       Start:  06/11/24    Expected End:  06/25/24

## 2024-06-11 NOTE — PROGRESS NOTES
Marian Fritz is a 89 y.o. female     Patient laying comfortably in bed  Shoulder pain controlled  Orthopedics recommends nonsurgical approach  Will need to go to rehab  Normal procalcitonin  Pneumonia ruled out  Will stop the antibiotics    Review of Systems     Constitutional: no fever, no chills,   Cardiovascular: no chest pain   Respiratory: no cough, wheezing or shortness of breath a  Gastrointestinal: no abdominal pain, no constipation, no melena, no nausea, no diarrhea, no vomiting and no blood in stools.   Neurological: no headache,   All other systems have been reviewed and are negative for complaint.       Vitals:    06/11/24 1159   BP: 153/67   Pulse:    Resp:    Temp: 36.9 °C (98.4 °F)   SpO2: 98%        Scheduled medications  amLODIPine, 5 mg, oral, Daily  apixaban, 2.5 mg, oral, BID  atorvastatin, 40 mg, oral, Daily  carvedilol, 6.25 mg, oral, BID  levETIRAcetam, 750 mg, oral, BID  losartan, 50 mg, oral, Daily  melatonin, 3 mg, oral, Nightly  pantoprazole, 40 mg, oral, Daily before breakfast   Or  pantoprazole, 40 mg, intravenous, Daily before breakfast      Continuous medications     PRN medications  PRN medications: acetaminophen, acetaminophen, hydrALAZINE **FOLLOWED BY** [START ON 6/12/2024] hydrALAZINE, ondansetron ODT **OR** ondansetron, oxygen    Lab Review   Results from last 7 days   Lab Units 06/11/24  0619 06/10/24  0627 06/09/24  1640   WBC AUTO x10*3/uL 11.3 11.4* 17.1*   HEMOGLOBIN g/dL 13.8 12.9 14.1   HEMATOCRIT % 41.1 37.9 41.4   PLATELETS AUTO x10*3/uL 183 188 218     Results from last 7 days   Lab Units 06/11/24  0619 06/10/24  0627 06/09/24  1640   SODIUM mmol/L 141 140 140   POTASSIUM mmol/L 3.6 3.7 3.6   CHLORIDE mmol/L 106 105 104   CO2 mmol/L 26 27 26   BUN mg/dL 23 32* 38*   CREATININE mg/dL 0.82 0.89 0.88   CALCIUM mg/dL 8.1* 7.9* 8.5*   PROTEIN TOTAL g/dL  --  5.5* 6.2*   BILIRUBIN TOTAL mg/dL  --  0.8 1.0   ALK PHOS U/L  --  56 66   ALT U/L  --  23 27   AST U/L  --  14 17    GLUCOSE mg/dL 114* 106* 241*            CT shoulder left wo IV contrast   Final Result   1. Acute oblique fracture through left humeral neck (predominantly   extending through anatomic neck of the humerus) superimposed on   remote fracture union of proximal humeral metadiaphysis as described   above. There is impaction of fracture fragments with anterior   angulation of fracture apex as described above.   2. Moderate volume glenohumeral joint effusion, likely favored to be   reactive. Linearly oriented calcifications in the expected region of   distal rotator cuff tendon could be related to hydroxyapatite   deposition versus sequela of chronic synovitis with associated   dystrophic calcifications.   3. Mild-to-moderate glenohumeral and moderate to severe   acromioclavicular joint arthrosis.   4. Findings concerning for age-indeterminate nondisplaced fractures   through lateral aspect of left 3rd through 5th ribs. Recommend   correlation with point tenderness in this location.             MACRO:   Critical Finding:  See findings. Notification was initiated on   6/10/2024 at 11:23 am by Dr. Sheridan Trent.  (**-YCF-**)        Signed by: Sheridan Trent 6/10/2024 11:23 AM   Dictation workstation:   [id number]      CT head wo IV contrast   Final Result   1. No evidence of acute intracranial abnormality.   2. Moderate to severe generalized volume loss and progression of   white matter hypodensities since 2022, consistent with advanced   microvascular ischemic disease. There are areas of encephalomalacia   consistent with infarcts, right parietal and left medial temporal   lobes; right parietal infarct not obviously present previously but   does not have acute features. The left temporal lobe infarct has   evolved since prior. Left thalamic lacunar infarcts are new since   prior without acute features. If concern for acute infarct, MRI is   suggested. No hemorrhage.   3. No evidence of acute cervical spine fracture.    4. Multilevel severe cervical spondylosis, as above.                  Signed by: Betty Adams 6/9/2024 7:02 PM   Dictation workstation:   [id number]      CT cervical spine wo IV contrast   Final Result   1. No evidence of acute intracranial abnormality.   2. Moderate to severe generalized volume loss and progression of   white matter hypodensities since 2022, consistent with advanced   microvascular ischemic disease. There are areas of encephalomalacia   consistent with infarcts, right parietal and left medial temporal   lobes; right parietal infarct not obviously present previously but   does not have acute features. The left temporal lobe infarct has   evolved since prior. Left thalamic lacunar infarcts are new since   prior without acute features. If concern for acute infarct, MRI is   suggested. No hemorrhage.   3. No evidence of acute cervical spine fracture.   4. Multilevel severe cervical spondylosis, as above.                  Signed by: Betty Adams 6/9/2024 7:02 PM   Dictation workstation:   [id number]      XR pelvis 1-2 views   Final Result   No acute bony abnormalities on x-ray examination of the AP pelvis.   Signed by Trina Roth DO      XR chest 1 view   Final Result   Multilobar subtle patchy airspace opacities.  Please correlate for   pneumonia.   Signed by Darrel Robles,       XR shoulder right 2+ views   Final Result   Degenerative changes without acute fracture or malalignment.    Narrowing of subacromial space suggesting rotator cuff pathology.    Calcific tendinopathy of the supraspinatus tendon.   Signed by Darrel Robles,       XR shoulder left 2+ views   Final Result   Chronic deformity of the proximal humerus without acute fracture or   malalignment.  Degenerative changes.   Signed by Darrel Robles DO      Transthoracic Echo (TTE) Complete    (Results Pending)         Physical Exam     Constitutional   General appearance: Alert and in no acute distress.     Pulmonary    Respiratory assessment: No respiratory distress, normal respiratory rhythm and effort.    Auscultation of Lungs: Clear bilateral breath sounds.   Cardiovascular   Auscultation of heart: Apical pulse normal, heart rate and rhythm normal, normal S1 and S2, no murmurs and no pericardial rub.    Exam for edema: No peripheral edema.   Abdomen   Abdominal Exam: No bruits, normal bowel sounds, soft, non-tender, no abdominal mass palpated.    Liver and Spleen exam: No hepato-splenomegaly.   Musculoskeletal   Examination of gait: ROM intact  Skin inspection: Normal skin color and pigmentation, normal skin turgor and no visible rash.   Neurologic   Cranial nerves: Nerves 2-12 were intact, no focal neuro defects.     Assessment/Plan      #Lacunar infarct  Seen by neurology  They have cleared her for discharge  Acute CVA ruled out    #Left humeral oblique fracture s/p fall at home  Seen by orthopedics  Nonsurgical approach  Continue pain control  PT OT consulted  Will need to go to rehab    #Pneumonia ruled out  Normal procalcitonin  Will stop antibiotics  Hypoxia has resolved  Will repeat a chest x-ray to take a another look at the patchy infiltrates seen    #Hypertension  Resume home medications      #Dyslipidemia  Continue statins with a target LDL less than 70    #Atrial fibrillation  Rate controlled  Continue Eliquis    #Seizure disorder  Stable on Keppra

## 2024-06-12 ENCOUNTER — APPOINTMENT (OUTPATIENT)
Dept: ORTHOPEDIC SURGERY | Facility: HOSPITAL | Age: 89
End: 2024-06-12
Payer: MEDICARE

## 2024-06-12 ENCOUNTER — APPOINTMENT (OUTPATIENT)
Dept: CARDIOLOGY | Facility: HOSPITAL | Age: 89
End: 2024-06-12
Payer: MEDICARE

## 2024-06-12 LAB
ANION GAP SERPL CALC-SCNC: 11 MMOL/L (ref 10–20)
BUN SERPL-MCNC: 22 MG/DL (ref 6–23)
CALCIUM SERPL-MCNC: 8.2 MG/DL (ref 8.6–10.3)
CHLORIDE SERPL-SCNC: 104 MMOL/L (ref 98–107)
CO2 SERPL-SCNC: 28 MMOL/L (ref 21–32)
CREAT SERPL-MCNC: 0.75 MG/DL (ref 0.5–1.05)
EGFRCR SERPLBLD CKD-EPI 2021: 73 ML/MIN/1.73M*2
ERYTHROCYTE [DISTWIDTH] IN BLOOD BY AUTOMATED COUNT: 12.5 % (ref 11.5–14.5)
GLUCOSE BLD MANUAL STRIP-MCNC: 108 MG/DL (ref 74–99)
GLUCOSE BLD MANUAL STRIP-MCNC: 130 MG/DL (ref 74–99)
GLUCOSE BLD MANUAL STRIP-MCNC: 147 MG/DL (ref 74–99)
GLUCOSE BLD MANUAL STRIP-MCNC: 172 MG/DL (ref 74–99)
GLUCOSE BLD MANUAL STRIP-MCNC: 177 MG/DL (ref 74–99)
GLUCOSE SERPL-MCNC: 133 MG/DL (ref 74–99)
HCT VFR BLD AUTO: 36 % (ref 36–46)
HGB BLD-MCNC: 12.4 G/DL (ref 12–16)
MCH RBC QN AUTO: 32.9 PG (ref 26–34)
MCHC RBC AUTO-ENTMCNC: 34.4 G/DL (ref 32–36)
MCV RBC AUTO: 96 FL (ref 80–100)
NRBC BLD-RTO: 0 /100 WBCS (ref 0–0)
PLATELET # BLD AUTO: 194 X10*3/UL (ref 150–450)
POTASSIUM SERPL-SCNC: 3.9 MMOL/L (ref 3.5–5.3)
RBC # BLD AUTO: 3.77 X10*6/UL (ref 4–5.2)
SODIUM SERPL-SCNC: 139 MMOL/L (ref 136–145)
WBC # BLD AUTO: 9.7 X10*3/UL (ref 4.4–11.3)

## 2024-06-12 PROCEDURE — 93005 ELECTROCARDIOGRAM TRACING: CPT

## 2024-06-12 PROCEDURE — 80048 BASIC METABOLIC PNL TOTAL CA: CPT | Performed by: NURSE PRACTITIONER

## 2024-06-12 PROCEDURE — 99232 SBSQ HOSP IP/OBS MODERATE 35: CPT | Performed by: INTERNAL MEDICINE

## 2024-06-12 PROCEDURE — 2500000001 HC RX 250 WO HCPCS SELF ADMINISTERED DRUGS (ALT 637 FOR MEDICARE OP): Performed by: INTERNAL MEDICINE

## 2024-06-12 PROCEDURE — 85027 COMPLETE CBC AUTOMATED: CPT | Performed by: NURSE PRACTITIONER

## 2024-06-12 PROCEDURE — 2500000001 HC RX 250 WO HCPCS SELF ADMINISTERED DRUGS (ALT 637 FOR MEDICARE OP): Performed by: HOSPITALIST

## 2024-06-12 PROCEDURE — 36415 COLL VENOUS BLD VENIPUNCTURE: CPT | Performed by: NURSE PRACTITIONER

## 2024-06-12 PROCEDURE — 82947 ASSAY GLUCOSE BLOOD QUANT: CPT | Mod: 91

## 2024-06-12 PROCEDURE — 1200000002 HC GENERAL ROOM WITH TELEMETRY DAILY

## 2024-06-12 RX ADMIN — Medication 3 MG: at 20:30

## 2024-06-12 RX ADMIN — LEVETIRACETAM 750 MG: 500 TABLET, FILM COATED ORAL at 09:59

## 2024-06-12 RX ADMIN — CARVEDILOL 6.25 MG: 6.25 TABLET, FILM COATED ORAL at 16:46

## 2024-06-12 RX ADMIN — AMLODIPINE BESYLATE 5 MG: 5 TABLET ORAL at 09:59

## 2024-06-12 RX ADMIN — LEVETIRACETAM 750 MG: 500 TABLET, FILM COATED ORAL at 20:30

## 2024-06-12 RX ADMIN — APIXABAN 2.5 MG: 2.5 TABLET, FILM COATED ORAL at 09:59

## 2024-06-12 RX ADMIN — LOSARTAN POTASSIUM 50 MG: 50 TABLET, FILM COATED ORAL at 09:59

## 2024-06-12 RX ADMIN — APIXABAN 2.5 MG: 2.5 TABLET, FILM COATED ORAL at 20:30

## 2024-06-12 RX ADMIN — CARVEDILOL 6.25 MG: 6.25 TABLET, FILM COATED ORAL at 09:59

## 2024-06-12 RX ADMIN — ATORVASTATIN CALCIUM 40 MG: 40 TABLET, FILM COATED ORAL at 09:59

## 2024-06-12 RX ADMIN — PANTOPRAZOLE SODIUM 40 MG: 40 TABLET, DELAYED RELEASE ORAL at 06:18

## 2024-06-12 ASSESSMENT — COGNITIVE AND FUNCTIONAL STATUS - GENERAL
MOVING FROM LYING ON BACK TO SITTING ON SIDE OF FLAT BED WITH BEDRAILS: A LOT
PERSONAL GROOMING: A LITTLE
MOBILITY SCORE: 12
MOVING FROM LYING ON BACK TO SITTING ON SIDE OF FLAT BED WITH BEDRAILS: A LOT
MOBILITY SCORE: 12
HELP NEEDED FOR BATHING: A LOT
PERSONAL GROOMING: A LITTLE
DRESSING REGULAR UPPER BODY CLOTHING: A LITTLE
DRESSING REGULAR LOWER BODY CLOTHING: TOTAL
MOVING TO AND FROM BED TO CHAIR: A LOT
TOILETING: A LOT
MOVING TO AND FROM BED TO CHAIR: A LOT
WALKING IN HOSPITAL ROOM: A LOT
EATING MEALS: A LITTLE
TOILETING: A LOT
CLIMB 3 TO 5 STEPS WITH RAILING: A LOT
DRESSING REGULAR LOWER BODY CLOTHING: TOTAL
DAILY ACTIVITIY SCORE: 14
EATING MEALS: A LITTLE
WALKING IN HOSPITAL ROOM: A LOT
TURNING FROM BACK TO SIDE WHILE IN FLAT BAD: A LOT
TURNING FROM BACK TO SIDE WHILE IN FLAT BAD: A LOT
DRESSING REGULAR UPPER BODY CLOTHING: A LITTLE
DAILY ACTIVITIY SCORE: 14
STANDING UP FROM CHAIR USING ARMS: A LOT
STANDING UP FROM CHAIR USING ARMS: A LOT
CLIMB 3 TO 5 STEPS WITH RAILING: A LOT
HELP NEEDED FOR BATHING: A LOT

## 2024-06-12 ASSESSMENT — PAIN SCALES - GENERAL
PAINLEVEL_OUTOF10: 0 - NO PAIN

## 2024-06-12 NOTE — DISCHARGE SUMMARY
Discharge Diagnosis  Humerus fracture    Issues Requiring Follow-Up  Rehab    Test Results Pending At Discharge  Pending Labs       Order Current Status    Blood Culture Preliminary result    Blood Culture Preliminary result            Hospital Course  Patient with a past medical history significant for seizures chronic diastolic congestive heart failure history of CVA acid reflux hypertension dyslipidemia and DVT had a mechanical fall at home and was found on the floor  Workup revealed a humerus fracture  Patient was initially started on antibiotics for possible pneumonia but that came back negative  There was also concern for CVA which was also ruled out by neurology  Seen by orthopedics who recommended nonsurgical approach  They recommend follow-up with their clinic in 2 weeks  Recommend physical Occupational Therapy for strength and balance training  Will discharge to rehab facility in stable condition    Pertinent Physical Exam At Time of Discharge  Physical Exam    Constitutional   General appearance: Alert hard of hearing    Pulmonary   Respiratory assessment: No respiratory distress, normal respiratory rhythm and effort.    Auscultation of Lungs: Clear bilateral breath sounds.   Cardiovascular   Auscultation of heart: Apical pulse normal, heart rate and rhythm normal, normal S1 and S2, no murmurs and no pericardial rub.    Exam for edema: No peripheral edema.   Abdomen   Abdominal Exam: No bruits, normal bowel sounds, soft, non-tender, no abdominal mass palpated.    Liver and Spleen exam: No hepato-splenomegaly.   Musculoskeletal     Left arm in sling  Skin   Skin inspection: Normal skin color and pigmentation, normal skin turgor and no visible rash.   Neurologic   Cranial nerves: Nerves 2-12 were intact, no focal neuro defects.       Home Medications     Medication List      START taking these medications     losartan 50 mg tablet; Commonly known as: Cozaar; Take 1 tablet (50 mg)   by mouth once daily.      CONTINUE taking these medications     amLODIPine 5 mg tablet; Commonly known as: Norvasc   apixaban 2.5 mg tablet; Commonly known as: Eliquis; Take 1 tablet (2.5   mg) by mouth 2 times a day.   atorvastatin 40 mg tablet; Commonly known as: Lipitor; Take 1 tablet (40   mg) by mouth once daily.   biotin 5 mg capsule   carvedilol 6.25 mg tablet; Commonly known as: Coreg; take one tablet   every morning and evening.   cholecalciferol 25 MCG (1000 UT) tablet; Commonly known as: Vitamin D-3   furosemide 20 mg tablet; Commonly known as: Lasix; Take 0.5-1 tablets   (10-20 mg) by mouth once daily.   levETIRAcetam 750 mg tablet; Commonly known as: Keppra; Take 1 tablet   (750 mg) by mouth 2 times a day.   pantoprazole 20 mg EC tablet; Commonly known as: ProtoNix; Take 1 tablet   (20 mg) by mouth once daily.   potassium chloride CR 10 mEq ER tablet; Commonly known as: Klor-Con;   Take 1 tablet (10 mEq) by mouth every other day.   PreserVision AREDS 4,296 mcg-226 mg-90 mg capsule; Generic drug:   vitamins A,C,E-zinc-copper       Outpatient Follow-Up  Future Appointments   Date Time Provider Department [place]   9/4/2024  1:00 PM Estela Pelaez PA-C [id number] [facility]   4/28/2025  1:00 PM Randall Becker MD [id number] [facility]     Patient seen at bedside. Events from the last visit reviewed. Discussed with staff. Results of tests and investigations from last visit reviewed and discussed with patient/Family. Electronic chart on [facility] reviewed. Input / Recommendations  from consultants  appreciated and reviewed and agreed with.     discharge summary and profile completed. medications reviewed and discussed with patient and family.  scripts completed and signed.     total discharge time in excess of 30 minutes.    Chas Toro MD

## 2024-06-12 NOTE — PROGRESS NOTES
06/12/24 1643   Discharge Planning   Assistance Needed met with patient daughter Susie several times today at bedside, to follow up on discharge planning, daughter still concern about how patient will be able to tolerate rehab with non wtg bearing UE.daughter requested ortho surgery to answer questions about her mom's injury and prognosis. Follow up call x 2 3:36 to confirm discharge plan VM left daughter notified patient has an active discharge order, wanted to discuss her disposition appox 4pm met daughter Devorah and FREEMAN liaison  in the room to follow up on discharge plan, patient daughter still needed clarification about [facility] LOS,questions answered to the best of my ability, but patient daughter still unclear about plan, will follow in am 6/13/2024   Home or Post Acute Services Post acute facilities (Rehab/SNF/etc)   Type of Post Acute Facility Services Rehab;Skilled nursing   Patient expects to be discharged to: [facility] vs snf   Does the patient need discharge transport arranged? Yes   RoundTrip coordination needed? Yes   Has discharge transport been arranged? No     Nursing coordinator and nurse caring for patient patient is not leaving today

## 2024-06-12 NOTE — PROGRESS NOTES
Marian Fritz is a 89 y.o. female     Discussed with patient's family member at bedside  Explained in detail the plans going forward  Family member verbalizes understanding    Review of Systems     Constitutional: no fever, no chills,   Cardiovascular: no chest pain   Respiratory: no cough, wheezing or shortness of breath a  Gastrointestinal: no abdominal pain, no constipation, no melena, no nausea, no diarrhea, no vomiting and no blood in stools.   Neurological: no headache,   All other systems have been reviewed and are negative for complaint.       Vitals:    24 1154   BP: 154/72   Pulse: 61   Resp:    Temp: 36.2 °C (97.2 °F)   SpO2: 96%        Scheduled medications  amLODIPine, 5 mg, oral, Daily  apixaban, 2.5 mg, oral, BID  atorvastatin, 40 mg, oral, Daily  carvedilol, 6.25 mg, oral, BID  levETIRAcetam, 750 mg, oral, BID  losartan, 50 mg, oral, Daily  melatonin, 3 mg, oral, Nightly  pantoprazole, 40 mg, oral, Daily before breakfast   Or  pantoprazole, 40 mg, intravenous, Daily before breakfast      Continuous medications     PRN medications  PRN medications: acetaminophen, acetaminophen, [] hydrALAZINE **FOLLOWED BY** hydrALAZINE, ondansetron ODT **OR** ondansetron, oxygen    Lab Review   Results from last 7 days   Lab Units 24  0531 06/11/24  0619 06/10/24  0627   WBC AUTO x10*3/uL 9.7 11.3 11.4*   HEMOGLOBIN g/dL 12.4 13.8 12.9   HEMATOCRIT % 36.0 41.1 37.9   PLATELETS AUTO x10*3/uL 194 183 188     Results from last 7 days   Lab Units 24  0531 24  0619 06/10/24  0627 06/09/24  1640   SODIUM mmol/L 139 141 140 140   POTASSIUM mmol/L 3.9 3.6 3.7 3.6   CHLORIDE mmol/L 104 106 105 104   CO2 mmol/L 28 26 27 26   BUN mg/dL 22 23 32* 38*   CREATININE mg/dL 0.75 0.82 0.89 0.88   CALCIUM mg/dL 8.2* 8.1* 7.9* 8.5*   PROTEIN TOTAL g/dL  --   --  5.5* 6.2*   BILIRUBIN TOTAL mg/dL  --   --  0.8 1.0   ALK PHOS U/L  --   --  56 66   ALT U/L  --   --  23 27   AST U/L  --   --  14 17   GLUCOSE  mg/dL 133* 114* 106* 241*            Transthoracic Echo (TTE) Complete   Final Result      XR chest 1 view   Final Result   Mild perihilar interstitial prominence again seen.        MACRO:   None.        Signed by: Erin Cary 6/11/2024 12:48 PM   Dictation workstation:   [id number]      CT shoulder left wo IV contrast   Final Result   1. Acute oblique fracture through left humeral neck (predominantly   extending through anatomic neck of the humerus) superimposed on   remote fracture union of proximal humeral metadiaphysis as described   above. There is impaction of fracture fragments with anterior   angulation of fracture apex as described above.   2. Moderate volume glenohumeral joint effusion, likely favored to be   reactive. Linearly oriented calcifications in the expected region of   distal rotator cuff tendon could be related to hydroxyapatite   deposition versus sequela of chronic synovitis with associated   dystrophic calcifications.   3. Mild-to-moderate glenohumeral and moderate to severe   acromioclavicular joint arthrosis.   4. Findings concerning for age-indeterminate nondisplaced fractures   through lateral aspect of left 3rd through 5th ribs. Recommend   correlation with point tenderness in this location.             MACRO:   Critical Finding:  See findings. Notification was initiated on   6/10/2024 at 11:23 am by Dr. Sheridan Trent.  (**-YCF-**)        Signed by: Sheridan Trent 6/10/2024 11:23 AM   Dictation workstation:   [id number]      CT head wo IV contrast   Final Result   1. No evidence of acute intracranial abnormality.   2. Moderate to severe generalized volume loss and progression of   white matter hypodensities since 2022, consistent with advanced   microvascular ischemic disease. There are areas of encephalomalacia   consistent with infarcts, right parietal and left medial temporal   lobes; right parietal infarct not obviously present previously but   does not have acute features.  The left temporal lobe infarct has   evolved since prior. Left thalamic lacunar infarcts are new since   prior without acute features. If concern for acute infarct, MRI is   suggested. No hemorrhage.   3. No evidence of acute cervical spine fracture.   4. Multilevel severe cervical spondylosis, as above.                  Signed by: Betty Adams 6/9/2024 7:02 PM   Dictation workstation:   [id number]      CT cervical spine wo IV contrast   Final Result   1. No evidence of acute intracranial abnormality.   2. Moderate to severe generalized volume loss and progression of   white matter hypodensities since 2022, consistent with advanced   microvascular ischemic disease. There are areas of encephalomalacia   consistent with infarcts, right parietal and left medial temporal   lobes; right parietal infarct not obviously present previously but   does not have acute features. The left temporal lobe infarct has   evolved since prior. Left thalamic lacunar infarcts are new since   prior without acute features. If concern for acute infarct, MRI is   suggested. No hemorrhage.   3. No evidence of acute cervical spine fracture.   4. Multilevel severe cervical spondylosis, as above.                  Signed by: Betty Adams 6/9/2024 7:02 PM   Dictation workstation:   [id number]      XR pelvis 1-2 views   Final Result   No acute bony abnormalities on x-ray examination of the AP pelvis.   Signed by Trina Roth, DO      XR chest 1 view   Final Result   Multilobar subtle patchy airspace opacities.  Please correlate for   pneumonia.   Signed by Darrel Robles, DO      XR shoulder right 2+ views   Final Result   Degenerative changes without acute fracture or malalignment.    Narrowing of subacromial space suggesting rotator cuff pathology.    Calcific tendinopathy of the supraspinatus tendon.   Signed by Darrel Robles, DO      XR shoulder left 2+ views   Final Result   Chronic deformity of the proximal humerus without acute fracture or    malalignment.  Degenerative changes.   Signed by Darrel Robles,             Physical Exam     Constitutional   General appearance: Alert and in no acute distress.     Pulmonary   Respiratory assessment: No respiratory distress, normal respiratory rhythm and effort.    Auscultation of Lungs: Clear bilateral breath sounds.   Cardiovascular   Auscultation of heart: Apical pulse normal, heart rate and rhythm normal, normal S1 and S2, no murmurs and no pericardial rub.    Exam for edema: No peripheral edema.   Abdomen   Abdominal Exam: No bruits, normal bowel sounds, soft, non-tender, no abdominal mass palpated.    Liver and Spleen exam: No hepato-splenomegaly.   Musculoskeletal   Examination of gait: ROM intact  Skin inspection: Normal skin color and pigmentation, normal skin turgor and no visible rash.   Neurologic   Cranial nerves: Nerves 2-12 were intact, no focal neuro defects.     Assessment/Plan          #Left humeral oblique fracture s/p fall at home  Seen by orthopedics  Nonsurgical approach  Orthopedics will see the patient in 2 weeks for follow-up  Continue pain control  Medically cleared for discharge to acute rehab        #Hypertension  Resume home medications      #Dyslipidemia  Continue statins with a target LDL less than 70    #Atrial fibrillation  Rate controlled  Continue Eliquis    #Seizure disorder  Stable on Keppra

## 2024-06-13 VITALS
WEIGHT: 104.94 LBS | HEIGHT: 58 IN | OXYGEN SATURATION: 98 % | BODY MASS INDEX: 22.03 KG/M2 | SYSTOLIC BLOOD PRESSURE: 160 MMHG | DIASTOLIC BLOOD PRESSURE: 69 MMHG | HEART RATE: 63 BPM | RESPIRATION RATE: 18 BRPM | TEMPERATURE: 97.3 F

## 2024-06-13 PROBLEM — Z74.09 IMPAIRED MOBILITY AND ACTIVITIES OF DAILY LIVING: Status: ACTIVE | Noted: 2024-06-13
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
## 2024-06-13 PROBLEM — Z78.9 IMPAIRED MOBILITY AND ACTIVITIES OF DAILY LIVING: Status: ACTIVE | Noted: 2024-06-13

## 2024-06-13 PROBLEM — S42.212A FX HUMERAL NECK, LEFT, CLOSED, INITIAL ENCOUNTER: Status: ACTIVE | Noted: 2024-06-13
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
## 2024-06-13 LAB
ANION GAP SERPL CALC-SCNC: 12 MMOL/L (ref 10–20)
BACTERIA BLD CULT: NORMAL
BACTERIA BLD CULT: NORMAL
BUN SERPL-MCNC: 24 MG/DL (ref 6–23)
CALCIUM SERPL-MCNC: 8.5 MG/DL (ref 8.6–10.3)
CHLORIDE SERPL-SCNC: 106 MMOL/L (ref 98–107)
CO2 SERPL-SCNC: 26 MMOL/L (ref 21–32)
CREAT SERPL-MCNC: 0.66 MG/DL (ref 0.5–1.05)
EGFRCR SERPLBLD CKD-EPI 2021: 80 ML/MIN/1.73M*2
ERYTHROCYTE [DISTWIDTH] IN BLOOD BY AUTOMATED COUNT: 12.3 % (ref 11.5–14.5)
GLUCOSE BLD MANUAL STRIP-MCNC: 125 MG/DL (ref 74–99)
GLUCOSE SERPL-MCNC: 135 MG/DL (ref 74–99)
HCT VFR BLD AUTO: 36.6 % (ref 36–46)
HGB BLD-MCNC: 13.1 G/DL (ref 12–16)
HOLD SPECIMEN: NORMAL
MCH RBC QN AUTO: 32.4 PG (ref 26–34)
MCHC RBC AUTO-ENTMCNC: 35.8 G/DL (ref 32–36)
MCV RBC AUTO: 91 FL (ref 80–100)
NRBC BLD-RTO: 0 /100 WBCS (ref 0–0)
PLATELET # BLD AUTO: 222 X10*3/UL (ref 150–450)
POTASSIUM SERPL-SCNC: 4 MMOL/L (ref 3.5–5.3)
RBC # BLD AUTO: 4.04 X10*6/UL (ref 4–5.2)
SODIUM SERPL-SCNC: 140 MMOL/L (ref 136–145)
WBC # BLD AUTO: 8.7 X10*3/UL (ref 4.4–11.3)

## 2024-06-13 PROCEDURE — 2500000001 HC RX 250 WO HCPCS SELF ADMINISTERED DRUGS (ALT 637 FOR MEDICARE OP): Performed by: HOSPITALIST

## 2024-06-13 PROCEDURE — 85027 COMPLETE CBC AUTOMATED: CPT | Performed by: NURSE PRACTITIONER

## 2024-06-13 PROCEDURE — 82374 ASSAY BLOOD CARBON DIOXIDE: CPT | Performed by: NURSE PRACTITIONER

## 2024-06-13 PROCEDURE — 82947 ASSAY GLUCOSE BLOOD QUANT: CPT

## 2024-06-13 PROCEDURE — 36415 COLL VENOUS BLD VENIPUNCTURE: CPT | Performed by: NURSE PRACTITIONER

## 2024-06-13 PROCEDURE — 99239 HOSP IP/OBS DSCHRG MGMT >30: CPT | Performed by: INTERNAL MEDICINE

## 2024-06-13 PROCEDURE — 2500000001 HC RX 250 WO HCPCS SELF ADMINISTERED DRUGS (ALT 637 FOR MEDICARE OP): Performed by: INTERNAL MEDICINE

## 2024-06-13 PROCEDURE — 99222 1ST HOSP IP/OBS MODERATE 55: CPT | Performed by: PHYSICAL MEDICINE & REHABILITATION

## 2024-06-13 RX ADMIN — CARVEDILOL 6.25 MG: 6.25 TABLET, FILM COATED ORAL at 08:00

## 2024-06-13 RX ADMIN — AMLODIPINE BESYLATE 5 MG: 5 TABLET ORAL at 09:17

## 2024-06-13 RX ADMIN — ATORVASTATIN CALCIUM 40 MG: 40 TABLET, FILM COATED ORAL at 09:17

## 2024-06-13 RX ADMIN — ACETAMINOPHEN 650 MG: 325 TABLET ORAL at 09:23

## 2024-06-13 RX ADMIN — LOSARTAN POTASSIUM 50 MG: 50 TABLET, FILM COATED ORAL at 09:17

## 2024-06-13 RX ADMIN — PANTOPRAZOLE SODIUM 40 MG: 40 TABLET, DELAYED RELEASE ORAL at 09:17

## 2024-06-13 RX ADMIN — LEVETIRACETAM 750 MG: 500 TABLET, FILM COATED ORAL at 09:17

## 2024-06-13 RX ADMIN — APIXABAN 2.5 MG: 2.5 TABLET, FILM COATED ORAL at 09:17

## 2024-06-13 ASSESSMENT — PAIN - FUNCTIONAL ASSESSMENT
PAIN_FUNCTIONAL_ASSESSMENT: 0-10
PAIN_FUNCTIONAL_ASSESSMENT: 0-10

## 2024-06-13 ASSESSMENT — COGNITIVE AND FUNCTIONAL STATUS - GENERAL
EATING MEALS: A LITTLE
DRESSING REGULAR LOWER BODY CLOTHING: TOTAL
DAILY ACTIVITIY SCORE: 14
PERSONAL GROOMING: A LITTLE
TOILETING: A LOT
STANDING UP FROM CHAIR USING ARMS: A LOT
WALKING IN HOSPITAL ROOM: A LOT
MOVING TO AND FROM BED TO CHAIR: A LOT
MOVING FROM LYING ON BACK TO SITTING ON SIDE OF FLAT BED WITH BEDRAILS: A LOT
DRESSING REGULAR UPPER BODY CLOTHING: A LITTLE
HELP NEEDED FOR BATHING: A LOT
TURNING FROM BACK TO SIDE WHILE IN FLAT BAD: A LOT
CLIMB 3 TO 5 STEPS WITH RAILING: A LOT
MOBILITY SCORE: 12

## 2024-06-13 ASSESSMENT — PAIN SCALES - GENERAL
PAINLEVEL_OUTOF10: 0 - NO PAIN
PAINLEVEL_OUTOF10: 0 - NO PAIN
PAINLEVEL_OUTOF10: 3

## 2024-06-13 ASSESSMENT — PAIN SCALES - PAIN ASSESSMENT IN ADVANCED DEMENTIA (PAINAD): TOTALSCORE: MEDICATION (SEE MAR)

## 2024-06-13 ASSESSMENT — PAIN DESCRIPTION - ORIENTATION: ORIENTATION: LEFT

## 2024-06-13 NOTE — PROGRESS NOTES
06/13/24 1430   Discharge Planning   Assistance Needed met patient daughter Susie at bedside to follow up with discharge planning, rounded w/Care Transitions Supervisor parient [facility], [facility] services provided, daughter appreciatve, also questions answered about what patient should wear for rehab   Patient expects to be discharged to: [facility]

## 2024-06-13 NOTE — PROGRESS NOTES
6/13/2024 10:40 AM Daughter informed of 1:00 Pm discharge transport time to  [facility]

## 2024-06-13 NOTE — CARE PLAN
The patient's goals for the shift include      The clinical goals for the shift include pt will remain hemodynamically stable throughout shift        Problem: Safety - Adult  Goal: Free from fall injury  Outcome: Progressing     Problem: Discharge Planning  Goal: Discharge to home or other facility with appropriate resources  Outcome: Progressing     Problem: Chronic Conditions and Co-morbidities  Goal: Patient's chronic conditions and co-morbidity symptoms are monitored and maintained or improved  Outcome: Progressing     Problem: Skin  Goal: Decreased wound size/increased tissue granulation at next dressing change  Outcome: Progressing  Goal: Participates in plan/prevention/treatment measures  Outcome: Progressing  Goal: Prevent/manage excess moisture  Outcome: Progressing  Goal: Prevent/minimize sheer/friction injuries  Outcome: Progressing  Goal: Promote/optimize nutrition  Outcome: Progressing  Goal: Promote skin healing  Outcome: Progressing

## 2024-06-13 NOTE — PROGRESS NOTES
Physical Therapy                 Therapy Communication Note    Patient Name: Marian Fritz  MRN: [id number]  Today's Date: 6/13/2024     Discipline: Physical Therapy    Missed Visit Reason: Missed Visit Reason:  (per RN pt is being picked up by transport currently.)    Missed Time: Attempt    Comment:

## 2024-06-14 ENCOUNTER — LAB REQUISITION (OUTPATIENT)
Dept: LAB | Facility: HOSPITAL | Age: 89
End: 2024-06-14
Payer: MEDICARE

## 2024-06-15 LAB
ATRIAL RATE: 61 BPM
PR INTERVAL: 178 MS
Q ONSET: 190 MS
QRS COUNT: 10 BEATS
QRS DURATION: 166 MS
QT INTERVAL: 446 MS
QTC CALCULATION(BAZETT): 448 MS
QTC FREDERICIA: 448 MS
R AXIS: -80 DEGREES
T AXIS: 89 DEGREES
T OFFSET: 413 MS
VENTRICULAR RATE: 61 BPM

## 2024-06-17 ENCOUNTER — LAB REQUISITION (OUTPATIENT)
Dept: LAB | Facility: HOSPITAL | Age: 89
End: 2024-06-17
Payer: MEDICARE

## 2024-06-25 ENCOUNTER — APPOINTMENT (OUTPATIENT)
Dept: PRIMARY CARE | Facility: CLINIC | Age: 89
End: 2024-06-25
Payer: MEDICARE

## 2024-06-25 ENCOUNTER — LAB REQUISITION (OUTPATIENT)
Dept: LAB | Facility: HOSPITAL | Age: 89
End: 2024-06-25

## 2024-06-28 ENCOUNTER — APPOINTMENT (OUTPATIENT)
Dept: ORTHOPEDIC SURGERY | Facility: CLINIC | Age: 89
End: 2024-06-28
Payer: MEDICARE

## 2024-06-28 ENCOUNTER — HOSPITAL ENCOUNTER (OUTPATIENT)
Dept: RADIOLOGY | Facility: CLINIC | Age: 89
Discharge: HOME | End: 2024-06-28
Payer: MEDICARE

## 2024-06-28 VITALS — WEIGHT: 104 LBS | HEIGHT: 60 IN | BODY MASS INDEX: 20.42 KG/M2

## 2024-06-28 DIAGNOSIS — M25.512 LEFT SHOULDER PAIN, UNSPECIFIED CHRONICITY: ICD-10-CM

## 2024-06-28 DIAGNOSIS — Z74.09 IMPAIRED MOBILITY AND ACTIVITIES OF DAILY LIVING: ICD-10-CM

## 2024-06-28 DIAGNOSIS — S42.202A CLOSED FRACTURE OF PROXIMAL END OF LEFT HUMERUS, INITIAL ENCOUNTER: Primary | ICD-10-CM

## 2024-06-28 DIAGNOSIS — Z78.9 IMPAIRED MOBILITY AND ACTIVITIES OF DAILY LIVING: ICD-10-CM

## 2024-06-28 DIAGNOSIS — Z75.8 DISCHARGE PLANNING ISSUES: ICD-10-CM

## 2024-06-28 PROCEDURE — 73030 X-RAY EXAM OF SHOULDER: CPT | Mod: LT

## 2024-06-28 PROCEDURE — L3670 SO ACRO/CLAV CAN WEB PRE OTS: HCPCS | Performed by: STUDENT IN AN ORGANIZED HEALTH CARE EDUCATION/TRAINING PROGRAM

## 2024-06-28 NOTE — PROGRESS NOTES
CHIEF COMPLAINT: No chief complaint on file.      History: 89 y.o. female presents to the office today for a follow up on a left humerus fracture that happened on 06-09-24.  She lives at home with her daughter, Susie who is here with her today.  She is currently staying at the acute care rehab at [facility].  She is wearing her sling.  She states that she is not having significant pain in the shoulder.  Prior to this, she was able to do her daily activities with the left shoulder.  Denies numbness or tingling.    Past medical history, past surgical history, medications, allergies, family history, social history, and review of systems were reviewed today.    A 12 point review of systems was negative other than as stated in the HPI.    Past Medical History:   Diagnosis Date    Personal history of other diseases of the circulatory system     History of hypertension    Personal history of other diseases of the nervous system and sense organs 12/16/2013    History of complex partial epilepsy    Pure hypercholesterolemia, unspecified     High cholesterol    Unspecified cataract     Cataracts, both eyes    Unspecified convulsions (Multi)     Seizures        Allergies   Allergen Reactions    Bee Venom Protein (Honey Bee) Other        Past Surgical History:   Procedure Laterality Date    CATARACT EXTRACTION  12/01/2014    Cataract Surgery    CT ANGIO NECK  9/16/2018    CT NECK ANGIO W AND WO IV CONTRAST 9/16/2018 [facility] EMERGENCY LEGACY    CT HEAD ANGIO W AND WO IV CONTRAST  9/16/2018    CT HEAD ANGIO W AND WO IV CONTRAST 9/16/2018 [facility] EMERGENCY LEGACY    MR HEAD ANGIO WO IV CONTRAST  11/28/2018    MR HEAD ANGIO WO IV CONTRAST 11/28/2018 [facility] INPATIENT LEGACY    MR NECK ANGIO WO IV CONTRAST  11/28/2018    MR NECK ANGIO WO IV CONTRAST 11/28/2018 [facility] INPATIENT LEGACY    OTHER SURGICAL HISTORY  12/01/2014    Simple Mastectomy Right Breast        Family History   Problem Relation Name Age of Onset    Heart failure Mother       Tracheoesophageal fistual Father      Pancreatic cancer Sister      Coronary artery disease Brother      Pancreatic cancer Brother          Social History     Socioeconomic History    Marital status:      Spouse name: Not on file    Number of children: Not on file    Years of education: Not on file    Highest education level: Not on file   Occupational History    Not on file   Tobacco Use    Smoking status: Never    Smokeless tobacco: Never   Vaping Use    Vaping status: Never Used   Substance and Sexual Activity    Alcohol use: Never    Drug use: Never    Sexual activity: Not on file   Other Topics Concern    Not on file   Social History Narrative    Not on file     Social Determinants of Health     Financial Resource Strain: Low Risk  (6/10/2024)    Overall Financial Resource Strain (CARDIA)     Difficulty of Paying Living Expenses: Not very hard   Food Insecurity: Not on file   Transportation Needs: No Transportation Needs (6/10/2024)    PRAPARE - Transportation     Lack of Transportation (Medical): No     Lack of Transportation (Non-Medical): No   Physical Activity: Not on file   Stress: Not on file   Social Connections: Not on file   Intimate Partner Violence: Not on file   Housing Stability: Low Risk  (6/10/2024)    Housing Stability Vital Sign     Unable to Pay for Housing in the Last Year: No     Number of Places Lived in the Last Year: 1     Unstable Housing in the Last Year: No        CURRENT MEDICATIONS:   Current Outpatient Medications   Medication Sig Dispense Refill    amLODIPine (Norvasc) 5 mg tablet Take 1 tablet (5 mg) by mouth once daily.      apixaban (Eliquis) 2.5 mg tablet Take 1 tablet (2.5 mg) by mouth 2 times a day. 180 tablet 3    atorvastatin (Lipitor) 40 mg tablet Take 1 tablet (40 mg) by mouth once daily. 90 tablet 3    biotin 5 mg capsule Take by mouth.      carvedilol (Coreg) 6.25 mg tablet take one tablet every morning and evening. 180 tablet 3    cholecalciferol (Vitamin D-3)  25 MCG (1000 UT) tablet Take 1 tablet (25 mcg) by mouth once daily.      furosemide (Lasix) 20 mg tablet Take 0.5-1 tablets (10-20 mg) by mouth once daily. 45 tablet 2    levETIRAcetam (Keppra) 750 mg tablet Take 1 tablet (750 mg) by mouth 2 times a day. 180 tablet 3    losartan (Cozaar) 50 mg tablet Take 1 tablet (50 mg) by mouth once daily. 90 tablet 3    pantoprazole (ProtoNix) 20 mg EC tablet Take 1 tablet (20 mg) by mouth once daily. 90 tablet 3    potassium chloride CR 10 mEq ER tablet Take 1 tablet (10 mEq) by mouth every other day. 45 tablet 3    vitamins A,C,E-zinc-copper (PreserVision AREDS) 4,296 mcg-226 mg-90 mg capsule Take 1 capsule by mouth twice a day.       No current facility-administered medications for this visit.       Physical Examination:      6/12/2024     8:26 AM 6/12/2024    11:54 AM 6/12/2024     4:25 PM 6/12/2024     7:27 PM 6/12/2024    11:38 PM 6/13/2024     7:29 AM 6/13/2024     1:14 PM   Vitals   Systolic 154 154 150 167 142 148 160   Diastolic 62 72 73 74 77 65 69   Heart Rate 58 61 60 63 63  63   Temp 36.6 °C (97.8 °F) 36.2 °C (97.2 °F) 36.5 °C (97.7 °F) 36.9 °C (98.4 °F) 36.9 °C (98.5 °F) 36.7 °C (98.1 °F) 36.3 °C (97.3 °F)   Resp    20 18  18      There is no height or weight on file to calculate BMI.    Well-appearing, appears stated age, pleasant and cooperative, appropriate mood and behavior. Height and weight reviewed. Alert and oriented x3.  Auditory function intact.  No acute distress.  Intact ocular function, АЛЕКСАНДР, EOMI. Breathing is unlabored .  There is no evidence of jugular venous distension. Skin appearance is normal without evidence of rash or other lesions. 2+ radial pulses bilaterally, fingers pink and wwp, good capillary refill, no pitting edema. No appreciable lymphadenopathy in bilateral upper extremities. SILT throughout both upper extremities, median/radial/ulnar/musculocutaneous/axillary nerve motor and sensory intact (except for abnormalities noted in  focused musculoskeletal exam section below).     Neck exam:   Full range of motion of the neck in flexion/extension and rotational movements. No significant areas of tenderness to palpation in the neck.    On exam of bilateral upper extremities, Tenderness palpation about the left shoulder.  Range of motion of the left shoulder was deferred secondary to fracture.  Full range of motion of the elbow wrist and fingers.  Neurovascular intact.    Imaging: Radiographs of the left shoulder were performed today.  First interpreted by myself.  There is a left proximal humerus fracture involving the calcar.  Overall the alignment of the proximal humerus is maintained.  There is some chronic degenerative changes as well.    Assessment: Left proximal humerus fracture    Plan: On discussion with the patient and family about treatment options and diagnosis.  The patient does have a left proximal humerus fracture.  Discussed that given the alignment of her fracture, I do think this is amenable for nonoperative management.  We are about 3 weeks out from the injury.  We need to keep her immobilized for about 2 more weeks, then she can start using the left shoulder as tolerated.  We did give her a new sling today, as the previous 1 was missed fitting.  I did provide a note from the nursing facility discussing her care.  Will see him back in about 2 weeks with new left shoulder x-rays.         Dragon software was used to dictate this note, please be aware that minor errors in transcription may be present.    Sahil Evans MD    Shoulder/Elbow Surgery  [facility]

## 2024-06-28 NOTE — LETTER
June 28, 2024     Patient: Marian Fritz   YOB: 1926   Date of Visit: 6/28/2024       To Whom It May Concern:    The patient has a left proximal humerus fracture.  We will need to keep her immobilized in a sling for the next 2 weeks, and then we will reevaluate her at that time and likely remove our restrictions.  In the meantime, she can work with physical therapy on ambulation, lower leg strengthening, and she can move her elbow wrist and fingers on the left arm as tolerated, but no shoulder motion.    Will see her in clinic in about 2 weeks and at that time, as long as radiographs are stable, we will advance her to allow for range of motion and weightbearing on the left arm.    If you have any questions or concerns, please don't hesitate to call.         Sincerely,        Sahil Evans MD    CC: No Recipients

## 2024-07-01 ENCOUNTER — NURSING HOME VISIT (OUTPATIENT)
Dept: POST ACUTE CARE | Facility: EXTERNAL LOCATION | Age: 89
End: 2024-07-01
Payer: MEDICARE

## 2024-07-01 DIAGNOSIS — H91.93 BILATERAL HEARING LOSS, UNSPECIFIED HEARING LOSS TYPE: ICD-10-CM

## 2024-07-01 DIAGNOSIS — E78.5 HYPERLIPIDEMIA, UNSPECIFIED HYPERLIPIDEMIA TYPE: ICD-10-CM

## 2024-07-01 DIAGNOSIS — I50.33 ACUTE ON CHRONIC DIASTOLIC CONGESTIVE HEART FAILURE (MULTI): ICD-10-CM
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
## 2024-07-01 DIAGNOSIS — S42.412D CLOSED SUPRACONDYLAR FRACTURE OF LEFT HUMERUS WITH ROUTINE HEALING, SUBSEQUENT ENCOUNTER: ICD-10-CM
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
## 2024-07-01 DIAGNOSIS — Z95.0 PACEMAKER: ICD-10-CM

## 2024-07-01 DIAGNOSIS — I10 BENIGN ESSENTIAL HTN: ICD-10-CM

## 2024-07-01 DIAGNOSIS — I48.91 ATRIAL FIBRILLATION, UNSPECIFIED TYPE (MULTI): ICD-10-CM

## 2024-07-01 DIAGNOSIS — Z85.3 PERSONAL HISTORY OF MALIGNANT NEOPLASM OF BREAST: ICD-10-CM

## 2024-07-01 DIAGNOSIS — Z86.73 HISTORY OF CVA IN ADULTHOOD: ICD-10-CM

## 2024-07-01 PROCEDURE — 99305 1ST NF CARE MODERATE MDM 35: CPT | Performed by: INTERNAL MEDICINE

## 2024-07-01 NOTE — LETTER
"Patient: Marian Fritz  : 1926    Encounter Date: 2024    Nursing Home  History & Physical Visit    Name: Marian Fritz  MRN: [id number]  YOB: 1926  Date of Service: 2024      History of Present Illness  Pt seen , records reviewed. She is here for rehab after having fall at home leading to L humerus fracture. Ortho surggested non surgical approach . She is NWB on L arm . She is here for further rehab  She is very [place] , hx of CHF, htn, hld, a fib, pacemaker etc.   She lives with daughter   As per [facility] discharge:  ---------------  \"Hospital Course  Patient with a past medical history significant for seizures chronic diastolic congestive heart failure history of CVA acid reflux hypertension dyslipidemia and DVT had a mechanical fall at home and was found on the floor  Workup revealed a humerus fracture  Patient was initially started on antibiotics for possible pneumonia but that came back negative  There was also concern for CVA which was also ruled out by neurology  Seen by orthopedics who recommended nonsurgical approach  They recommend follow-up with their clinic in 2 weeks  Recommend physical Occupational Therapy for strength and balance training  Will discharge to rehab facility in stable condition\"  ---------------  She went to rehab with  after her discharge from [facility] and from there now she is here for further rehab      Review of Systems  REVIEW OF SYSTEMS:   All other systems have been reviewed and are negative in relation to patient's complaint and other than what is mentioned in History of present illness.     Vital Signs  Temperature  98.0 °F  2024 13:14  Pulse  60 per minute  2024 13:14  Respirations  16 per minute  2024 13:14  Blood Pressure  111 / 60 mmHg  2024 13:14  O2 Saturation  96 %  2024 13:14  Height  5ft 0.0in  2024 00:33  Physical Exam  Vitals noted   Not in acute distress  Conj Pink, No icterus  She is [place]   Has sling in L " arm for her fracture  Neck: No Cervical LN enlargement, No Thyroid enlargement   Lungs: good air entry bilaterally, no rales or rhonchi  CVS: S1 S2 + , no S3. No loud heart murmur heard.   Abdomen: Soft, non tender , BS +. no organomegaly , no CVA tenderness  CNS: Pt is alert, moving all 4 extremities. no motor weakness or abnormal movements noted on gross examination.  No spine tenderness  Extremities: No edema, No calf tenderness, Kamryn's sign negative.     Assessment/Plan:    1. Fall in home, subsequent encounter    2. Closed supracondylar fracture of left humerus with routine healing, subsequent encounter -NWB on L arm , no surgery done   3. Bilateral hearing loss, unspecified hearing loss type    4. Benign essential HTN -controlled     5. History of CVA in adulthood    6. Atrial fibrillation, unspecified type (Multi) has pacemaker    7. Acute on chronic diastolic congestive heart failure (Multi) -stable   8. Hyperlipidemia, unspecified hyperlipidemia type -hx of   9. Pacemaker         Plan:     Available medical records reviewed . Patient was examined and detailed History and physical done . All questions answered to patient's satisfaction . Total time for chart reviewing , detailed examination and coordinating care with patient was > 25 minutes.     Patient is doing well.   Continue OT/PT Rehab.   She uses cane for ambulation   Discussed with PT about her rapport  Current medications are effective. advised to continue current medications.  Will continue to follow   Patient felt satisfied with plan      Electronically Signed By: Pantera Cotto MD   7/1/24 10:17 PM

## 2024-07-02 NOTE — PROGRESS NOTES
"Nursing Home  History & Physical Visit    Name: Marian Fritz  MRN: [id number]  YOB: 1926  Date of Service: 7/1/2024      History of Present Illness  Pt seen , records reviewed. She is here for rehab after having fall at home leading to L humerus fracture. Ortho surggested non surgical approach . She is NWB on L arm . She is here for further rehab  She is very [place] , hx of CHF, htn, hld, a fib, pacemaker etc.   She lives with daughter   As per [facility] discharge:  ---------------  \"Hospital Course  Patient with a past medical history significant for seizures chronic diastolic congestive heart failure history of CVA acid reflux hypertension dyslipidemia and DVT had a mechanical fall at home and was found on the floor  Workup revealed a humerus fracture  Patient was initially started on antibiotics for possible pneumonia but that came back negative  There was also concern for CVA which was also ruled out by neurology  Seen by orthopedics who recommended nonsurgical approach  They recommend follow-up with their clinic in 2 weeks  Recommend physical Occupational Therapy for strength and balance training  Will discharge to rehab facility in stable condition\"  ---------------  She went to rehab with  after her discharge from [facility] and from there now she is here for further rehab      Review of Systems  REVIEW OF SYSTEMS:   All other systems have been reviewed and are negative in relation to patient's complaint and other than what is mentioned in History of present illness.     Vital Signs  Temperature  98.0 °F  07/01/2024 13:14  Pulse  60 per minute  07/01/2024 13:14  Respirations  16 per minute  07/01/2024 13:14  Blood Pressure  111 / 60 mmHg  07/01/2024 13:14  O2 Saturation  96 %  07/01/2024 13:14  Height  5ft 0.0in  07/01/2024 00:33  Physical Exam  Vitals noted   Not in acute distress  Conj Pink, No icterus  She is [place]   Has sling in L arm for her fracture  Neck: No Cervical LN enlargement, No Thyroid " enlargement   Lungs: good air entry bilaterally, no rales or rhonchi  CVS: S1 S2 + , no S3. No loud heart murmur heard.   Abdomen: Soft, non tender , BS +. no organomegaly , no CVA tenderness  CNS: Pt is alert, moving all 4 extremities. no motor weakness or abnormal movements noted on gross examination.  No spine tenderness  Extremities: No edema, No calf tenderness, Kamryn's sign negative.     Assessment/Plan:    1. Fall in home, subsequent encounter    2. Closed supracondylar fracture of left humerus with routine healing, subsequent encounter -NWB on L arm , no surgery done   3. Bilateral hearing loss, unspecified hearing loss type    4. Benign essential HTN -controlled     5. History of CVA in adulthood    6. Atrial fibrillation, unspecified type (Multi) has pacemaker    7. Acute on chronic diastolic congestive heart failure (Multi) -stable   8. Hyperlipidemia, unspecified hyperlipidemia type -hx of   9. Pacemaker         Plan:     Available medical records reviewed . Patient was examined and detailed History and physical done . All questions answered to patient's satisfaction . Total time for chart reviewing , detailed examination and coordinating care with patient was > 25 minutes.     Patient is doing well.   Continue OT/PT Rehab.   She uses cane for ambulation   Discussed with PT about her rapport  Current medications are effective. advised to continue current medications.  Will continue to follow   Patient felt satisfied with plan

## 2024-07-08 ENCOUNTER — NURSING HOME VISIT (OUTPATIENT)
Dept: POST ACUTE CARE | Facility: EXTERNAL LOCATION | Age: 89
End: 2024-07-08
Payer: MEDICARE

## 2024-07-08 DIAGNOSIS — S42.412D CLOSED SUPRACONDYLAR FRACTURE OF LEFT HUMERUS WITH ROUTINE HEALING, SUBSEQUENT ENCOUNTER: ICD-10-CM

## 2024-07-08 DIAGNOSIS — H91.93 BILATERAL HEARING LOSS, UNSPECIFIED HEARING LOSS TYPE: ICD-10-CM

## 2024-07-08 DIAGNOSIS — I50.9 CHRONIC CONGESTIVE HEART FAILURE, UNSPECIFIED HEART FAILURE TYPE (MULTI): ICD-10-CM

## 2024-07-08 DIAGNOSIS — Z86.73 HISTORY OF CVA IN ADULTHOOD: ICD-10-CM
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
## 2024-07-08 DIAGNOSIS — I10 BENIGN ESSENTIAL HTN: ICD-10-CM

## 2024-07-08 PROCEDURE — 99308 SBSQ NF CARE LOW MDM 20: CPT | Performed by: INTERNAL MEDICINE

## 2024-07-08 NOTE — LETTER
Patient: Marian Fritz  : 1926    Encounter Date: 2024        Nursing home follow up visit  Name: Marian Fritz  MRN: [id number]  YOB: 1926  Date of Service: 2024      Pt was evaluated during nursing home visit today  Patient is doing OK. Participating in therapy well  No sob, cp, PND, orthopnea  Eating ok, sleeping ok   Moving BM ok.   Tolerating medications well    Objective :  Vitals were noted, very [place]  has sling on L arm .   Patient is not any acute distress  Conjunctiva- Pink, no icterus   No cervical LN enlargement   Lungs clear, s1s2 +   No edema , No calf tenderness     Assessment:    1. Fall in home, subsequent encounter    2. Closed supracondylar fracture of left humerus with routine healing, subsequent encounter -NWB except during thearpy    3. Bilateral hearing loss, unspecified hearing loss type    4. Benign essential HTN -hx of    5. History of CVA in adulthood    6. Chronic congestive heart failure, unspecified heart failure type (Multi) -stable        Plan:  Patient is doing well.   Continue OT/PT Rehab.   Current medications are effective. advised to continue current medications.  Will continue to follow   Patient felt satisfied with plan            Electronically Signed By: Pantera Cotto MD   24  6:22 PM

## 2024-07-08 NOTE — PROGRESS NOTES
Nursing home follow up visit  Name: Marian Fritz  MRN: [id number]  YOB: 1926  Date of Service: 7/8/2024      Pt was evaluated during nursing home visit today  Patient is doing OK. Participating in therapy well  No sob, cp, PND, orthopnea  Eating ok, sleeping ok   Moving BM ok.   Tolerating medications well    Objective :  Vitals were noted, very [place]  has sling on L arm .   Patient is not any acute distress  Conjunctiva- Pink, no icterus   No cervical LN enlargement   Lungs clear, s1s2 +   No edema , No calf tenderness     Assessment:    1. Fall in home, subsequent encounter    2. Closed supracondylar fracture of left humerus with routine healing, subsequent encounter -NWB except during thearpy    3. Bilateral hearing loss, unspecified hearing loss type    4. Benign essential HTN -hx of    5. History of CVA in adulthood    6. Chronic congestive heart failure, unspecified heart failure type (Multi) -stable        Plan:  Patient is doing well.   Continue OT/PT Rehab.   Current medications are effective. advised to continue current medications.  Will continue to follow   Patient felt satisfied with plan

## 2024-07-10 ENCOUNTER — HOSPITAL ENCOUNTER (OUTPATIENT)
Dept: RADIOLOGY | Facility: CLINIC | Age: 89
Discharge: HOME | End: 2024-07-10
Payer: MEDICARE

## 2024-07-10 ENCOUNTER — APPOINTMENT (OUTPATIENT)
Dept: ORTHOPEDIC SURGERY | Facility: CLINIC | Age: 89
End: 2024-07-10
Payer: MEDICARE

## 2024-07-10 DIAGNOSIS — M25.519 SHOULDER PAIN, UNSPECIFIED CHRONICITY, UNSPECIFIED LATERALITY: Primary | ICD-10-CM

## 2024-07-10 DIAGNOSIS — M25.519 SHOULDER PAIN, UNSPECIFIED CHRONICITY, UNSPECIFIED LATERALITY: ICD-10-CM

## 2024-07-10 PROCEDURE — 1111F DSCHRG MED/CURRENT MED MERGE: CPT | Performed by: STUDENT IN AN ORGANIZED HEALTH CARE EDUCATION/TRAINING PROGRAM

## 2024-07-10 PROCEDURE — 99213 OFFICE O/P EST LOW 20 MIN: CPT | Performed by: STUDENT IN AN ORGANIZED HEALTH CARE EDUCATION/TRAINING PROGRAM

## 2024-07-10 PROCEDURE — 73030 X-RAY EXAM OF SHOULDER: CPT | Mod: LT

## 2024-07-10 PROCEDURE — 73030 X-RAY EXAM OF SHOULDER: CPT | Mod: LEFT SIDE | Performed by: RADIOLOGY

## 2024-07-10 NOTE — PROGRESS NOTES
CHIEF COMPLAINT: No chief complaint on file.    History: 89 y.o. female returns to clinic to follow-up for left proximal humerus fracture.  She is about 1 month out from injury.  Still at the acute rehab center.  Overall is feeling well.    6/28/24: presents to the office today for a follow up on a left humerus fracture that happened on 06-09-24.  She lives at home with her daughter, Susie who is here with her today.  She is currently staying at the acute care rehab at [facility].  She is wearing her sling.  She states that she is not having significant pain in the shoulder.  Prior to this, she was able to do her daily activities with the left shoulder.  Denies numbness or tingling.    Past medical history, past surgical history, medications, allergies, family history, social history, and review of systems were reviewed today.    A 12 point review of systems was negative other than as stated in the HPI.    Past Medical History:   Diagnosis Date    Personal history of other diseases of the circulatory system     History of hypertension    Personal history of other diseases of the nervous system and sense organs 12/16/2013    History of complex partial epilepsy    Pure hypercholesterolemia, unspecified     High cholesterol    Unspecified cataract     Cataracts, both eyes    Unspecified convulsions (Multi)     Seizures        Allergies   Allergen Reactions    Bee Venom Protein (Honey Bee) Other        Past Surgical History:   Procedure Laterality Date    CATARACT EXTRACTION  12/01/2014    Cataract Surgery    CT ANGIO NECK  9/16/2018    CT NECK ANGIO W AND WO IV CONTRAST 9/16/2018 [facility] EMERGENCY LEGACY    CT HEAD ANGIO W AND WO IV CONTRAST  9/16/2018    CT HEAD ANGIO W AND WO IV CONTRAST 9/16/2018 [facility] EMERGENCY LEGACY    MR HEAD ANGIO WO IV CONTRAST  11/28/2018    MR HEAD ANGIO WO IV CONTRAST 11/28/2018 [facility] INPATIENT LEGACY    MR NECK ANGIO WO IV CONTRAST  11/28/2018    MR NECK ANGIO WO IV CONTRAST 11/28/2018 [facility] INPATIENT  LEGACY    OTHER SURGICAL HISTORY  12/01/2014    Simple Mastectomy Right Breast        Family History   Problem Relation Name Age of Onset    Heart failure Mother      Tracheoesophageal fistual Father      Pancreatic cancer Sister      Coronary artery disease Brother      Pancreatic cancer Brother          Social History     Socioeconomic History    Marital status:      Spouse name: Not on file    Number of children: Not on file    Years of education: Not on file    Highest education level: Not on file   Occupational History    Not on file   Tobacco Use    Smoking status: Never    Smokeless tobacco: Never   Vaping Use    Vaping status: Never Used   Substance and Sexual Activity    Alcohol use: Never    Drug use: Never    Sexual activity: Not on file   Other Topics Concern    Not on file   Social History Narrative    Not on file     Social Determinants of Health     Financial Resource Strain: Low Risk  (6/10/2024)    Overall Financial Resource Strain (CARDIA)     Difficulty of Paying Living Expenses: Not very hard   Food Insecurity: Not on file   Transportation Needs: No Transportation Needs (6/10/2024)    PRAPARE - Transportation     Lack of Transportation (Medical): No     Lack of Transportation (Non-Medical): No   Physical Activity: Not on file   Stress: Not on file   Social Connections: Not on file   Intimate Partner Violence: Not on file   Housing Stability: Low Risk  (6/10/2024)    Housing Stability Vital Sign     Unable to Pay for Housing in the Last Year: No     Number of Places Lived in the Last Year: 1     Unstable Housing in the Last Year: No        CURRENT MEDICATIONS:   Current Outpatient Medications   Medication Sig Dispense Refill    amLODIPine (Norvasc) 5 mg tablet Take 1 tablet (5 mg) by mouth once daily.      apixaban (Eliquis) 2.5 mg tablet Take 1 tablet (2.5 mg) by mouth 2 times a day. 180 tablet 3    atorvastatin (Lipitor) 40 mg tablet Take 1 tablet (40 mg) by mouth once daily. 90 tablet 3     biotin 5 mg capsule Take by mouth.      carvedilol (Coreg) 6.25 mg tablet take one tablet every morning and evening. 180 tablet 3    cholecalciferol (Vitamin D-3) 25 MCG (1000 UT) tablet Take 1 tablet (25 mcg) by mouth once daily.      furosemide (Lasix) 20 mg tablet Take 0.5-1 tablets (10-20 mg) by mouth once daily. 45 tablet 2    levETIRAcetam (Keppra) 750 mg tablet Take 1 tablet (750 mg) by mouth 2 times a day. 180 tablet 3    losartan (Cozaar) 50 mg tablet Take 1 tablet (50 mg) by mouth once daily. 90 tablet 3    pantoprazole (ProtoNix) 20 mg EC tablet Take 1 tablet (20 mg) by mouth once daily. 90 tablet 3    potassium chloride CR 10 mEq ER tablet Take 1 tablet (10 mEq) by mouth every other day. 45 tablet 3    vitamins A,C,E-zinc-copper (PreserVision AREDS) 4,296 mcg-226 mg-90 mg capsule Take 1 capsule by mouth twice a day.       No current facility-administered medications for this visit.       Physical Examination:      6/12/2024    11:54 AM 6/12/2024     4:25 PM 6/12/2024     7:27 PM 6/12/2024    11:38 PM 6/13/2024     7:29 AM 6/13/2024     1:14 PM 6/28/2024    10:01 AM   Vitals   Systolic 154 150 167 142 148 160    Diastolic 72 73 74 77 65 69    Heart Rate 61 60 63 63  63    Temp 36.2 °C (97.2 °F) 36.5 °C (97.7 °F) 36.9 °C (98.4 °F) 36.9 °C (98.5 °F) 36.7 °C (98.1 °F) 36.3 °C (97.3 °F)    Resp   20 18  18    Height (in)       1.524 m (5')   Weight (lb)       104   BMI       20.31 kg/m2   BSA (m2)       1.41 m2   Visit Report       Report      There is no height or weight on file to calculate BMI.    Well-appearing, appears stated age, pleasant and cooperative, appropriate mood and behavior. Height and weight reviewed. Alert and oriented x3.  Auditory function intact.  No acute distress.  Intact ocular function, АЛЕКСАНДР, EOMI. Breathing is unlabored .  There is no evidence of jugular venous distension. Skin appearance is normal without evidence of rash or other lesions. 2+ radial pulses bilaterally, fingers  pink and wwp, good capillary refill, no pitting edema. No appreciable lymphadenopathy in bilateral upper extremities. SILT throughout both upper extremities, median/radial/ulnar/musculocutaneous/axillary nerve motor and sensory intact (except for abnormalities noted in focused musculoskeletal exam section below).     Neck exam:   Full range of motion of the neck in flexion/extension and rotational movements. No significant areas of tenderness to palpation in the neck.    On exam of bilateral upper extremities, no tenderness palpation about the left shoulder.  She actually has active forward flexion to 90, external rotation at 30 without pain today.    Imaging: Radiographs of the left shoulder were performed today.  Personally interpreted by myself.  Healing left proximal humerus fracture.    Assessment: Left proximal humerus fracture    Plan: Healing left proximal humerus fracture.  This point we can wean out of the sling.  Patient can use the left shoulder as tolerated.  Okay to bear weight on the arm.  Actually think it is okay for her to follow-up as needed going forward.  All questions were answered.        Dragon software was used to dictate this note, please be aware that minor errors in transcription may be present.    Sahil Evans MD    Shoulder/Elbow Surgery  [facility]

## 2024-07-10 NOTE — LETTER
July 10, 2024     Patient: Marian Fritz   YOB: 1926   Date of Visit: 7/10/2024       To Whom It May Concern:    It is my medical opinion that Marian Fritz  has a healed left shoulder fracture . She does not need a sling. She can use the arm as tolerated, ok to bear weight. Would recommend staying at rehab until 7/20 to build strength.     If you have any questions or concerns, please don't hesitate to call.         Sincerely,        Sahil Evans MD    CC: No Recipients

## 2024-07-11 ENCOUNTER — NURSING HOME VISIT (OUTPATIENT)
Dept: POST ACUTE CARE | Facility: EXTERNAL LOCATION | Age: 89
End: 2024-07-11
Payer: MEDICARE
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
## 2024-07-11 DIAGNOSIS — S42.412D CLOSED SUPRACONDYLAR FRACTURE OF LEFT HUMERUS WITH ROUTINE HEALING, SUBSEQUENT ENCOUNTER: ICD-10-CM

## 2024-07-11 DIAGNOSIS — I50.9 CHRONIC CONGESTIVE HEART FAILURE, UNSPECIFIED HEART FAILURE TYPE (MULTI): ICD-10-CM

## 2024-07-11 DIAGNOSIS — H91.93 BILATERAL HEARING LOSS, UNSPECIFIED HEARING LOSS TYPE: ICD-10-CM
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
## 2024-07-11 DIAGNOSIS — Z86.73 HISTORY OF CVA IN ADULTHOOD: ICD-10-CM

## 2024-07-11 PROCEDURE — 99308 SBSQ NF CARE LOW MDM 20: CPT | Performed by: INTERNAL MEDICINE

## 2024-07-11 NOTE — PROGRESS NOTES
Nursing home visit  Name: Marian Fritz  MRN: [id number]  YOB: 1926  Date of Service: 7/11/2024      Pt was evaluated for periodic clinical evaluation today   Patient is doing OK.  No sob, cp, PND, orthopnea  Eating ok, sleeping ok   Moving BM ok.   No significant pain issue  Tolerating medications well    Objective :  Vitals were noted  [place]  Patient is not any acute distress  Conjunctiva- Pink, no icterus   No cervical LN enlargement   Lungs clear, s1s2 +   Doesnot have sling on on her L upper limb anymore  No edema , No calf tenderness     Assessment:    1. Fall in home, subsequent encounter    2. Closed supracondylar fracture of left humerus with routine healing, subsequent encounter    3. Bilateral hearing loss, unspecified hearing loss type    4. History of CVA in adulthood    5. Chronic congestive heart failure, unspecified heart failure type (Multi)         Plan:  Patient is doing well.   Current medications are effective. advised to continue current medications.  Will continue to follow   Continue therapy   Patient felt satisfied with plan

## 2024-07-11 NOTE — LETTER
Patient: Marian Fritz  : 1926    Encounter Date: 2024    Nursing home visit  Name: Marian Fritz  MRN: [id number]  YOB: 1926  Date of Service: 2024      Pt was evaluated for periodic clinical evaluation today   Patient is doing OK.  No sob, cp, PND, orthopnea  Eating ok, sleeping ok   Moving BM ok.   No significant pain issue  Tolerating medications well    Objective :  Vitals were noted  [place]  Patient is not any acute distress  Conjunctiva- Pink, no icterus   No cervical LN enlargement   Lungs clear, s1s2 +   Doesnot have sling on on her L upper limb anymore  No edema , No calf tenderness     Assessment:    1. Fall in home, subsequent encounter    2. Closed supracondylar fracture of left humerus with routine healing, subsequent encounter    3. Bilateral hearing loss, unspecified hearing loss type    4. History of CVA in adulthood    5. Chronic congestive heart failure, unspecified heart failure type (Multi)         Plan:  Patient is doing well.   Current medications are effective. advised to continue current medications.  Will continue to follow   Continue therapy   Patient felt satisfied with plan      Electronically Signed By: Pantera Cotto MD   24  4:26 PM

## 2024-07-18 ENCOUNTER — NURSING HOME VISIT (OUTPATIENT)
Dept: POST ACUTE CARE | Facility: EXTERNAL LOCATION | Age: 89
End: 2024-07-18
Payer: MEDICARE

## 2024-07-18 DIAGNOSIS — Z95.0 PACEMAKER: ICD-10-CM

## 2024-07-18 DIAGNOSIS — I50.9 CHRONIC CONGESTIVE HEART FAILURE, UNSPECIFIED HEART FAILURE TYPE (MULTI): ICD-10-CM

## 2024-07-18 DIAGNOSIS — Z75.8 DISCHARGE PLANNING ISSUES: ICD-10-CM
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
## 2024-07-18 DIAGNOSIS — H91.93 BILATERAL HEARING LOSS, UNSPECIFIED HEARING LOSS TYPE: ICD-10-CM

## 2024-07-18 DIAGNOSIS — I10 BENIGN ESSENTIAL HTN: ICD-10-CM

## 2024-07-18 DIAGNOSIS — S42.412D CLOSED SUPRACONDYLAR FRACTURE OF LEFT HUMERUS WITH ROUTINE HEALING, SUBSEQUENT ENCOUNTER: ICD-10-CM

## 2024-07-18 PROCEDURE — 99315 NF DSCHRG MGMT 30 MIN/LESS: CPT | Performed by: INTERNAL MEDICINE

## 2024-07-18 NOTE — PROGRESS NOTES
Nursing home visit  Name: Marian Fritz  MRN: [id number]  YOB: 1926  Date of Service: 7/18/2024      Pt was evaluated for periodic clinical evaluation today   Patient is doing OK.  No sob, cp, PND, orthopnea  Eating ok, sleeping ok   Moving BM ok.   Arm pain is ok. No more sling in arm .  No significant pain issue  Tolerating medications well    Objective :  Vitals were noted  Patient is not any acute distress  Conjunctiva- Pink, no icterus   No cervical LN enlargement   Lungs clear, s1s2 +   No edema , No calf tenderness     Assessment:    1. Fall in home, subsequent encounter    2. Closed supracondylar fracture of left humerus with routine healing, subsequen  t encounter -not in pain   3. Bilateral hearing loss, unspecified hearing loss type    4. Benign essential HTN    5. Pacemaker -hx of   6. Chronic congestive heart failure, unspecified heart failure type (Multi) -compensated   7. Discharge planning issues         Plan:  Patient is doing well.   She is planning to go home with her daughter in 2 days. All questions related to it answered. Professional and medical discussion time was > 25 min  Current medications are effective. advised to continue current medications.  She Will continue to follow with her PCP  Patient felt satisfied with plan

## 2024-07-18 NOTE — LETTER
Patient: Marian Fritz  : 1926    Encounter Date: 2024    Nursing home visit  Name: Marian Fritz  MRN: [id number]  YOB: 1926  Date of Service: 2024      Pt was evaluated for periodic clinical evaluation today   Patient is doing OK.  No sob, cp, PND, orthopnea  Eating ok, sleeping ok   Moving BM ok.   Arm pain is ok. No more sling in arm .  No significant pain issue  Tolerating medications well    Objective :  Vitals were noted  Patient is not any acute distress  Conjunctiva- Pink, no icterus   No cervical LN enlargement   Lungs clear, s1s2 +   No edema , No calf tenderness     Assessment:    1. Fall in home, subsequent encounter    2. Closed supracondylar fracture of left humerus with routine healing, subsequen  t encounter -not in pain   3. Bilateral hearing loss, unspecified hearing loss type    4. Benign essential HTN    5. Pacemaker -hx of   6. Chronic congestive heart failure, unspecified heart failure type (Multi) -compensated   7. Discharge planning issues         Plan:  Patient is doing well.   She is planning to go home with her daughter in 2 days. All questions related to it answered. Professional and medical discussion time was > 25 min  Current medications are effective. advised to continue current medications.  She Will continue to follow with her PCP  Patient felt satisfied with plan      Electronically Signed By: Pantera Cotto MD   24 10:38 AM

## 2024-07-21 ENCOUNTER — APPOINTMENT (OUTPATIENT)
Dept: RADIOLOGY | Facility: HOSPITAL | Age: 89
End: 2024-07-21
Payer: MEDICARE

## 2024-07-21 ENCOUNTER — HOSPITAL ENCOUNTER (OUTPATIENT)
Facility: HOSPITAL | Age: 89
Setting detail: OBSERVATION
Discharge: NURSING, REHAB OR LONG-TERM CARE | End: 2024-07-23
Attending: EMERGENCY MEDICINE | Admitting: INTERNAL MEDICINE
Payer: MEDICARE

## 2024-07-21 ENCOUNTER — APPOINTMENT (OUTPATIENT)
Dept: CARDIOLOGY | Facility: HOSPITAL | Age: 89
End: 2024-07-21
Payer: MEDICARE

## 2024-07-21 DIAGNOSIS — U07.1 COVID-19: Primary | ICD-10-CM

## 2024-07-21 LAB
ALBUMIN SERPL BCP-MCNC: 3.8 G/DL (ref 3.4–5)
ALP SERPL-CCNC: 91 U/L (ref 33–136)
ALT SERPL W P-5'-P-CCNC: 19 U/L (ref 7–45)
ANION GAP SERPL CALC-SCNC: 12 MMOL/L (ref 10–20)
APPEARANCE UR: ABNORMAL
AST SERPL W P-5'-P-CCNC: 39 U/L (ref 9–39)
BASOPHILS # BLD AUTO: 0.01 X10*3/UL (ref 0–0.1)
BASOPHILS NFR BLD AUTO: 0.1 %
BILIRUB SERPL-MCNC: 0.6 MG/DL (ref 0–1.2)
BILIRUB UR STRIP.AUTO-MCNC: NEGATIVE MG/DL
BUN SERPL-MCNC: 17 MG/DL (ref 6–23)
CALCIUM SERPL-MCNC: 9 MG/DL (ref 8.6–10.3)
CHLORIDE SERPL-SCNC: 98 MMOL/L (ref 98–107)
CO2 SERPL-SCNC: 28 MMOL/L (ref 21–32)
COLOR UR: ABNORMAL
CREAT SERPL-MCNC: 0.76 MG/DL (ref 0.5–1.05)
EGFRCR SERPLBLD CKD-EPI 2021: 71 ML/MIN/1.73M*2
EOSINOPHIL # BLD AUTO: 0 X10*3/UL (ref 0–0.4)
EOSINOPHIL NFR BLD AUTO: 0 %
ERYTHROCYTE [DISTWIDTH] IN BLOOD BY AUTOMATED COUNT: 12.3 % (ref 11.5–14.5)
ERYTHROCYTE [DISTWIDTH] IN BLOOD BY AUTOMATED COUNT: 12.4 % (ref 11.5–14.5)
GLUCOSE SERPL-MCNC: 135 MG/DL (ref 74–99)
GLUCOSE UR STRIP.AUTO-MCNC: NORMAL MG/DL
HCT VFR BLD AUTO: 39 % (ref 36–46)
HCT VFR BLD AUTO: 46.1 % (ref 36–46)
HGB BLD-MCNC: 13.4 G/DL (ref 12–16)
HGB BLD-MCNC: 15.4 G/DL (ref 12–16)
IMM GRANULOCYTES # BLD AUTO: 0.03 X10*3/UL (ref 0–0.5)
IMM GRANULOCYTES NFR BLD AUTO: 0.4 % (ref 0–0.9)
KETONES UR STRIP.AUTO-MCNC: NEGATIVE MG/DL
LEUKOCYTE ESTERASE UR QL STRIP.AUTO: NEGATIVE
LYMPHOCYTES # BLD AUTO: 1.26 X10*3/UL (ref 0.8–3)
LYMPHOCYTES NFR BLD AUTO: 15.2 %
MCH RBC QN AUTO: 32.2 PG (ref 26–34)
MCH RBC QN AUTO: 32.8 PG (ref 26–34)
MCHC RBC AUTO-ENTMCNC: 33.4 G/DL (ref 32–36)
MCHC RBC AUTO-ENTMCNC: 34.4 G/DL (ref 32–36)
MCV RBC AUTO: 96 FL (ref 80–100)
MCV RBC AUTO: 96 FL (ref 80–100)
MONOCYTES # BLD AUTO: 0.94 X10*3/UL (ref 0.05–0.8)
MONOCYTES NFR BLD AUTO: 11.4 %
NEUTROPHILS # BLD AUTO: 6.03 X10*3/UL (ref 1.6–5.5)
NEUTROPHILS NFR BLD AUTO: 72.9 %
NITRITE UR QL STRIP.AUTO: NEGATIVE
NRBC BLD-RTO: 0 /100 WBCS (ref 0–0)
NRBC BLD-RTO: 0 /100 WBCS (ref 0–0)
PH UR STRIP.AUTO: 6.5 [PH]
PLATELET # BLD AUTO: 191 X10*3/UL (ref 150–450)
PLATELET # BLD AUTO: 223 X10*3/UL (ref 150–450)
POTASSIUM SERPL-SCNC: 4.9 MMOL/L (ref 3.5–5.3)
PROT SERPL-MCNC: 7.4 G/DL (ref 6.4–8.2)
PROT UR STRIP.AUTO-MCNC: NEGATIVE MG/DL
RBC # BLD AUTO: 4.08 X10*6/UL (ref 4–5.2)
RBC # BLD AUTO: 4.78 X10*6/UL (ref 4–5.2)
RBC # UR STRIP.AUTO: NEGATIVE /UL
SARS-COV-2 RNA RESP QL NAA+PROBE: DETECTED
SODIUM SERPL-SCNC: 133 MMOL/L (ref 136–145)
SP GR UR STRIP.AUTO: 1.01
UROBILINOGEN UR STRIP.AUTO-MCNC: NORMAL MG/DL
WBC # BLD AUTO: 6.8 X10*3/UL (ref 4.4–11.3)
WBC # BLD AUTO: 8.3 X10*3/UL (ref 4.4–11.3)

## 2024-07-21 PROCEDURE — 81003 URINALYSIS AUTO W/O SCOPE: CPT

## 2024-07-21 PROCEDURE — 71046 X-RAY EXAM CHEST 2 VIEWS: CPT | Performed by: RADIOLOGY

## 2024-07-21 PROCEDURE — 85025 COMPLETE CBC W/AUTO DIFF WBC: CPT

## 2024-07-21 PROCEDURE — 85027 COMPLETE CBC AUTOMATED: CPT | Mod: 59 | Performed by: NURSE PRACTITIONER

## 2024-07-21 PROCEDURE — 36415 COLL VENOUS BLD VENIPUNCTURE: CPT

## 2024-07-21 PROCEDURE — 93005 ELECTROCARDIOGRAM TRACING: CPT

## 2024-07-21 PROCEDURE — 99285 EMERGENCY DEPT VISIT HI MDM: CPT | Mod: 25

## 2024-07-21 PROCEDURE — 71046 X-RAY EXAM CHEST 2 VIEWS: CPT

## 2024-07-21 PROCEDURE — G0378 HOSPITAL OBSERVATION PER HR: HCPCS

## 2024-07-21 PROCEDURE — 87635 SARS-COV-2 COVID-19 AMP PRB: CPT

## 2024-07-21 PROCEDURE — 36415 COLL VENOUS BLD VENIPUNCTURE: CPT | Performed by: NURSE PRACTITIONER

## 2024-07-21 PROCEDURE — 2500000002 HC RX 250 W HCPCS SELF ADMINISTERED DRUGS (ALT 637 FOR MEDICARE OP, ALT 636 FOR OP/ED): Performed by: NURSE PRACTITIONER

## 2024-07-21 PROCEDURE — 80053 COMPREHEN METABOLIC PANEL: CPT

## 2024-07-21 PROCEDURE — 2500000001 HC RX 250 WO HCPCS SELF ADMINISTERED DRUGS (ALT 637 FOR MEDICARE OP): Performed by: NURSE PRACTITIONER

## 2024-07-21 RX ORDER — AMLODIPINE BESYLATE 5 MG/1
5 TABLET ORAL DAILY
Status: DISCONTINUED | OUTPATIENT
Start: 2024-07-21 | End: 2024-07-23 | Stop reason: HOSPADM

## 2024-07-21 RX ORDER — PANTOPRAZOLE SODIUM 20 MG/1
20 TABLET, DELAYED RELEASE ORAL DAILY
Status: DISCONTINUED | OUTPATIENT
Start: 2024-07-21 | End: 2024-07-23 | Stop reason: HOSPADM

## 2024-07-21 RX ORDER — CARVEDILOL 6.25 MG/1
6.25 TABLET ORAL 2 TIMES DAILY
Status: DISCONTINUED | OUTPATIENT
Start: 2024-07-21 | End: 2024-07-23 | Stop reason: HOSPADM

## 2024-07-21 RX ORDER — CHOLECALCIFEROL (VITAMIN D3) 25 MCG
1000 TABLET ORAL DAILY
Status: DISCONTINUED | OUTPATIENT
Start: 2024-07-21 | End: 2024-07-23 | Stop reason: HOSPADM

## 2024-07-21 RX ORDER — ATORVASTATIN CALCIUM 40 MG/1
40 TABLET, FILM COATED ORAL DAILY
Status: DISCONTINUED | OUTPATIENT
Start: 2024-07-21 | End: 2024-07-23 | Stop reason: HOSPADM

## 2024-07-21 RX ORDER — LOSARTAN POTASSIUM 50 MG/1
50 TABLET ORAL DAILY
Status: DISCONTINUED | OUTPATIENT
Start: 2024-07-21 | End: 2024-07-23 | Stop reason: HOSPADM

## 2024-07-21 SDOH — SOCIAL STABILITY: SOCIAL INSECURITY: ARE YOU OR HAVE YOU BEEN THREATENED OR ABUSED PHYSICALLY, EMOTIONALLY, OR SEXUALLY BY ANYONE?: NO

## 2024-07-21 SDOH — SOCIAL STABILITY: SOCIAL INSECURITY: ABUSE: ADULT

## 2024-07-21 SDOH — SOCIAL STABILITY: SOCIAL INSECURITY: ARE THERE ANY APPARENT SIGNS OF INJURIES/BEHAVIORS THAT COULD BE RELATED TO ABUSE/NEGLECT?: NO

## 2024-07-21 SDOH — SOCIAL STABILITY: SOCIAL INSECURITY: WERE YOU ABLE TO COMPLETE ALL THE BEHAVIORAL HEALTH SCREENINGS?: YES

## 2024-07-21 SDOH — SOCIAL STABILITY: SOCIAL INSECURITY: HAVE YOU HAD THOUGHTS OF HARMING ANYONE ELSE?: NO

## 2024-07-21 SDOH — SOCIAL STABILITY: SOCIAL INSECURITY: DO YOU FEEL ANYONE HAS EXPLOITED OR TAKEN ADVANTAGE OF YOU FINANCIALLY OR OF YOUR PERSONAL PROPERTY?: NO

## 2024-07-21 SDOH — SOCIAL STABILITY: SOCIAL INSECURITY: HAS ANYONE EVER THREATENED TO HURT YOUR FAMILY OR YOUR PETS?: NO

## 2024-07-21 SDOH — SOCIAL STABILITY: SOCIAL INSECURITY: DOES ANYONE TRY TO KEEP YOU FROM HAVING/CONTACTING OTHER FRIENDS OR DOING THINGS OUTSIDE YOUR HOME?: NO

## 2024-07-21 SDOH — SOCIAL STABILITY: SOCIAL INSECURITY: DO YOU FEEL UNSAFE GOING BACK TO THE PLACE WHERE YOU ARE LIVING?: NO

## 2024-07-21 SDOH — SOCIAL STABILITY: SOCIAL INSECURITY: HAVE YOU HAD ANY THOUGHTS OF HARMING ANYONE ELSE?: NO

## 2024-07-21 ASSESSMENT — ACTIVITIES OF DAILY LIVING (ADL)
ASSISTIVE_DEVICE: EYEGLASSES
BATHING: NEEDS ASSISTANCE
ADEQUATE_TO_COMPLETE_ADL: YES
WALKS IN HOME: NEEDS ASSISTANCE
FEEDING YOURSELF: NEEDS ASSISTANCE
ASSISTIVE_DEVICE: EYEGLASSES;WALKER
TOILETING: NEEDS ASSISTANCE
JUDGMENT_ADEQUATE_SAFELY_COMPLETE_DAILY_ACTIVITIES: YES
ADEQUATE_TO_COMPLETE_ADL: YES
ADEQUATE_TO_COMPLETE_ADL: YES
PATIENT'S MEMORY ADEQUATE TO SAFELY COMPLETE DAILY ACTIVITIES?: YES
PATIENT'S MEMORY ADEQUATE TO SAFELY COMPLETE DAILY ACTIVITIES?: YES
TOILETING: NEEDS ASSISTANCE
JUDGMENT_ADEQUATE_SAFELY_COMPLETE_DAILY_ACTIVITIES: YES
GROOMING: NEEDS ASSISTANCE
BATHING: NEEDS ASSISTANCE
TOILETING: NEEDS ASSISTANCE
GROOMING: NEEDS ASSISTANCE
PATIENT'S MEMORY ADEQUATE TO SAFELY COMPLETE DAILY ACTIVITIES?: YES
WALKS IN HOME: NEEDS ASSISTANCE
BATHING: NEEDS ASSISTANCE
BATHING: NEEDS ASSISTANCE
DRESSING YOURSELF: NEEDS ASSISTANCE
HEARING - RIGHT EAR: FUNCTIONAL
GROOMING: NEEDS ASSISTANCE
WALKS IN HOME: NEEDS ASSISTANCE
ADEQUATE_TO_COMPLETE_ADL: YES
DRESSING YOURSELF: NEEDS ASSISTANCE
ASSISTIVE_DEVICE: EYEGLASSES;WALKER
JUDGMENT_ADEQUATE_SAFELY_COMPLETE_DAILY_ACTIVITIES: YES
FEEDING YOURSELF: INDEPENDENT
LACK_OF_TRANSPORTATION: NO
ASSISTIVE_DEVICE: EYEGLASSES;WALKER
DRESSING YOURSELF: NEEDS ASSISTANCE
DRESSING YOURSELF: NEEDS ASSISTANCE
FEEDING YOURSELF: NEEDS ASSISTANCE
PATIENT'S MEMORY ADEQUATE TO SAFELY COMPLETE DAILY ACTIVITIES?: YES
WALKS IN HOME: NEEDS ASSISTANCE
FEEDING YOURSELF: NEEDS ASSISTANCE
HEARING - LEFT EAR: FUNCTIONAL
GROOMING: NEEDS ASSISTANCE
JUDGMENT_ADEQUATE_SAFELY_COMPLETE_DAILY_ACTIVITIES: YES
TOILETING: NEEDS ASSISTANCE
LACK_OF_TRANSPORTATION: NO

## 2024-07-21 ASSESSMENT — COGNITIVE AND FUNCTIONAL STATUS - GENERAL
TOILETING: A LOT
TURNING FROM BACK TO SIDE WHILE IN FLAT BAD: A LOT
MOVING FROM LYING ON BACK TO SITTING ON SIDE OF FLAT BED WITH BEDRAILS: A LOT
STANDING UP FROM CHAIR USING ARMS: A LOT
HELP NEEDED FOR BATHING: A LOT
WALKING IN HOSPITAL ROOM: TOTAL
DAILY ACTIVITIY SCORE: 13
DRESSING REGULAR LOWER BODY CLOTHING: A LOT
MOVING TO AND FROM BED TO CHAIR: A LOT
DRESSING REGULAR UPPER BODY CLOTHING: A LOT
MOBILITY SCORE: 10
PATIENT BASELINE BEDBOUND: NO
CLIMB 3 TO 5 STEPS WITH RAILING: TOTAL
PERSONAL GROOMING: A LOT
EATING MEALS: A LITTLE

## 2024-07-21 ASSESSMENT — LIFESTYLE VARIABLES
HOW MANY STANDARD DRINKS CONTAINING ALCOHOL DO YOU HAVE ON A TYPICAL DAY: PATIENT DOES NOT DRINK
HOW OFTEN DO YOU HAVE A DRINK CONTAINING ALCOHOL: NEVER
SUBSTANCE_ABUSE_PAST_12_MONTHS: NO
SKIP TO QUESTIONS 9-10: 1
AUDIT-C TOTAL SCORE: 0
AUDIT-C TOTAL SCORE: 0
HOW OFTEN DO YOU HAVE 6 OR MORE DRINKS ON ONE OCCASION: NEVER
PRESCIPTION_ABUSE_PAST_12_MONTHS: NO

## 2024-07-21 ASSESSMENT — ENCOUNTER SYMPTOMS
WEAKNESS: 1
ACTIVITY CHANGE: 1
RESPIRATORY NEGATIVE: 1
ALLERGIC/IMMUNOLOGIC NEGATIVE: 1
ENDOCRINE NEGATIVE: 1
MYALGIAS: 1
GASTROINTESTINAL NEGATIVE: 1
PSYCHIATRIC NEGATIVE: 1
SEIZURES: 1
CARDIOVASCULAR NEGATIVE: 1
EYES NEGATIVE: 1

## 2024-07-21 ASSESSMENT — COLUMBIA-SUICIDE SEVERITY RATING SCALE - C-SSRS
1. IN THE PAST MONTH, HAVE YOU WISHED YOU WERE DEAD OR WISHED YOU COULD GO TO SLEEP AND NOT WAKE UP?: NO
6. HAVE YOU EVER DONE ANYTHING, STARTED TO DO ANYTHING, OR PREPARED TO DO ANYTHING TO END YOUR LIFE?: NO
2. HAVE YOU ACTUALLY HAD ANY THOUGHTS OF KILLING YOURSELF?: NO

## 2024-07-21 ASSESSMENT — PATIENT HEALTH QUESTIONNAIRE - PHQ9
2. FEELING DOWN, DEPRESSED OR HOPELESS: NOT AT ALL
1. LITTLE INTEREST OR PLEASURE IN DOING THINGS: NOT AT ALL
SUM OF ALL RESPONSES TO PHQ9 QUESTIONS 1 & 2: 0

## 2024-07-21 NOTE — ED PROVIDER NOTES
HPI   No chief complaint on file.      HPI  Patient is a 89-year-old female with a past medical history of seizures, chronic diastolic congestive heart failure, CVA, GERD, HTN, HLD, chronic hearing loss, pacemaker, and DVT who presents to the emergency department today via EMS for concern of COVID.  They stated that the patient took a home COVID test which was positive and they brought her to the emergency department for further evaluation.  Patient states that she has not had any shortness of breath or chest pain and that she is not sure why she was brought into the emergency department.  She denies recent fevers, headache, abdominal pain, nausea or vomiting, changes in urination or bowel habits.  Patient's notes show that she was discharged on  after a humeral fracture from a fall at home.  Patient's daughter states that she was able to move around with a walker approximately a week ago, but then when she was released yesterday she seems much more frail and tired.  She states that she was concerned for COVID and all she had at home was an  COVID test, but resulted as positive.    Patient History   Past Medical History:   Diagnosis Date    Personal history of other diseases of the circulatory system     History of hypertension    Personal history of other diseases of the nervous system and sense organs 2013    History of complex partial epilepsy    Pure hypercholesterolemia, unspecified     High cholesterol    Unspecified cataract     Cataracts, both eyes    Unspecified convulsions (Multi)     Seizures     Past Surgical History:   Procedure Laterality Date    CATARACT EXTRACTION  2014    Cataract Surgery    CT ANGIO NECK  2018    CT NECK ANGIO W AND WO IV CONTRAST 2018 [facility] EMERGENCY LEGACY    CT HEAD ANGIO W AND WO IV CONTRAST  2018    CT HEAD ANGIO W AND WO IV CONTRAST 2018 [facility] EMERGENCY LEGACY    MR HEAD ANGIO WO IV CONTRAST  2018    MR HEAD ANGIO WO IV CONTRAST  11/28/2018 [facility] INPATIENT LEGACY    MR NECK ANGIO WO IV CONTRAST  11/28/2018    MR NECK ANGIO WO IV CONTRAST 11/28/2018 [facility] INPATIENT LEGACY    OTHER SURGICAL HISTORY  12/01/2014    Simple Mastectomy Right Breast     Family History   Problem Relation Name Age of Onset    Heart failure Mother      Tracheoesophageal fistual Father      Pancreatic cancer Sister      Coronary artery disease Brother      Pancreatic cancer Brother       Social History     Tobacco Use    Smoking status: Never    Smokeless tobacco: Never   Vaping Use    Vaping status: Never Used   Substance Use Topics    Alcohol use: Never    Drug use: Never       Physical Exam   ED Triage Vitals [07/21/24 0957]   Temperature Heart Rate Respirations BP   36.4 °C (97.6 °F) 79 18 146/64      Pulse Ox Temp Source Heart Rate Source Patient Position   95 % Oral Monitor Lying      BP Location FiO2 (%)     Right arm --       Physical Exam  Vitals and nursing note reviewed.   Constitutional:       General: She is not in acute distress.     Appearance: She is well-developed.   HENT:      Head: Normocephalic and atraumatic.   Eyes:      Conjunctiva/sclera: Conjunctivae normal.   Cardiovascular:      Rate and Rhythm: Normal rate and regular rhythm.      Heart sounds: No murmur heard.  Pulmonary:      Effort: Pulmonary effort is normal. No respiratory distress.      Breath sounds: Normal breath sounds.   Abdominal:      Palpations: Abdomen is soft.      Tenderness: There is no abdominal tenderness.   Musculoskeletal:         General: No swelling.      Cervical back: Neck supple.   Skin:     General: Skin is warm and dry.      Capillary Refill: Capillary refill takes less than 2 seconds.   Neurological:      Mental Status: She is alert and oriented to person, place, and time.   Psychiatric:         Mood and Affect: Mood normal.       ED Course & MDM   Diagnoses as of 07/21/24 1212   COVID-19                       No data recorded                Medical Decision  Making  Patient is a 89-year-old female with a past medical history of seizures, chronic diastolic congestive heart failure, CVA, GERD, HTN, HLD, chronic hearing loss, pacemaker, and DVT who presents to the emergency department today via EMS for concern of COVID.  Patient saturating at 95% on room air without evidence of additional work of breathing.  Patient's vitals are stable and within normal limits including being afebrile.  Patient is EKG shows a ventricular paced rhythm at a rate of 66 bpm, normal intervals, leftward axis, slight ST elevation in 3 and aVF consistent with prior.  Patient CBC showed no evidence of leukocytosis with a WBC of 8.3 and no concern for anemia with a hemoglobin of 15.4.  Patient CMP was unremarkable.  Patient tested positive for COVID.  Patient's chest x-ray showed no changes when compared to one from 6/11/2024.  Patient's urinalysis was negative for any indication of UTI.  Patient admitted to general medicine under Dr. Holley for observation for further care of Covid 19 and due to inability to ambulate.    Procedure  Procedures none     Darrel Iyer DO  Resident  07/21/24 4816

## 2024-07-21 NOTE — ED TRIAGE NOTES
PT TO ED VIA EMS FROM HOME FOR INCREASED WEAKNESS AND A POSITIVE COVID TEST. PTS FAMILY STATES THAT SHE IS NORMALLY ABLE TO WALK BUT LATELY SHE HAS HAD TO USE A WHEELCHAIR TO GET AROUND. DR. THORPE AT BEDSIDE FOR TRIAGE AND ASSESSMENT. PT IS ORIENTED TO SELF AND TIME. PT DENIES CP, NUMBNESS, DIZZINESS.

## 2024-07-21 NOTE — PROGRESS NOTES
Pharmacy Medication History Review    Marian Fritz is a 89 y.o. female admitted for No Principal Problem: There is no principal problem currently on the Problem List. Please update the Problem List and refresh.. Pharmacy reviewed the patient's [id number] medications and allergies for accuracy.    The list below reflectives the updated PTA list. Please review each medication in order reconciliation for additional clarification and justification.  Prior to Admission Medications   Prescriptions Last Dose Informant Patient Reported? Taking?   amLODIPine (Norvasc) 5 mg tablet 7/21/2024 Child Yes Yes   Sig: Take 1 tablet (5 mg) by mouth once daily.   apixaban (Eliquis) 2.5 mg tablet 7/21/2024 Child No Yes   Sig: Take 1 tablet (2.5 mg) by mouth 2 times a day.   atorvastatin (Lipitor) 40 mg tablet 7/20/2024 Child No Yes   Sig: Take 1 tablet (40 mg) by mouth once daily.   biotin 5 mg capsule 7/20/2024 Child Yes Yes   Sig: Take by mouth.   carvedilol (Coreg) 6.25 mg tablet 7/21/2024 Child No Yes   Sig: take one tablet every morning and evening.   cholecalciferol (Vitamin D-3) 25 MCG (1000 UT) tablet  Child Yes No   Sig: Take 1 tablet (25 mcg) by mouth once daily.   furosemide (Lasix) 20 mg tablet 7/21/2024 Child No Yes   Sig: Take 0.5-1 tablets (10-20 mg) by mouth once daily.   Patient taking differently: Take 1 tablet (20 mg) by mouth every other day.   levETIRAcetam (Keppra) 750 mg tablet 7/21/2024 Child No Yes   Sig: Take 1 tablet (750 mg) by mouth 2 times a day.   losartan (Cozaar) 50 mg tablet 7/21/2024 Child No Yes   Sig: Take 1 tablet (50 mg) by mouth once daily.   Patient taking differently: Take 2 tablets (100 mg) by mouth once daily.   pantoprazole (ProtoNix) 20 mg EC tablet 7/21/2024 Child No Yes   Sig: Take 1 tablet (20 mg) by mouth once daily.   potassium chloride CR 10 mEq ER tablet 7/21/2024 Child No Yes   Sig: Take 1 tablet (10 mEq) by mouth every other day.   Patient taking differently: Take 1  tablet (10 mEq) by mouth every other day. Take w/ furosemide   vitamins A,C,E-zinc-copper (PreserVision AREDS) 4,296 mcg-226 mg-90 mg capsule 7/20/2024 Child Yes Yes   Sig: Take 1 capsule by mouth twice a day.      Facility-Administered Medications: None          The list below reflectives the updated allergy list. Please review each documented allergy for additional clarification and justification.  Allergies  Reviewed by Mamta Merchant RN on 7/21/2024        Severity Reactions Comments    Bee Venom Protein (honey Bee) Not Specified Other             Below are additional concerns with the patient's PTA list.  None     Source: Patient's daughter (caregiver)    Edilberto Muse

## 2024-07-21 NOTE — H&P
History Of Present Illness  Marian Fritz is a 89 y.o. female presenting with Covid .  Patient with a past medical history significant for seizures chronic diastolic congestive heart failure history of CVA acid reflux hypertension dyslipidemia and DVT had a recent mechanical fall at home and a humerus fracture who was recommended conservative treatment and was in rehab.  She was discharged yesterday and was doing ok the day before eating in the dining room ect. At the time when family picked her up for home could barely get her in the car and then at home she could not participate in any activities. She was palced to bed and this morning same experience she could barely do anything physical. Her son in law is an ex  employee and called and it was recommended that she be brought to the hospital for eval.  Testing the ER revealed Covid.      On interview in the ED daughter at bedside and whom she lives with explained the above story.  Currently patient can answer questions, liited cough, resting comfortably. BLACK x 4 with command, knows she is in the hospital and aware she is fighting a virus- covid, she has never had covid prior.  Admit to observation        Past Medical History  Past Medical History:   Diagnosis Date    Personal history of other diseases of the circulatory system     History of hypertension    Personal history of other diseases of the nervous system and sense organs 12/16/2013    History of complex partial epilepsy    Pure hypercholesterolemia, unspecified     High cholesterol    Unspecified cataract     Cataracts, both eyes    Unspecified convulsions (Multi)     Seizures       Surgical History  Past Surgical History:   Procedure Laterality Date    CATARACT EXTRACTION  12/01/2014    Cataract Surgery    CT ANGIO NECK  9/16/2018    CT NECK ANGIO W AND WO IV CONTRAST 9/16/2018 [facility] EMERGENCY LEGACY    CT HEAD ANGIO W AND WO IV CONTRAST  9/16/2018    CT HEAD ANGIO W AND WO IV CONTRAST 9/16/2018 [facility]  EMERGENCY LEGACY    MR HEAD ANGIO WO IV CONTRAST  11/28/2018    MR HEAD ANGIO WO IV CONTRAST 11/28/2018 [facility] INPATIENT LEGACY    MR NECK ANGIO WO IV CONTRAST  11/28/2018    MR NECK ANGIO WO IV CONTRAST 11/28/2018 [facility] INPATIENT LEGACY    OTHER SURGICAL HISTORY  12/01/2014    Simple Mastectomy Right Breast        Social History  She reports that she has never smoked. She has never used smokeless tobacco. She reports that she does not drink alcohol and does not use drugs.  Lives with daughter and son in law.   Just released from the rehab yesterday     Family History  Family History   Problem Relation Name Age of Onset    Heart failure Mother      Tracheoesophageal fistual Father      Pancreatic cancer Sister      Coronary artery disease Brother      Pancreatic cancer Brother          Allergies  Bee venom protein (honey bee)    Review of Systems   Constitutional:  Positive for activity change.   HENT: Negative.     Eyes: Negative.    Respiratory: Negative.     Cardiovascular: Negative.    Gastrointestinal: Negative.    Endocrine: Negative.    Genitourinary: Negative.    Musculoskeletal:  Positive for gait problem and myalgias.   Skin: Negative.    Allergic/Immunologic: Negative.    Neurological:  Positive for seizures and weakness.   Psychiatric/Behavioral: Negative.          Physical Exam  HENT:      Mouth/Throat:      Mouth: Mucous membranes are moist.   Cardiovascular:      Rate and Rhythm: Regular rhythm.   Pulmonary:      Effort: Pulmonary effort is normal.   Abdominal:      Palpations: Abdomen is soft.   Musculoskeletal:         General: Normal range of motion.   Skin:     General: Skin is warm and dry.   Neurological:      General: No focal deficit present.      Mental Status: She is alert and oriented to person, place, and time.          Last Recorded Vitals  Blood pressure 124/64, pulse 60, temperature 36.4 °C (97.6 °F), temperature source Oral, resp. rate 18, weight 47.2 kg (104 lb), SpO2 95%.    Relevant  Results  Scheduled medications  amLODIPine, 5 mg, oral, Daily  apixaban, 2.5 mg, oral, BID  atorvastatin, 40 mg, oral, Daily  carvedilol, 6.25 mg, oral, BID  cholecalciferol, 1,000 Units, oral, Daily  levETIRAcetam, 750 mg, oral, BID  losartan, 50 mg, oral, Daily  pantoprazole, 20 mg, oral, Daily      Continuous medications     PRN medications     Results for orders placed or performed during the hospital encounter of 07/21/24 (from the past 24 hour(s))   Sars-CoV-2 PCR   Result Value Ref Range    Coronavirus 2019, PCR Detected (A) Not Detected   CBC and Auto Differential   Result Value Ref Range    WBC 8.3 4.4 - 11.3 x10*3/uL    nRBC 0.0 0.0 - 0.0 /100 WBCs    RBC 4.78 4.00 - 5.20 x10*6/uL    Hemoglobin 15.4 12.0 - 16.0 g/dL    Hematocrit 46.1 (H) 36.0 - 46.0 %    MCV 96 80 - 100 fL    MCH 32.2 26.0 - 34.0 pg    MCHC 33.4 32.0 - 36.0 g/dL    RDW 12.3 11.5 - 14.5 %    Platelets 223 150 - 450 x10*3/uL    Neutrophils % 72.9 40.0 - 80.0 %    Immature Granulocytes %, Automated 0.4 0.0 - 0.9 %    Lymphocytes % 15.2 13.0 - 44.0 %    Monocytes % 11.4 2.0 - 10.0 %    Eosinophils % 0.0 0.0 - 6.0 %    Basophils % 0.1 0.0 - 2.0 %    Neutrophils Absolute 6.03 (H) 1.60 - 5.50 x10*3/uL    Immature Granulocytes Absolute, Automated 0.03 0.00 - 0.50 x10*3/uL    Lymphocytes Absolute 1.26 0.80 - 3.00 x10*3/uL    Monocytes Absolute 0.94 (H) 0.05 - 0.80 x10*3/uL    Eosinophils Absolute 0.00 0.00 - 0.40 x10*3/uL    Basophils Absolute 0.01 0.00 - 0.10 x10*3/uL   Comprehensive metabolic panel   Result Value Ref Range    Glucose 135 (H) 74 - 99 mg/dL    Sodium 133 (L) 136 - 145 mmol/L    Potassium 4.9 3.5 - 5.3 mmol/L    Chloride 98 98 - 107 mmol/L    Bicarbonate 28 21 - 32 mmol/L    Anion Gap 12 10 - 20 mmol/L    Urea Nitrogen 17 6 - 23 mg/dL    Creatinine 0.76 0.50 - 1.05 mg/dL    eGFR 71 >60 mL/min/1.73m*2    Calcium 9.0 8.6 - 10.3 mg/dL    Albumin 3.8 3.4 - 5.0 g/dL    Alkaline Phosphatase 91 33 - 136 U/L    Total Protein 7.4 6.4 - 8.2  g/dL    AST 39 9 - 39 U/L    Bilirubin, Total 0.6 0.0 - 1.2 mg/dL    ALT 19 7 - 45 U/L   Urinalysis with Reflex Culture and Microscopic   Result Value Ref Range    Color, Urine Light-Yellow Light-Yellow, Yellow, Dark-Yellow    Appearance, Urine Turbid (N) Clear    Specific Gravity, Urine 1.008 1.005 - 1.035    pH, Urine 6.5 5.0, 5.5, 6.0, 6.5, 7.0, 7.5, 8.0    Protein, Urine NEGATIVE NEGATIVE, 10 (TRACE), 20 (TRACE) mg/dL    Glucose, Urine Normal Normal mg/dL    Blood, Urine NEGATIVE NEGATIVE    Ketones, Urine NEGATIVE NEGATIVE mg/dL    Bilirubin, Urine NEGATIVE NEGATIVE    Urobilinogen, Urine Normal Normal mg/dL    Nitrite, Urine NEGATIVE NEGATIVE    Leukocyte Esterase, Urine NEGATIVE NEGATIVE      XR chest 2 views   Final Result   No interval change from 06/11/2024.             MACRO:   None        Signed by: Mac Flor 7/21/2024 11:22 AM   Dictation workstation:   [id number]              Patient with a past medical history significant for seizures chronic diastolic congestive heart failure history of CVA acid reflux hypertension dyslipidemia and DVT had a recent mechanical fall at home and a humerus fracture who had it repaired and was in rehab.       Assessment/Plan   Principal Problem:    COVID-19  Currently on room air and sats appropriate   Extreme fatigue and weakness  Hold lasix   Nutrition consult  PT/OT     HX of Left humeral neck fracture  -conservative treatment  -Has been in rehab discharge yesterday     HX Lacunar infarct  Patient already on Eliquis     HX Hypertension  Hypertension home meds      HX  Dyslipidemia  Continue statins      HX Atrial fibrillation  Rate controlled  Continue Eliquis     HX Seizure disorder  Stable on Keppra     DVT prophlaysis eliquis       Code Status discussed with daughter states that the paper work is in place from previous admits, no ACP docs noted on EMR review will have  see if any papers need to be filed or added    Ann Marie Allen,  APRN-CNP

## 2024-07-22 PROBLEM — E83.42 HYPOMAGNESEMIA: Status: ACTIVE | Noted: 2024-07-22

## 2024-07-22 LAB
ANION GAP SERPL CALC-SCNC: 12 MMOL/L (ref 10–20)
ATRIAL RATE: 300 BPM
BUN SERPL-MCNC: 21 MG/DL (ref 6–23)
CALCIUM SERPL-MCNC: 8.2 MG/DL (ref 8.6–10.3)
CHLORIDE SERPL-SCNC: 101 MMOL/L (ref 98–107)
CO2 SERPL-SCNC: 27 MMOL/L (ref 21–32)
CREAT SERPL-MCNC: 0.79 MG/DL (ref 0.5–1.05)
EGFRCR SERPLBLD CKD-EPI 2021: 68 ML/MIN/1.73M*2
ERYTHROCYTE [DISTWIDTH] IN BLOOD BY AUTOMATED COUNT: 12.2 % (ref 11.5–14.5)
GLUCOSE SERPL-MCNC: 96 MG/DL (ref 74–99)
HCT VFR BLD AUTO: 39.6 % (ref 36–46)
HGB BLD-MCNC: 13.2 G/DL (ref 12–16)
MAGNESIUM SERPL-MCNC: 1.4 MG/DL (ref 1.6–2.4)
MCH RBC QN AUTO: 31.6 PG (ref 26–34)
MCHC RBC AUTO-ENTMCNC: 33.3 G/DL (ref 32–36)
MCV RBC AUTO: 95 FL (ref 80–100)
NRBC BLD-RTO: 0 /100 WBCS (ref 0–0)
PLATELET # BLD AUTO: 180 X10*3/UL (ref 150–450)
POTASSIUM SERPL-SCNC: 3.4 MMOL/L (ref 3.5–5.3)
Q ONSET: 195 MS
QRS COUNT: 11 BEATS
QRS DURATION: 152 MS
QT INTERVAL: 416 MS
QTC CALCULATION(BAZETT): 436 MS
QTC FREDERICIA: 429 MS
R AXIS: -83 DEGREES
RBC # BLD AUTO: 4.18 X10*6/UL (ref 4–5.2)
SODIUM SERPL-SCNC: 137 MMOL/L (ref 136–145)
T AXIS: 84 DEGREES
T OFFSET: 403 MS
VENTRICULAR RATE: 66 BPM
WBC # BLD AUTO: 4.7 X10*3/UL (ref 4.4–11.3)

## 2024-07-22 PROCEDURE — 96365 THER/PROPH/DIAG IV INF INIT: CPT

## 2024-07-22 PROCEDURE — 2500000001 HC RX 250 WO HCPCS SELF ADMINISTERED DRUGS (ALT 637 FOR MEDICARE OP): Performed by: INTERNAL MEDICINE

## 2024-07-22 PROCEDURE — 97166 OT EVAL MOD COMPLEX 45 MIN: CPT | Mod: GO

## 2024-07-22 PROCEDURE — 97161 PT EVAL LOW COMPLEX 20 MIN: CPT | Mod: GP

## 2024-07-22 PROCEDURE — G0378 HOSPITAL OBSERVATION PER HR: HCPCS

## 2024-07-22 PROCEDURE — 96366 THER/PROPH/DIAG IV INF ADDON: CPT

## 2024-07-22 PROCEDURE — 83735 ASSAY OF MAGNESIUM: CPT | Performed by: INTERNAL MEDICINE

## 2024-07-22 PROCEDURE — 36415 COLL VENOUS BLD VENIPUNCTURE: CPT | Performed by: NURSE PRACTITIONER

## 2024-07-22 PROCEDURE — 96361 HYDRATE IV INFUSION ADD-ON: CPT

## 2024-07-22 PROCEDURE — 2500000001 HC RX 250 WO HCPCS SELF ADMINISTERED DRUGS (ALT 637 FOR MEDICARE OP): Performed by: NURSE PRACTITIONER

## 2024-07-22 PROCEDURE — 85027 COMPLETE CBC AUTOMATED: CPT | Performed by: NURSE PRACTITIONER

## 2024-07-22 PROCEDURE — 2500000004 HC RX 250 GENERAL PHARMACY W/ HCPCS (ALT 636 FOR OP/ED): Performed by: INTERNAL MEDICINE

## 2024-07-22 PROCEDURE — 99222 1ST HOSP IP/OBS MODERATE 55: CPT | Performed by: INTERNAL MEDICINE

## 2024-07-22 PROCEDURE — 2500000002 HC RX 250 W HCPCS SELF ADMINISTERED DRUGS (ALT 637 FOR MEDICARE OP, ALT 636 FOR OP/ED): Performed by: NURSE PRACTITIONER

## 2024-07-22 PROCEDURE — 80048 BASIC METABOLIC PNL TOTAL CA: CPT | Performed by: NURSE PRACTITIONER

## 2024-07-22 RX ORDER — MAGNESIUM SULFATE HEPTAHYDRATE 40 MG/ML
2 INJECTION, SOLUTION INTRAVENOUS ONCE
Status: COMPLETED | OUTPATIENT
Start: 2024-07-22 | End: 2024-07-22

## 2024-07-22 RX ORDER — LANOLIN ALCOHOL/MO/W.PET/CERES
400 CREAM (GRAM) TOPICAL DAILY
Status: DISCONTINUED | OUTPATIENT
Start: 2024-07-22 | End: 2024-07-23 | Stop reason: HOSPADM

## 2024-07-22 RX ORDER — POTASSIUM CHLORIDE 1.5 G/1.58G
20 POWDER, FOR SOLUTION ORAL ONCE
Status: COMPLETED | OUTPATIENT
Start: 2024-07-22 | End: 2024-07-22

## 2024-07-22 ASSESSMENT — COGNITIVE AND FUNCTIONAL STATUS - GENERAL
STANDING UP FROM CHAIR USING ARMS: A LITTLE
MOVING TO AND FROM BED TO CHAIR: A LOT
MOVING FROM LYING ON BACK TO SITTING ON SIDE OF FLAT BED WITH BEDRAILS: A LOT
TURNING FROM BACK TO SIDE WHILE IN FLAT BAD: A LITTLE
MOBILITY SCORE: 10
EATING MEALS: A LITTLE
TOILETING: A LOT
DRESSING REGULAR LOWER BODY CLOTHING: A LOT
PERSONAL GROOMING: A LITTLE
WALKING IN HOSPITAL ROOM: TOTAL
CLIMB 3 TO 5 STEPS WITH RAILING: TOTAL
MOBILITY SCORE: 10
MOVING TO AND FROM BED TO CHAIR: A LITTLE
STANDING UP FROM CHAIR USING ARMS: A LOT
TURNING FROM BACK TO SIDE WHILE IN FLAT BAD: A LOT
DAILY ACTIVITIY SCORE: 13
MOBILITY SCORE: 15
DRESSING REGULAR LOWER BODY CLOTHING: A LOT
PERSONAL GROOMING: A LOT
DAILY ACTIVITIY SCORE: 13
TOILETING: A LOT
DAILY ACTIVITIY SCORE: 14
DRESSING REGULAR UPPER BODY CLOTHING: A LOT
TURNING FROM BACK TO SIDE WHILE IN FLAT BAD: A LOT
HELP NEEDED FOR BATHING: A LOT
DRESSING REGULAR LOWER BODY CLOTHING: A LOT
TOILETING: TOTAL
HELP NEEDED FOR BATHING: A LOT
PERSONAL GROOMING: A LOT
CLIMB 3 TO 5 STEPS WITH RAILING: TOTAL
MOVING FROM LYING ON BACK TO SITTING ON SIDE OF FLAT BED WITH BEDRAILS: A LITTLE
EATING MEALS: A LITTLE
EATING MEALS: A LITTLE
DRESSING REGULAR UPPER BODY CLOTHING: A LOT
DRESSING REGULAR UPPER BODY CLOTHING: A LITTLE
WALKING IN HOSPITAL ROOM: TOTAL
STANDING UP FROM CHAIR USING ARMS: A LOT
MOVING FROM LYING ON BACK TO SITTING ON SIDE OF FLAT BED WITH BEDRAILS: A LOT
MOVING TO AND FROM BED TO CHAIR: A LOT
HELP NEEDED FOR BATHING: A LOT
WALKING IN HOSPITAL ROOM: A LOT
CLIMB 3 TO 5 STEPS WITH RAILING: TOTAL

## 2024-07-22 ASSESSMENT — PAIN SCALES - GENERAL
PAINLEVEL_OUTOF10: 0 - NO PAIN

## 2024-07-22 ASSESSMENT — ACTIVITIES OF DAILY LIVING (ADL)
LACK_OF_TRANSPORTATION: NO
ADL_ASSISTANCE: NEEDS ASSISTANCE
BATHING_ASSISTANCE: MAXIMAL
ADL_ASSISTANCE: NEEDS ASSISTANCE
BATHING_ASSISTANCE: STAND BY

## 2024-07-22 ASSESSMENT — ENCOUNTER SYMPTOMS
CARDIOVASCULAR NEGATIVE: 1
RESPIRATORY NEGATIVE: 1
WEAKNESS: 1
PSYCHIATRIC NEGATIVE: 1
MUSCULOSKELETAL NEGATIVE: 1
GASTROINTESTINAL NEGATIVE: 1
FATIGUE: 1

## 2024-07-22 ASSESSMENT — PAIN - FUNCTIONAL ASSESSMENT
PAIN_FUNCTIONAL_ASSESSMENT: 0-10

## 2024-07-22 NOTE — H&P
History Of Present Illness  Marian Fritz is a 89 y.o. female presenting with generalized weakness.  Past Medical History:   Diagnosis Date    Personal history of other diseases of the circulatory system     History of hypertension    Personal history of other diseases of the nervous system and sense organs 12/16/2013    History of complex partial epilepsy    Pure hypercholesterolemia, unspecified     High cholesterol    Unspecified cataract     Cataracts, both eyes    Unspecified convulsions (Multi)     Seizures     Marian is hard of hearing and not able to give me much history; I spoke to dtr. She was recently here with humerus fracture; went to [facility] and then [facility]. Dtr went to pick her up from [facility] the other day, and she could not even get in/out of the car. Marian had been doing some walking with walker at the [facility], but dtr did not see her walk on the day of discharge. Work-up here shows that she is positive for covid. VS are in normal range.     Past Surgical History:   Procedure Laterality Date    CATARACT EXTRACTION  12/01/2014    Cataract Surgery    CT ANGIO NECK  9/16/2018    CT NECK ANGIO W AND WO IV CONTRAST 9/16/2018 [facility] EMERGENCY LEGACY    CT HEAD ANGIO W AND WO IV CONTRAST  9/16/2018    CT HEAD ANGIO W AND WO IV CONTRAST 9/16/2018 [facility] EMERGENCY LEGACY    MR HEAD ANGIO WO IV CONTRAST  11/28/2018    MR HEAD ANGIO WO IV CONTRAST [place] [facility] [place]    MR NECK ANGIO WO IV CONTRAST  11/28/2018    MR NECK ANGIO WO IV CONTRAST [place]2018 [facility] [place]    OTHER SURGICAL HISTORY  12/01/2014    Simple Mastectomy Right Breast     Social History     Tobacco Use    Smoking status: Never    Smokeless tobacco: Never   Vaping Use    Vaping status: Never Used   Substance Use Topics    Alcohol use: Never    Drug use: Never     Family History   Problem Relation Name Age of Onset    Heart failure Mother      Tracheoesophageal fistual Father      Pancreatic cancer Sister      Coronary artery disease  Brother      Pancreatic cancer Brother       Allergies  Bee venom protein (honey bee)    Review of Systems   Constitutional:  Positive for fatigue.   HENT: Negative.     Respiratory: Negative.     Cardiovascular: Negative.    Gastrointestinal: Negative.    Genitourinary: Negative.    Musculoskeletal: Negative.    Skin: Negative.    Neurological:  Positive for weakness.   Psychiatric/Behavioral: Negative.         Last Recorded Vitals  Blood pressure 117/59, pulse 61, temperature 37.1 °C (98.7 °F), temperature source Temporal, resp. rate 17, weight 47.2 kg (104 lb), SpO2 93%.  Physical Exam  Cardiovascular:      Rate and Rhythm: Normal rate and regular rhythm.      Heart sounds: Normal heart sounds.   Pulmonary:      Breath sounds: Normal breath sounds.   Abdominal:      General: Bowel sounds are normal.      Palpations: Abdomen is soft.   Musculoskeletal:         General: Normal range of motion.   Neurological:      General: No focal deficit present.      Mental Status: She is alert. Mental status is at baseline. She is disoriented.   Psychiatric:         Mood and Affect: Mood normal.           Relevant Results           Assessment/Plan   Principal Problem:    COVID-19  Active Problems:    A-fib (Multi)    Benign essential HTN    Hypomagnesemia  - IV fluids, replete electrolytes  - do not see need or risk/benefit for dex or remdesivir  - plan for SNF on dc         I spent 50 minutes in the professional and overall care of this patient.      Rd An MD

## 2024-07-22 NOTE — PROGRESS NOTES
Occupational Therapy    Evaluation    Patient Name: Marian Fritz  MRN: [id number]  Today's Date: 7/22/2024  Time Calculation  Start Time: 0849  Stop Time: 0907  Time Calculation (min): 18 min        Assessment:  OT Assessment: Pt presents with deficits in balance and strength, limiting participation in self care and fxnl mob. pt is unable to d/c home in current condition. pt requires ongoing OT services, and OT is recommending mod intensity OT at this time.  Prognosis: Good  Barriers to Discharge: None  Evaluation/Treatment Tolerance: Patient tolerated treatment well  Medical Staff Made Aware: Yes  End of Session Communication: Bedside nurse  End of Session Patient Position: Up in chair, Alarm on (daughter present)  OT Assessment Results: Decreased ADL status, Decreased cognition, Decreased endurance, Decreased functional mobility, Decreased safe judgment during ADL  Prognosis: Good  Barriers to Discharge: None  Evaluation/Treatment Tolerance: Patient tolerated treatment well  Medical Staff Made Aware: Yes  Strengths: Support of Caregivers, Rehab experience, Premorbid level of function  Barriers to Participation: Comorbidities  Plan:  Treatment Interventions: ADL retraining, Functional transfer training, Endurance training, Cognitive reorientation  OT Frequency: 3 times per week  OT Discharge Recommendations: Moderate intensity level of continued care  OT Recommended Transfer Status: Moderate assist, Assist of 1  OT - OK to Discharge: Yes  Treatment Interventions: ADL retraining, Functional transfer training, Endurance training, Cognitive reorientation    Subjective   Current Problem:  1. COVID-19          General:  General  Reason for Referral: Pt is a 89 y/o presenting with weakness. Pt dx with COVID19.pt was hospitalized 1 month ago for humerus fx. Presented to ED within 2 days of d/c home from SNF.  Referred By: Dr. An  Past Medical History Relevant to Rehab:   Past Medical History:   Diagnosis Date     Personal history of other diseases of the circulatory system     History of hypertension    Personal history of other diseases of the nervous system and sense organs 12/16/2013    History of complex partial epilepsy    Pure hypercholesterolemia, unspecified     High cholesterol    Unspecified cataract     Cataracts, both eyes    Unspecified convulsions (Multi)     Seizures       Family/Caregiver Present: Yes  Caregiver Feedback: daugther present, participates in evaluation  Co-Treatment: PT  Co-Treatment Reason: maximum patient safety and outcomes  Prior to Session Communication: Bedside nurse  Patient Position Received: Bed, 3 rail up, Alarm on  General Comment: pt pleasant and cooperative, very [place], daughter assists with eval  Precautions:  Hearing/Visual Limitations: very [place]  UE Weight Bearing Status: Weight Bearing as Tolerated (per daughter, got upgraded to WBAT when in rehab)  Medical Precautions: Fall precautions  Pain:  Pain Assessment  Pain Assessment: 0-10    Objective   Cognition:  Orientation Level: Disoriented to situation, Disoriented to time      Home Living:  Type of Home: House  Lives With: Adult children  Home Adaptive Equipment: Walker rolling or standard, Cane  Home Layout: Multi-level, Able to live on main level with bedroom/bathroom  Home Access: Stairs to enter without rails  Entrance Stairs-Rails: None  Entrance Stairs-Number of Steps: 3  Bathroom Shower/Tub: Walk-in shower  Bathroom Toilet: Standard  Bathroom Equipment: Grab bars in shower, Shower chair with back  Home Living Comments: daughter supervises bathing at baseline, reports pt has to be able to go to the bathroom on her own to be at home, as she is alone during the day.  Prior Function:  Level of [place]: Independent with ADLs and functional transfers, Independent with homemaking with ambulation (ananyather supervises bathing, Ind with RW at baseline)  Receives Help From: Family  ADL Assistance: Needs assistance  Bath: Stand  by  Homemaking Assistance: Needs assistance  Prior Function Comments: can prepare coffee/ light meals, family does majority of iADLs.  IADL History:  Current License: No  ADL:  Eating Assistance: Stand by  Eating Deficit: Setup  Grooming Assistance: Stand by  Grooming Deficit: Setup  Bathing Assistance: Maximal  Bathing Deficit: Left upper leg, Right upper leg, Buttocks, Perineal area, Left lower leg including foot, Right lower leg including foot  UE Dressing Assistance: Minimal  LE Dressing Assistance: Maximal  LE Dressing Deficit: Thread LLE into underwear, Pull up over hips, Thread RLE into underwear, Thread LLE into pants, Thread RLE into pants (no initiation of task, declines to attempt (daughter reporting she was dressing herself when d/c from SNF 3 days ago))  Toileting Assistance with Device: Maximal  Toileting Deficit: Use of bedpan/urinal setup, Increased time to complete, Clothing management up, Clothing management down, Perineal hygiene, Incontinent  Activity Tolerance:  Endurance: Tolerates 10 - 20 min exercise with multiple rests  Bed Mobility/Transfers: Bed Mobility  Bed Mobility: Yes  Bed Mobility 1  Bed Mobility 1: Supine to sitting  Level of Assistance 1: Maximum assistance  Bed Mobility Comments 1: assist with trunk and LEs    Transfers  Transfer: Yes  Transfer 1  Transfer From 1: Bed to  Transfer to 1: Chair with arms  Technique 1: Stand pivot  Transfer Device 1: Walker  Transfer Level of Assistance 1: Minimum assistance  Transfers 2  Transfer From 2: Bed to  Transfer to 2: Stand  Technique 2: Sit to stand, Stand to sit  Transfer Device 2: Walker  Transfer Level of Assistance 2: Contact guard  Trials/Comments 2: cues for hand placement and sequencing    Vision:Vision - Basic Assessment  Current Vision: Wears glasses only for reading  Coordination:  Movements are Fluid and Coordinated: Yes   Hand Function:  Gross Grasp: Functional  Coordination: Functional  Extremities: RUE   RUE : Within  Functional Limits and LUE   LUE: Within Functional Limits    Outcome Measures:[facility] Daily Activity  Putting on and taking off regular lower body clothing: A lot  Bathing (including washing, rinsing, drying): A lot  Putting on and taking off regular upper body clothing: A little  Toileting, which includes using toilet, bedpan or urinal: Total  Taking care of personal grooming such as brushing teeth: A little  Eating Meals: A little  Daily Activity - Total Score: 14    Education Documentation  Body Mechanics, taught by Cathleen Lanza OT at 7/22/2024 11:08 AM.  Learner: Family, Patient  Readiness: Acceptance  Method: Explanation  Response: Verbalizes Understanding, Needs Reinforcement    ADL Training, taught by Cathleen Lanza OT at 7/22/2024 11:08 AM.  Learner: Family, Patient  Readiness: Acceptance  Method: Explanation  Response: Verbalizes Understanding, Needs Reinforcement    Education Comments  No comments found.      Goals:  Encounter Problems       Encounter Problems (Active)       ADLs       Patient with complete upper body dressing with supervision level of assistance donning and doffing all UE clothes with PRN adaptive equipment while supported sitting       Start:  07/22/24    Expected End:  08/05/24            Patient with complete lower body dressing with contact guard assist level of assistance donning and doffing all LE clothes  with PRN adaptive equipment while supported sitting       Start:  07/22/24    Expected End:  08/05/24            Patient will complete daily grooming tasks brushing teeth and washing face/hair with set-up level of assistance and PRN adaptive equipment while supported sitting.       Start:  07/22/24    Expected End:  08/05/24            Patient will complete toileting including hygiene clothing management/hygiene with contact guard assist level of assistance and raised toilet seat and grab bars.       Start:  07/22/24    Expected End:  08/05/24               TRANSFERS        Patient will perform bed mobility contact guard assist level of assistance and bed rails in order to improve safety and independence with mobility       Start:  07/22/24    Expected End:  08/05/24            Patient will complete functional transfer to chair with arms with least restrictive device with supervision level of assistance.       Start:  07/22/24    Expected End:  08/05/24

## 2024-07-22 NOTE — CARE PLAN
The patient's goals for the shift include      The clinical goals for the shift include keep pt hds      Problem: Skin  Goal: Participates in plan/prevention/treatment measures  Outcome: Progressing  Goal: Prevent/manage excess moisture  Outcome: Progressing  Goal: Prevent/minimize sheer/friction injuries  Outcome: Progressing  Goal: Promote/optimize nutrition  Outcome: Progressing  Goal: Promote skin healing  Outcome: Progressing     Problem: Fall/Injury  Goal: Not fall by end of shift  Outcome: Progressing  Goal: Be free from injury by end of the shift  Outcome: Progressing  Goal: Verbalize understanding of personal risk factors for fall in the hospital  Outcome: Progressing  Goal: Verbalize understanding of risk factor reduction measures to prevent injury from fall in the home  Outcome: Progressing

## 2024-07-22 NOTE — PROGRESS NOTES
7/22/2024 12:11 PM I spoke to patient's daughter. Patient lives with her daughter. She was at  [facility] then went to [facility]. She was discharged from [facility] two days ago. Patient has Medicare and is observation status. I will send a referral to  [facility] and [facility]. Lanie BRANTLEY

## 2024-07-22 NOTE — CONSULTS
INFECTIOUS DISEASE INPATIENT INITIAL CONSULTATION    Referred By: Ann Marie Allen    Reason For Consult:  connect with new coivd after d/c from rehab severly weak ? role for any  anti virals ?     HPI:  This is a 89 y.o. female with PMH of CVA, seizures, chronic diastolic HF, HTN, DLD, DVT, recent fall with humerus fracture in rehab who presented with weakness.    Has some mild cough. Main issues is weakness/fatigue. Positive for COVID-19. Limited history from patient due to weakness. Is awake and giving very short to no answers for questions.     No fever/leukocytosis and no hypoxia. Appears to have some chronic atelectasis on CXR and no acute infiltrate.    Allergies:  Bee venom protein (honey bee)     Vitals (Last 24 Hours):  Heart Rate:  [59-79]   Temp:  [36.4 °C (97.6 °F)-37.1 °C (98.8 °F)]   Resp:  [16-18]   BP: (108-146)/(51-64)   Weight:  [47.2 kg (104 lb)]   SpO2:  [93 %-96 %]      PHYSICAL EXAM:  Gen - NAD, in bed, weak, not on O2 support  Heart - RRR  Lungs - clear bilaterally, no wheezing  Abd - soft, no ttp, BS present  Skin - no rash    MEDS:    Current Facility-Administered Medications:     amLODIPine (Norvasc) tablet 5 mg, 5 mg, oral, Daily, VALENTE Aceves, 5 mg at 07/21/24 1758    apixaban (Eliquis) tablet 2.5 mg, 2.5 mg, oral, BID, VALENTE Aceves, 2.5 mg at 07/21/24 2139    atorvastatin (Lipitor) tablet 40 mg, 40 mg, oral, Daily, VALENTE Aceves, 40 mg at 07/21/24 1758    carvedilol (Coreg) tablet 6.25 mg, 6.25 mg, oral, BID, VALENTE Aceves, 6.25 mg at 07/21/24 2139    cholecalciferol (Vitamin D-3) tablet 1,000 Units, 1,000 Units, oral, Daily, VALENTE Aceves, 1,000 Units at 07/21/24 1758    levETIRAcetam (Keppra) tablet 750 mg, 750 mg, oral, BID, VALENTE Aceves, 750 mg at 07/21/24 2139    losartan (Cozaar) tablet 50 mg, 50 mg, oral, Daily, VALENTE Aceves, 50 mg at 07/21/24 1758    pantoprazole (ProtoNix) EC tablet 20 mg,  20 mg, oral, Daily, Ann Marie Allen, APRN-CNP, 20 mg at 07/21/24 3348     LABS:  Lab Results   Component Value Date    WBC 6.8 07/21/2024    HGB 13.4 07/21/2024    HCT 39.0 07/21/2024    MCV 96 07/21/2024     07/21/2024      Results from last 72 hours   Lab Units 07/21/24  1030   SODIUM mmol/L 133*   POTASSIUM mmol/L 4.9   CHLORIDE mmol/L 98   CO2 mmol/L 28   BUN mg/dL 17   CREATININE mg/dL 0.76   GLUCOSE mg/dL 135*   CALCIUM mg/dL 9.0   ANION GAP mmol/L 12   EGFR mL/min/1.73m*2 71     Results from last 72 hours   Lab Units 07/21/24  1030   ALK PHOS U/L 91   BILIRUBIN TOTAL mg/dL 0.6   PROTEIN TOTAL g/dL 7.4   ALT U/L 19   AST U/L 39   ALBUMIN g/dL 3.8     Estimated Creatinine Clearance: 30.4 mL/min (by C-G formula based on SCr of 0.76 mg/dL).     IMAGING:  CXR 7/21  FINDINGS:   A dual lead intracardiac device is stable in position.     The heart is mildly enlarged and unchanged. There is faint   ground-glass density in the lung bases which is similar to the   previous study and curvilinear atelectasis greater on the left. There   is no new airspace consolidation, pleural effusion, or pneumothorax.   The osseous structures are diffusely and severely demineralized.     ASSESSMENT/PLAN:    COVID-19 Infection - symptomatic but no viral PNA/hypoxia    If will be admitted for about 3D can consider IV Remdesivir for 3D course. No need for steroids. Else could discharge with Paxlovid dosed for CKD and hold statin. Would also need to hold Apixaban so if this is not considered safe for her then I would forego Paxlovid as that is not very critical to use at this time.    Will sign off. Please call back with questions. Thanks!    Rocky Nicolas MD  [facility]  Office #[phone]

## 2024-07-22 NOTE — PROGRESS NOTES
07/22/24 1625   Discharge Planning   Living Arrangements Children  (lives with daughter)   Support Systems Children   Assistance Needed recently discharge home from [facility] -2 days ago-d/t weakness now need snf   Type of Residence Private residence  (demo correct)   Number of Stairs to Enter Residence 3   Number of Stairs Within Residence 0   Home or Post Acute Services Post acute facilities (Rehab/SNF/etc)   Type of Post Acute Facility Services Skilled nursing   Expected Discharge Disposition SNF   Does the patient need discharge transport arranged? Yes   RoundTrip coordination needed? Yes   Has discharge transport been arranged? No   Financial Resource Strain   How hard is it for you to pay for the very basics like food, housing, medical care, and heating? Not hard   Housing Stability   In the last 12 months, was there a time when you were not able to pay the mortgage or rent on time? N   In the past 12 months, how many times have you moved where you were living? 1   At any time in the past 12 months, were you homeless or living in a shelter (including now)? N   Transportation Needs   In the past 12 months, has lack of transportation kept you from medical appointments or from getting medications? no   In the past 12 months, has lack of transportation kept you from meetings, work, or from getting things needed for daily living? No   Patient Choice   Patient / Family choosing to utilize agency / facility established prior to hospitalization Yes     Care Coordinator Note:  SW spoke with patient's  daughter to confirm discharge plan, patient's daughter concern about patient's recent bout of weakness  Plan: 3D can consider IV Remdesivir for 3D course. D/t covid  Status: Observation   Payor: Medicare  Disposition: snf placement    Merissa NEWSOME, RN [facility]

## 2024-07-22 NOTE — PROGRESS NOTES
Physical Therapy    Physical Therapy Evaluation    Patient Name: Marian Fritz  MRN: [id number]  Today's Date: 7/22/2024   Time Calculation  Start Time: 0848  Stop Time: 0906  Time Calculation (min): 18 min    Assessment/Plan   PT Assessment  PT Assessment Results: Decreased strength, Decreased endurance, Impaired balance, Decreased mobility  Rehab Prognosis: Good  Evaluation/Treatment Tolerance: Patient tolerated treatment well  Medical Staff Made Aware: Yes  Strengths: Support of Caregivers, Rehab experience, Premorbid level of function  Barriers to Participation: Comorbidities  End of Session Communication: Bedside nurse  Assessment Comment: Pt demonstrating deficits in generalized strength, endurance and balance as well as increased pain resulting in impaired functional mobility, gait and self care. Due to the impairments listed above, pt would benefit from skilled physical therapy services in acute care setting as well as additional therapy in MOD intensity setting with 24/7 supervision and assistance  End of Session Patient Position: Up in chair, Alarm on  IP OR SWING BED PT PLAN  Inpatient or Swing Bed: Inpatient  PT Plan  Treatment/Interventions: Bed mobility, Transfer training, Gait training, Stair training, Balance training, Neuromuscular re-education, Strengthening, Endurance training, Therapeutic exercise, Therapeutic activity, Home exercise program  PT Plan: Ongoing PT  PT Frequency: 4 times per week  PT Discharge Recommendations: Moderate intensity level of continued care  Equipment Recommended upon Discharge:  (owns FWW)  PT Recommended Transfer Status: Assist x1  PT - OK to Discharge: Yes (PT POC initiated this date)      Subjective   General Visit Information:  General  Reason for Referral: Pt is a 89 yo female presenting due to generalized weakness and fatigue - found to be + for COVID-19. ~1 month ago pt had mechanical fall resulting in L humerus fracture with conservative approach - pt s/p acute  rehab and subsequent SNF stay and was home 1 day prior to this hospital admission  Referred By: Ann Marie Allen CNP  Past Medical History Relevant to Rehab:   Past Medical History:   Diagnosis Date    Personal history of other diseases of the circulatory system     History of hypertension    Personal history of other diseases of the nervous system and sense organs 12/16/2013    History of complex partial epilepsy    Pure hypercholesterolemia, unspecified     High cholesterol    Unspecified cataract     Cataracts, both eyes    Unspecified convulsions (Multi)     Seizures       Family/Caregiver Present: Yes  Caregiver Feedback: pt's daughter present and receptive  Co-Treatment: OT  Co-Treatment Reason: for maximal pt safety and participation  Prior to Session Communication: Bedside nurse  Patient Position Received: Bed, 3 rail up, Alarm on  General Comment: Pt pleasant and agreeable to PT/OT evalautions. Pt significantly [place] so most subjective info taken from daughter in room  Home Living:  Home Living  Type of Home: House  Lives With: Adult children (daughter and son-in-law)  Home Adaptive Equipment: Walker rolling or standard, Cane  Home Layout: Multi-level (1st floor set up)  Home Access: Stairs to enter without rails  Entrance Stairs-Rails: None  Entrance Stairs-Number of Steps: 3  Bathroom Shower/Tub: Walk-in shower  Bathroom Toilet: Standard  Bathroom Equipment: Grab bars in shower, Shower chair with back  Home Living Comments: daughter supervises bathing at baseline, reports pt has to be able to go to the bathroom on her own to be at home, as she is alone during the day.  Prior Level of Function:  Prior Function Per Pt/Caregiver Report  Level of [place]:  (assist with bathing and ind for dressing at baseline. Per dtr, pt was emerging mod ind for dressing at rehab prior to onset of weakness)  Receives Help From: Family  ADL Assistance: Needs assistance (assist with bathing and intermittently with dressing.  "Ind toileting)  Ambulatory Assistance: Independent (FWW)  Prior Function Comments: son-in-law assist with stairs to access home. Assist with mobility in community. Ind using FWW in home at baseline  Precautions:  Precautions  Hearing/Visual Limitations: very [place]  UE Weight Bearing Status:  (Daughter reports pt cleared by orthopedic physician (Dr. Evans) to \"bear weight on FWW\" \"I think they said as tolerated\")  Medical Precautions: Fall precautions  Precautions Comment: Covid-19 - Droplet Plus Precautions  Vital Signs:       Objective   Pain:  Pain Assessment  Pain Assessment: 0-10  0-10 (Numeric) Pain Score: 0 - No pain  Cognition:  Cognition  Overall Cognitive Status: Impaired at baseline  Orientation Level: Disoriented to situation, Disoriented to time  Following Commands: Follows one step commands with repetition  Attention: Exceptions to WFL  Memory: Exceptions to WFL  Insight: Mild  Impulsive: Within functional limits    General Assessments:  Activity Tolerance  Endurance: Tolerates 10 - 20 min exercise with multiple rests    Sensation  Light Touch: No apparent deficits    Coordination  Movements are Fluid and Coordinated: Yes    Static Sitting Balance  Static Sitting-Balance Support: No upper extremity supported, Feet supported  Static Sitting-Level of Assistance: Minimum assistance, Close supervision (initial min A due to posterior lean/LOB progressing to SBA)  Static Sitting-Comment/Number of Minutes: 3 min    Static Standing Balance  Static Standing-Balance Support: Bilateral upper extremity supported (FWW)  Static Standing-Level of Assistance: Contact guard  Static Standing-Comment/Number of Minutes: 1 min  Functional Assessments:  Bed Mobility  Bed Mobility: Yes  Bed Mobility 1  Bed Mobility 1: Supine to sitting  Level of Assistance 1: Maximum assistance  Bed Mobility Comments 1: assist with trunk and LEs    Transfers  Transfer: Yes  Transfer 1  Transfer From 1: Sit to  Transfer to 1: Stand  Technique 1: " Sit to stand, Stand to sit  Transfer Device 1: Gait belt, Walker  Transfer Level of Assistance 1: Contact guard  Trials/Comments 1: VCs for hand placement    Ambulation/Gait Training  Ambulation/Gait Training Performed: Yes  Ambulation/Gait Training 1  Surface 1: Level tile  Device 1: Rolling walker  Gait Support Devices: Gait belt  Assistance 1: Minimum assistance (min A to maintain balance. Posterior LOB noted approaching chair surface requiring min A to reestablish balance to enable closer positioning to chair before sitting)  Quality of Gait 1: Shuffling gait, Decreased step length, Forward flexed posture  Comments/Distance (ft) 1: 3ft  Extremity/Trunk Assessments:  RLE   RLE : Within Functional Limits  LLE   LLE : Within Functional Limits  Outcome Measures:  [facility] Basic Mobility  Turning from your back to your side while in a flat bed without using bedrails: A little  Moving from lying on your back to sitting on the side of a flat bed without using bedrails: A little  Moving to and from bed to chair (including a wheelchair): A little  Standing up from a chair using your arms (e.g. wheelchair or bedside chair): A little  To walk in hospital room: A lot  Climbing 3-5 steps with railing: Total  Basic Mobility - Total Score: 15    Encounter Problems       Encounter Problems (Active)       Balance       LTG - Patient will tolerate standing       Start:  07/22/24    Expected End:  08/05/24       SUP dynamic balance activities >2 min FWW            Mobility       STG - Patient will ambulate       Start:  07/22/24    Expected End:  08/05/24       SUP using FWW >50ft            PT Transfers       STG - Patient will perform bed mobility       Start:  07/22/24    Expected End:  08/05/24       Mod Ind         STG - Patient will transfer sit to and from stand       Start:  07/22/24    Expected End:  08/05/24       Mod Ind                Education Documentation  Precautions, taught by Ang Cuevas, PT at 7/22/2024  11:22 AM.  Learner: Family, Patient  Readiness: Acceptance  Method: Explanation  Response: Verbalizes Understanding, Needs Reinforcement    Body Mechanics, taught by Ang Cuevas, PT at 7/22/2024 11:22 AM.  Learner: Family, Patient  Readiness: Acceptance  Method: Explanation  Response: Verbalizes Understanding, Needs Reinforcement    Mobility Training, taught by Ang Cuevas, PT at 7/22/2024 11:22 AM.  Learner: Family, Patient  Readiness: Acceptance  Method: Explanation  Response: Verbalizes Understanding, Needs Reinforcement    Education Comments  No comments found.

## 2024-07-23 VITALS
DIASTOLIC BLOOD PRESSURE: 58 MMHG | OXYGEN SATURATION: 98 % | TEMPERATURE: 98.6 F | BODY MASS INDEX: 20.31 KG/M2 | WEIGHT: 104 LBS | SYSTOLIC BLOOD PRESSURE: 123 MMHG | RESPIRATION RATE: 18 BRPM | HEART RATE: 70 BPM

## 2024-07-23 PROCEDURE — 99239 HOSP IP/OBS DSCHRG MGMT >30: CPT | Performed by: INTERNAL MEDICINE

## 2024-07-23 PROCEDURE — 2500000004 HC RX 250 GENERAL PHARMACY W/ HCPCS (ALT 636 FOR OP/ED): Performed by: INTERNAL MEDICINE

## 2024-07-23 PROCEDURE — 2500000002 HC RX 250 W HCPCS SELF ADMINISTERED DRUGS (ALT 637 FOR MEDICARE OP, ALT 636 FOR OP/ED): Performed by: NURSE PRACTITIONER

## 2024-07-23 PROCEDURE — 2500000001 HC RX 250 WO HCPCS SELF ADMINISTERED DRUGS (ALT 637 FOR MEDICARE OP): Performed by: NURSE PRACTITIONER

## 2024-07-23 PROCEDURE — G0378 HOSPITAL OBSERVATION PER HR: HCPCS

## 2024-07-23 ASSESSMENT — PAIN SCALES - GENERAL: PAINLEVEL_OUTOF10: 0 - NO PAIN

## 2024-07-23 ASSESSMENT — PAIN - FUNCTIONAL ASSESSMENT: PAIN_FUNCTIONAL_ASSESSMENT: 0-10

## 2024-07-23 NOTE — PROGRESS NOTES
7/23/2024 11:16 AM Patient's daughter informed that discharge transport arranged for 2:30 PM to [facility]. [facility] informed thru careport that patient is currently observation status. Lanie BRANTLEY

## 2024-07-23 NOTE — DISCHARGE SUMMARY
Admitting Provider: Destiny Holley MD  Discharge Provider: Rd An MD  Primary Care Physician at Discharge: Pro Gomez [phone]   Admission Date: 7/21/2024     Discharge Date: 7/23/2024  Current Planned Discharge Disposition: Skilled Nursing Facility (SNF)    Discharge Diagnoses  Principal Problem:    COVID-19  Active Problems:    A-fib (Multi)    Benign essential HTN    Hypomagnesemia      Hospital Course  Marian Fritz is a 89 y.o. female presenting with generalized weakness.     Marian is hard of hearing and not able to give me much history; I spoke to dtr. Marian was recently here with humerus fracture; no surgery was done; went to [facility] and then SNF. Dtr went to pick her up from SNF the other day, and she could not even get in/out of the car. Marian had been doing some walking with walker at the SNF, but dtr did not see her walk on the day of discharge. Family brought her to our ED. Work-up here shows that she is positive for covid. Electrolytes slightly low. VS are in normal range. She was assessed by PT. She felt better on the day of discharge. Discharged to SNF in stable condition.     Test Results Pending At Discharge  Pending Labs       Order Current Status    Extra Urine Gray Tube Collected (07/21/24 1112)    Urinalysis with Reflex Culture and Microscopic In process            Pertinent Physical Exam At Time of Discharge  /58 (BP Location: Right arm, Patient Position: Lying)   Pulse 70   Temp 37 °C (98.6 °F)   Resp 18   Wt 47.2 kg (104 lb)   SpO2 98%   BMI 20.31 kg/m²   Physical Exam  Cardiovascular:      Rate and Rhythm: Normal rate and regular rhythm.      Heart sounds: Normal heart sounds.   Pulmonary:      Breath sounds: Normal breath sounds.   Abdominal:      General: Bowel sounds are normal.      Palpations: Abdomen is soft.   Musculoskeletal:         General: Normal range of motion.   Neurological:      General: No focal deficit present.      Mental Status: She  is alert and oriented to person, place, and time.   Psychiatric:         Mood and Affect: Mood normal.         Home Medications     Medication List      CHANGE how you take these medications     furosemide 20 mg tablet; Commonly known as: Lasix; Take 0.5-1 tablets   (10-20 mg) by mouth once daily.; What changed: how much to take, when to   take this     CONTINUE taking these medications     amLODIPine 5 mg tablet; Commonly known as: Norvasc   apixaban 2.5 mg tablet; Commonly known as: Eliquis; Take 1 tablet (2.5   mg) by mouth 2 times a day.   atorvastatin 40 mg tablet; Commonly known as: Lipitor; Take 1 tablet (40   mg) by mouth once daily.   biotin 5 mg capsule   carvedilol 6.25 mg tablet; Commonly known as: Coreg; take one tablet   every morning and evening.   cholecalciferol 25 MCG (1000 UT) tablet; Commonly known as: Vitamin D-3   levETIRAcetam 750 mg tablet; Commonly known as: Keppra; Take 1 tablet   (750 mg) by mouth 2 times a day.   losartan 50 mg tablet; Commonly known as: Cozaar; Take 1 tablet (50 mg)   by mouth once daily.   pantoprazole 20 mg EC tablet; Commonly known as: ProtoNix; Take 1 tablet   (20 mg) by mouth once daily.   potassium chloride CR 10 mEq ER tablet; Commonly known as: Klor-Con;   Take 1 tablet (10 mEq) by mouth every other day.   PreserVision AREDS 4,296 mcg-226 mg-90 mg capsule; Generic drug:   vitamins A,C,E-zinc-copper       Outpatient Follow-Up  Future Appointments   Date Time Provider Department [place]   8/19/2024  4:00 PM Pro Gomez MD [id number] [facility]   9/4/2024  1:00 PM Estela Pelaez PA-C [id number] [facility]   4/28/2025  1:00 PM Randall Becker MD [id number] [facility]       Rd An MD    I spent more than 30 min coordinating this patient's discharge.

## 2024-07-23 NOTE — PROGRESS NOTES
7/23/2024 11:06 AM Daughter informed that transportation is being arranged for later this afternoon to [facility]. Lanie BRANTLEY

## 2024-07-25 ENCOUNTER — NURSING HOME VISIT (OUTPATIENT)
Dept: POST ACUTE CARE | Facility: EXTERNAL LOCATION | Age: 89
End: 2024-07-25
Payer: MEDICARE

## 2024-07-25 DIAGNOSIS — U07.1 COVID-19: Primary | ICD-10-CM

## 2024-07-25 DIAGNOSIS — R53.81 PHYSICAL DECONDITIONING: ICD-10-CM

## 2024-07-25 DIAGNOSIS — S42.412D CLOSED SUPRACONDYLAR FRACTURE OF LEFT HUMERUS WITH ROUTINE HEALING, SUBSEQUENT ENCOUNTER: ICD-10-CM

## 2024-07-25 DIAGNOSIS — E83.42 HYPOMAGNESEMIA: ICD-10-CM

## 2024-07-25 DIAGNOSIS — H91.93 BILATERAL HEARING LOSS, UNSPECIFIED HEARING LOSS TYPE: ICD-10-CM

## 2024-07-25 DIAGNOSIS — I48.91 ATRIAL FIBRILLATION, UNSPECIFIED TYPE (MULTI): ICD-10-CM

## 2024-07-25 DIAGNOSIS — I10 BENIGN ESSENTIAL HTN: ICD-10-CM

## 2024-07-25 DIAGNOSIS — Z95.0 PACEMAKER: ICD-10-CM

## 2024-07-25 DIAGNOSIS — R53.1 WEAKNESS: ICD-10-CM

## 2024-07-25 PROCEDURE — 99305 1ST NF CARE MODERATE MDM 35: CPT | Performed by: INTERNAL MEDICINE

## 2024-07-25 NOTE — LETTER
Patient: Marian Fritz  : 1926    Encounter Date: 2024    Nursing Home  History & Physical Visit    Name: Marian Fritz  MRN: [id number]  YOB: 1926  Date of Service: 2024      History of Present Illness  Pt known to me from her previous care over at this facility recently when she was admitted to L humerus fracture. No surgery was done. She went home and with in few days was back in ER with generalized weakness. And at [facility] she was found to be covid 19 positive . She was managed conservatively at [facility] and now she is her with [facility] rehab.   Daughter is present in room during exam .    Patient denies any shortness of breath, PND, orthopnea, chest pain , palpitation, syncope or edema in legs  patient denies any abdominal pain, tenderness, nausea, vomiting, change in bowel habits or blood in stool.    Review of Systems  REVIEW OF SYSTEMS:   All other systems have been reviewed and are negative in relation to patient's complaint and other than what is mentioned in History of present illness.     Vital Signs  [facility]   Temperature  97.5 °F  2024 16:53  Pulse  63 per minute  2024 16:53  Respirations  16 per minute  2024 16:53  Blood Pressure  140 / 73 mmHg  2024 16:53  O2 Saturation  97 %  2024 16:53  Weight  106 lbs / Admission BMI: 20.7  2024 16:39    Physical Exam  Vitals noted   Not in acute distress  Conj Pink, No icterus  Neck: No Cervical LN enlargement, No Thyroid enlargement   Lungs: good air entry bilaterally, no rales or rhonchi  CVS: S1 S2 + , no S3. No loud heart murmur heard.   Abdomen: Soft, non tender , BS +. no organomegaly , no CVA tenderness  CNS: Pt is alert very hard of hearing . , moving all 4 extremities. no motor weakness or abnormal movements noted on gross examination.  No spine tenderness  Extremities: No edema, No calf tenderness, Kamryn's sign negative.     Assessment/Plan:    1. COVID-19 -clinically it is getting better    2.  Weakness    3. Physical deconditioning    4. Atrial fibrillation, unspecified type (Multi) -hx of. Has pacemaker   5. Benign essential HTN -controlled     6. Hypomagnesemia -hx of.    7. Closed supracondylar fracture of left humerus with routine healing, subsequent encounter    8. Bilateral hearing loss, unspecified hearing loss type    9. Pacemaker         Plan:     Available medical records reviewed . Patient was examined and detailed History and physical done . All questions answered to patient's satisfaction . Total time for chart reviewing , detailed examination and coordinating care with patient was > 25 minutes.     Patient is doing well.   Continue OT/PT Rehab.   Current medications are effective. advised to continue current medications.  Will continue to follow   Patient felt satisfied with plan      Electronically Signed By: Pantera Cotto MD   7/28/24  9:54 PM

## 2024-07-29 ENCOUNTER — NURSING HOME VISIT (OUTPATIENT)
Dept: POST ACUTE CARE | Facility: EXTERNAL LOCATION | Age: 89
End: 2024-07-29
Payer: MEDICARE

## 2024-07-29 DIAGNOSIS — R53.81 PHYSICAL DECONDITIONING: ICD-10-CM

## 2024-07-29 DIAGNOSIS — S42.412D CLOSED SUPRACONDYLAR FRACTURE OF LEFT HUMERUS WITH ROUTINE HEALING, SUBSEQUENT ENCOUNTER: ICD-10-CM

## 2024-07-29 DIAGNOSIS — U07.1 COVID-19: Primary | ICD-10-CM

## 2024-07-29 DIAGNOSIS — H91.93 BILATERAL HEARING LOSS, UNSPECIFIED HEARING LOSS TYPE: ICD-10-CM

## 2024-07-29 PROCEDURE — 99308 SBSQ NF CARE LOW MDM 20: CPT | Performed by: INTERNAL MEDICINE

## 2024-07-29 NOTE — PROGRESS NOTES
Nursing home follow up visit  Name: Marian Fritz  MRN: [id number]  YOB: 1926  Date of Service: 7/29/2024      Pt was evaluated during nursing home visit today  Patient is doing OK. Participating in therapy well  No sob, cp, PND, orthopnea  Eating ok, sleeping ok   Moving BM ok.   Tolerating medications well    Objective :  Vitals were noted  Patient is not any acute distress  Conjunctiva- Pink, no icterus   No cervical LN enlargement   Lungs clear, s1s2 +   No edema , No calf tenderness     Assessment:    1. COVID-19 -recovering well   2. Physical deconditioning    3. Closed supracondylar fracture of left humerus with routine healing, subsequent encounter -improved   4. Bilateral hearing loss, unspecified hearing loss type         Plan:  Patient is doing well.   Continue OT/PT Rehab.   Current medications are effective. advised to continue current medications.  Will continue to follow   Patient felt satisfied with plan

## 2024-07-29 NOTE — LETTER
Patient: Marian Fritz  : 1926    Encounter Date: 2024        Nursing home follow up visit  Name: Marian Fritz  MRN: [id number]  YOB: 1926  Date of Service: 2024      Pt was evaluated during nursing home visit today  Patient is doing OK. Participating in therapy well  No sob, cp, PND, orthopnea  Eating ok, sleeping ok   Moving BM ok.   Tolerating medications well    Objective :  Vitals were noted  Patient is not any acute distress  Conjunctiva- Pink, no icterus   No cervical LN enlargement   Lungs clear, s1s2 +   No edema , No calf tenderness     Assessment:    1. COVID-19 -recovering well   2. Physical deconditioning    3. Closed supracondylar fracture of left humerus with routine healing, subsequent encounter -improved   4. Bilateral hearing loss, unspecified hearing loss type         Plan:  Patient is doing well.   Continue OT/PT Rehab.   Current medications are effective. advised to continue current medications.  Will continue to follow   Patient felt satisfied with plan            Electronically Signed By: Pantera Cotto MD   24  5:15 PM

## 2024-07-29 NOTE — PROGRESS NOTES
Nursing Home  History & Physical Visit    Name: Marian Fritz  MRN: [id number]  YOB: 1926  Date of Service: 7/25/24      History of Present Illness  Pt known to me from her previous care over at this facility recently when she was admitted to L humerus fracture. No surgery was done. She went home and with in few days was back in ER with generalized weakness. And at [facility] she was found to be covid 19 positive . She was managed conservatively at [facility] and now she is her with [facility] rehab.   Daughter is present in room during exam .    Patient denies any shortness of breath, PND, orthopnea, chest pain , palpitation, syncope or edema in legs  patient denies any abdominal pain, tenderness, nausea, vomiting, change in bowel habits or blood in stool.    Review of Systems  REVIEW OF SYSTEMS:   All other systems have been reviewed and are negative in relation to patient's complaint and other than what is mentioned in History of present illness.     Vital Signs  [place]izen [place]   Temperature  97.5 °F  07/28/2024 16:53  Pulse  63 per minute  07/28/2024 16:53  Respirations  16 per minute  07/28/2024 16:53  Blood Pressure  140 / 73 mmHg  07/28/2024 16:53  O2 Saturation  97 %  07/28/2024 16:53  Weight  106 lbs / Admission BMI: 20.7  07/23/2024 16:39    Physical Exam  Vitals noted   Not in acute distress  Conj Pink, No icterus  Neck: No Cervical LN enlargement, No Thyroid enlargement   Lungs: good air entry bilaterally, no rales or rhonchi  CVS: S1 S2 + , no S3. No loud heart murmur heard.   Abdomen: Soft, non tender , BS +. no organomegaly , no CVA tenderness  CNS: Pt is alert very hard of hearing . , moving all 4 extremities. no motor weakness or abnormal movements noted on gross examination.  No spine tenderness  Extremities: No edema, No calf tenderness, Kamryn's sign negative.     Assessment/Plan:    1. COVID-19 -clinically it is getting better    2. Weakness    3. Physical deconditioning    4. Atrial fibrillation,  unspecified type (Multi) -hx of. Has pacemaker   5. Benign essential HTN -controlled     6. Hypomagnesemia -hx of.    7. Closed supracondylar fracture of left humerus with routine healing, subsequent encounter    8. Bilateral hearing loss, unspecified hearing loss type    9. Pacemaker         Plan:     Available medical records reviewed . Patient was examined and detailed History and physical done . All questions answered to patient's satisfaction . Total time for chart reviewing , detailed examination and coordinating care with patient was > 25 minutes.     Patient is doing well.   Continue OT/PT Rehab.   Current medications are effective. advised to continue current medications.  Will continue to follow   Patient felt satisfied with plan

## 2024-08-05 ENCOUNTER — NURSING HOME VISIT (OUTPATIENT)
Dept: POST ACUTE CARE | Facility: EXTERNAL LOCATION | Age: 89
End: 2024-08-05
Payer: MEDICARE

## 2024-08-05 DIAGNOSIS — Z95.0 PACEMAKER: ICD-10-CM

## 2024-08-05 DIAGNOSIS — S42.412D CLOSED SUPRACONDYLAR FRACTURE OF LEFT HUMERUS WITH ROUTINE HEALING, SUBSEQUENT ENCOUNTER: ICD-10-CM

## 2024-08-05 DIAGNOSIS — R53.81 PHYSICAL DECONDITIONING: ICD-10-CM

## 2024-08-05 DIAGNOSIS — U07.1 COVID-19: Primary | ICD-10-CM

## 2024-08-05 DIAGNOSIS — I48.91 ATRIAL FIBRILLATION, UNSPECIFIED TYPE (MULTI): ICD-10-CM

## 2024-08-05 DIAGNOSIS — H91.93 BILATERAL HEARING LOSS, UNSPECIFIED HEARING LOSS TYPE: ICD-10-CM

## 2024-08-05 PROCEDURE — 99308 SBSQ NF CARE LOW MDM 20: CPT | Performed by: INTERNAL MEDICINE

## 2024-08-05 NOTE — PROGRESS NOTES
Nursing home follow up visit  Name: Marian Fritz  MRN: [id number]  YOB: 1926  Date of Service: 8/5/2024      Pt was evaluated during nursing home visit today  Patient is doing OK. Participating in therapy well  No sob, cp, PND, orthopnea  Eating ok, sleeping ok   Moving BM ok.   Tolerating medications well    Objective :  Vitals were noted  Patient is not any acute distress  Conjunctiva- Pink, no icterus   No cervical LN enlargement   Lungs clear, s1s2 +   No edema , No calf tenderness     Assessment:  1. COVID-19 - she is off of isolation now. Doing ok    2. Closed supracondylar fracture of left humerus with routine healing, subseque  nt encounter -hx of. No active issue with it   3. Physical deconditioning -improving with therapy    4. Bilateral hearing loss, unspecified hearing loss type    5. Atrial fibrillation, unspecified type (Multi) -hx of. Rate controlled   6. Pacemaker -hx of.          Plan:  Patient is doing well.   Continue OT/PT Rehab.   Current medications are effective. advised to continue current medications.  Will continue to follow   Patient felt satisfied with plan

## 2024-08-05 NOTE — LETTER
Patient: Marian Fritz  : 1926    Encounter Date: 2024        Nursing home follow up visit  Name: Marian Fritz  MRN: [id number]  YOB: 1926  Date of Service: 2024      Pt was evaluated during nursing home visit today  Patient is doing OK. Participating in therapy well  No sob, cp, PND, orthopnea  Eating ok, sleeping ok   Moving BM ok.   Tolerating medications well    Objective :  Vitals were noted  Patient is not any acute distress  Conjunctiva- Pink, no icterus   No cervical LN enlargement   Lungs clear, s1s2 +   No edema , No calf tenderness     Assessment:  1. COVID-19 - she is off of isolation now. Doing ok    2. Closed supracondylar fracture of left humerus with routine healing, subseque  nt encounter -hx of. No active issue with it   3. Physical deconditioning -improving with therapy    4. Bilateral hearing loss, unspecified hearing loss type    5. Atrial fibrillation, unspecified type (Multi) -hx of. Rate controlled   6. Pacemaker -hx of.          Plan:  Patient is doing well.   Continue OT/PT Rehab.   Current medications are effective. advised to continue current medications.  Will continue to follow   Patient felt satisfied with plan            Electronically Signed By: Pantera Cotto MD   24 10:13 AM

## 2024-08-07 ENCOUNTER — PATIENT OUTREACH (OUTPATIENT)
Dept: CARE COORDINATION | Facility: CLINIC | Age: 89
End: 2024-08-07
Payer: MEDICARE

## 2024-08-07 ENCOUNTER — DOCUMENTATION (OUTPATIENT)
Dept: CARE COORDINATION | Facility: CLINIC | Age: 89
End: 2024-08-07
Payer: MEDICARE

## 2024-08-07 NOTE — PROGRESS NOTES
Discharge Facility:[facility]  Discharge Diagnosis:Acute resp Failure Covid   Admission Date:07/23/24  Discharge Date: 08/06/245    PCP Appointment Date:08/19/24  Specialist Appointment Date:   Hospital Encounter and Summary Linked: No  See discharge assessment below for further details  Engagement  Call Start Time: 0906 (8/7/2024  9:06 AM)    Medications  Medications reviewed with patient/caregiver?: Yes (8/7/2024  9:06 AM)  Is the patient having any side effects they believe may be caused by any medication additions or changes?: No (8/7/2024  9:06 AM)  Does the patient have all medications ordered at discharge?: Not applicable (8/7/2024  9:06 AM)  Is the patient taking all medications as directed (includes completed medication regime)?: Yes (8/7/2024  9:06 AM)    Appointments  Does the patient have a primary care provider?: Yes (8/7/2024  9:06 AM)  Care Management Interventions: Verified appointment date/time/provider (8/7/2024  9:06 AM)    Self Management  Has home health visited the patient within 72 hours of discharge?: No (will be calling today) (8/7/2024  9:06 AM)  Has all Durable Medical Equipment (DME) been delivered?: No (8/7/2024  9:06 AM)    Patient Teaching  Does the patient have access to their discharge instructions?: Yes (spoke to her daughter her caregiver) (8/7/2024  9:06 AM)  Care Management Interventions: Reviewed instructions with patient (8/7/2024  9:06 AM)  What is the patient's perception of their health status since discharge?: Improving (8/7/2024  9:06 AM)

## 2024-08-19 ENCOUNTER — APPOINTMENT (OUTPATIENT)
Dept: PRIMARY CARE | Facility: CLINIC | Age: 89
End: 2024-08-19
Payer: MEDICARE

## 2024-08-19 ENCOUNTER — PATIENT OUTREACH (OUTPATIENT)
Dept: CARE COORDINATION | Facility: CLINIC | Age: 89
End: 2024-08-19

## 2024-08-19 VITALS
DIASTOLIC BLOOD PRESSURE: 78 MMHG | WEIGHT: 108 LBS | HEART RATE: 83 BPM | OXYGEN SATURATION: 94 % | SYSTOLIC BLOOD PRESSURE: 120 MMHG | BODY MASS INDEX: 21.09 KG/M2 | RESPIRATION RATE: 20 BRPM

## 2024-08-19 DIAGNOSIS — R53.81 MALAISE AND FATIGUE: Primary | ICD-10-CM

## 2024-08-19 DIAGNOSIS — K21.9 GASTROESOPHAGEAL REFLUX DISEASE WITHOUT ESOPHAGITIS: ICD-10-CM

## 2024-08-19 DIAGNOSIS — I10 BENIGN ESSENTIAL HTN: ICD-10-CM

## 2024-08-19 DIAGNOSIS — R53.83 MALAISE AND FATIGUE: Primary | ICD-10-CM

## 2024-08-19 PROBLEM — J98.8: Status: ACTIVE | Noted: 2024-08-19

## 2024-08-19 PROBLEM — H26.9 CATARACT OF BOTH EYES: Status: ACTIVE | Noted: 2024-08-19

## 2024-08-19 PROBLEM — R53.1 ASTHENIA: Status: ACTIVE | Noted: 2024-08-19

## 2024-08-19 PROBLEM — M79.603 PAIN OF UPPER EXTREMITY: Status: ACTIVE | Noted: 2024-08-19

## 2024-08-19 PROBLEM — Z86.79 HISTORY OF HYPERTENSION: Status: ACTIVE | Noted: 2024-08-19

## 2024-08-19 PROBLEM — I49.9 CARDIAC ARRHYTHMIA: Status: ACTIVE | Noted: 2024-08-19

## 2024-08-19 PROCEDURE — 1159F MED LIST DOCD IN RCRD: CPT | Performed by: INTERNAL MEDICINE

## 2024-08-19 PROCEDURE — 3078F DIAST BP <80 MM HG: CPT | Performed by: INTERNAL MEDICINE

## 2024-08-19 PROCEDURE — 3074F SYST BP LT 130 MM HG: CPT | Performed by: INTERNAL MEDICINE

## 2024-08-19 PROCEDURE — 99214 OFFICE O/P EST MOD 30 MIN: CPT | Performed by: INTERNAL MEDICINE

## 2024-08-19 RX ORDER — CARVEDILOL 6.25 MG/1
TABLET ORAL
Qty: 180 TABLET | Refills: 3 | Status: SHIPPED | OUTPATIENT
Start: 2024-08-19

## 2024-08-19 RX ORDER — PANTOPRAZOLE SODIUM 20 MG/1
20 TABLET, DELAYED RELEASE ORAL DAILY
Qty: 90 TABLET | Refills: 3 | Status: SHIPPED | OUTPATIENT
Start: 2024-08-19 | End: 2025-08-19

## 2024-08-19 ASSESSMENT — PATIENT HEALTH QUESTIONNAIRE - PHQ9
1. LITTLE INTEREST OR PLEASURE IN DOING THINGS: NOT AT ALL
SUM OF ALL RESPONSES TO PHQ9 QUESTIONS 1 AND 2: 0
2. FEELING DOWN, DEPRESSED OR HOPELESS: NOT AT ALL

## 2024-08-19 ASSESSMENT — ENCOUNTER SYMPTOMS
DEPRESSION: 0
LOSS OF SENSATION IN FEET: 0
OCCASIONAL FEELINGS OF UNSTEADINESS: 1

## 2024-08-19 NOTE — PROGRESS NOTES
Unable to reach patient for call back after patient's follow up appointment with PCP.   CAROLINAM with call back number for patient to call if needed   If no voicemail available call attempts x 2 were made to contact the patient to assist with any questions or concerns patient may have.

## 2024-08-19 NOTE — PROGRESS NOTES
Subjective   Patient ID: Marian Fritz is a 89 y.o. female who presents for Hospital Follow-up.    DOA-7/21/2024    DOD-7/23/2024    Covid-19    L humerus Fx    Afib    STML                HPI     Review of Systems      No Fever/chills/headaches/dizziness/chest pains/ cough/ shortness of breath/palpitations/ abdominal pain /Nausea/vomiting/diarrhea/ constipation/urine frequency/blood in urine.     Objective   Pulse 83   Resp 20   Wt 49 kg (108 lb)   SpO2 94%   BMI 21.09 kg/m²     Physical Exam    No JVP elevation. No palpable Lymph Nodes. No Thyromegaly    HEENT- Negative    CVS-NL S1/S2 . No MRG    Lungs-CTA. B/S= B/L    Abdomen-Soft, Non-tender. No masses or HSM    Extremities: No C/C/E    Skin-No abnormal moles/rash    A+O X 1        Assessment/Plan       DOA-7/21/2024    DOD-7/23/2024    Covid-19    L humerus Fx    Afib    CVA    STML    Plan:    Labs    Home PT/OT    ? Home Health Aid     Continue with current Rx    Follow up 3 months/PRN

## 2024-08-22 ENCOUNTER — TELEPHONE (OUTPATIENT)
Dept: PRIMARY CARE | Facility: CLINIC | Age: 89
End: 2024-08-22

## 2024-08-22 NOTE — TELEPHONE ENCOUNTER
Patient daughter ( tami ) wanna know if she can get  a script for patient a wheel chair please give patient daughter a call back [phone]

## 2024-08-28 NOTE — TELEPHONE ENCOUNTER
Pao valero called on behalf of the pt to get a plan of care for the patient. She states the paperwork was faxed but no reply. PT needs a signed plan of care by next week        [phone]

## 2024-08-29 ENCOUNTER — APPOINTMENT (OUTPATIENT)
Dept: RADIOLOGY | Facility: HOSPITAL | Age: 89
End: 2024-08-29
Payer: MEDICARE

## 2024-08-29 ENCOUNTER — OFFICE VISIT (OUTPATIENT)
Dept: ORTHOPEDIC SURGERY | Facility: HOSPITAL | Age: 89
End: 2024-08-29
Payer: MEDICARE

## 2024-08-29 ENCOUNTER — HOSPITAL ENCOUNTER (OUTPATIENT)
Dept: RADIOLOGY | Facility: HOSPITAL | Age: 89
Discharge: HOME | End: 2024-08-29
Payer: MEDICARE

## 2024-08-29 VITALS — HEIGHT: 58 IN | BODY MASS INDEX: 22.67 KG/M2 | WEIGHT: 108 LBS

## 2024-08-29 DIAGNOSIS — M17.0 OSTEOARTHRITIS OF BOTH KNEES, UNSPECIFIED OSTEOARTHRITIS TYPE: Primary | ICD-10-CM

## 2024-08-29 DIAGNOSIS — G89.29 CHRONIC PAIN OF BOTH KNEES: ICD-10-CM

## 2024-08-29 DIAGNOSIS — M25.561 CHRONIC PAIN OF BOTH KNEES: ICD-10-CM

## 2024-08-29 DIAGNOSIS — M25.562 CHRONIC PAIN OF BOTH KNEES: ICD-10-CM

## 2024-08-29 PROCEDURE — 99213 OFFICE O/P EST LOW 20 MIN: CPT | Performed by: SPECIALIST/TECHNOLOGIST

## 2024-08-29 PROCEDURE — 2500000004 HC RX 250 GENERAL PHARMACY W/ HCPCS (ALT 636 FOR OP/ED): Performed by: SPECIALIST/TECHNOLOGIST

## 2024-08-29 PROCEDURE — 1160F RVW MEDS BY RX/DR IN RCRD: CPT | Performed by: SPECIALIST/TECHNOLOGIST

## 2024-08-29 PROCEDURE — 2500000005 HC RX 250 GENERAL PHARMACY W/O HCPCS: Performed by: SPECIALIST/TECHNOLOGIST

## 2024-08-29 PROCEDURE — 1125F AMNT PAIN NOTED PAIN PRSNT: CPT | Performed by: SPECIALIST/TECHNOLOGIST

## 2024-08-29 PROCEDURE — 73562 X-RAY EXAM OF KNEE 3: CPT | Mod: RT

## 2024-08-29 PROCEDURE — 20610 DRAIN/INJ JOINT/BURSA W/O US: CPT | Mod: 50 | Performed by: SPECIALIST/TECHNOLOGIST

## 2024-08-29 PROCEDURE — 1159F MED LIST DOCD IN RCRD: CPT | Performed by: SPECIALIST/TECHNOLOGIST

## 2024-08-29 PROCEDURE — 1036F TOBACCO NON-USER: CPT | Performed by: SPECIALIST/TECHNOLOGIST

## 2024-08-29 PROCEDURE — 73560 X-RAY EXAM OF KNEE 1 OR 2: CPT | Mod: LT

## 2024-08-29 RX ORDER — TRIAMCINOLONE ACETONIDE 40 MG/ML
10 INJECTION, SUSPENSION INTRA-ARTICULAR; INTRAMUSCULAR
Status: COMPLETED | OUTPATIENT
Start: 2024-08-29 | End: 2024-08-29

## 2024-08-29 RX ORDER — LIDOCAINE HYDROCHLORIDE 10 MG/ML
4 INJECTION INFILTRATION; PERINEURAL
Status: COMPLETED | OUTPATIENT
Start: 2024-08-29 | End: 2024-08-29

## 2024-08-29 ASSESSMENT — PAIN SCALES - GENERAL: PAINLEVEL_OUTOF10: 9

## 2024-08-29 ASSESSMENT — PAIN - FUNCTIONAL ASSESSMENT: PAIN_FUNCTIONAL_ASSESSMENT: 0-10

## 2024-08-29 NOTE — PROGRESS NOTES
Chief Complaint: Pain of the Left Knee and Pain of the Right Knee       HPI:  Marian Fritz is a 89 y.o. female presenting today with her daughter for bilateral knee pain.  She is an established patient of Estela Pelaez PA-C one of my colleagues who is currently away on leave.  Today, she reports a generalized aching, throbbing sensation on the bilateral knees, right greater than left that worsens with walking.  She presents today in a wheelchair.  She has a walker at home for ambulation.  Her daughter reports a fall in June through which they are unsure how it happened as they found her laying on the floor next to her bed.  She had a stress fracture of her humerus.  She denies numbness or tingling into the bilateral lower extremities.  Presents for continued treatment recommendations.    Objective     ROS:  Constitutional: No fever, no chills, not feeling tired, no recent weight gain and no recent weight loss  ENT: No nosebleeds  Cardiovascular: No chest pain  Respiratory: No shortness of breath and no cough  Gastrointestinal: No abdominal pain, no nausea, no diarrhea, and no vomiting  Musculoskeletal: Positive for bilateral knee pain right greater than left  Integumentary: No rashes and no skin lesions  Neurological: No headache  Psychiatric: No sleep disturbances no depression  Endocrine: No muscle weakness and no muscle cramps  Hematologic/lymphatic: No swelling glands and no tendency for easy bruising    Past Medical History:   Diagnosis Date    Personal history of other diseases of the circulatory system     History of hypertension    Personal history of other diseases of the nervous system and sense organs 12/16/2013    History of complex partial epilepsy    Pure hypercholesterolemia, unspecified     High cholesterol    Unspecified cataract     Cataracts, both eyes    Unspecified convulsions (Multi)     Seizures        Past Surgical History:   Procedure Laterality Date    CATARACT EXTRACTION  12/01/2014     Cataract Surgery    CT ANGIO NECK  9/16/2018    CT NECK ANGIO W AND WO IV CONTRAST 9/16/2018 [facility] EMERGENCY LEGACY    CT HEAD ANGIO W AND WO IV CONTRAST  9/16/2018    CT HEAD ANGIO W AND WO IV CONTRAST 9/16/2018 [facility] EMERGENCY LEGACY    MR HEAD ANGIO WO IV CONTRAST  11/28/2018    MR HEAD ANGIO WO IV CONTRAST 11/28/2018 [facility]GAC[facility]    MR NECK ANGIO WO IV CONTRAST  11/28/2018    MR NECK ANGIO WO IV CONTRAST 11/28/2018 [facility]    OTHER SURGICAL HISTORY  12/01/2014    Simple Mastectomy Right Breast        Social Connections: Not on file          Physical Exam:  General appearance: WN, WD female, in no acute distress  Skin: No rashes, lesions or wounds  Head: Normocephalic, no evidence of trauma  Eye: EOMI, conjunctiva clear, no discharge  ENT: Nares patent, hearing is diminished  Neck: No abnormal contour, tracheal midline  Chest/lungs: No respiratory distress, speaking in complete sentences  Musculoskeletal: RIGHT KNEE: Tender to palpation over the medial and lateral joint line.  Stable valgus and varus stress test.  She has genu varum of the right knee.  There is a pocket of swelling on the medial aspect of the right knee.  LEFT KNEE: Tender to palpation over the lateral joint line.  Stable valgus and varus stress test.  There is a pocket of swelling on the medial aspect of her left knee.  Trace effusion bilaterally.  5/5 manual muscle testing knee flexion and extension bilaterally.  No decreased ROM, muscle wasting, rigidity    Neurological: A&O x3, no focal deficits, intact bilateral LE  Psych: normal affect, mood, appearance      Image Results:  X-rays taken on 8/29/2024 were reviewed with the patient and her daughter in the office today and showed no acute fractures or dislocations.  There is severe tricompartmental osteoarthritis.  There is some evidence of calcification of her veins on the posterior aspect of her knee.      Assessment/Plan   Encounter Diagnoses:  Chronic pain of both  knees    Orders Placed This Encounter    XR knee right 3 views    XR knee left 1-2 views       The patient, her daughter and I discussed her clinical presentation and physical exam findings consistent with bilateral knee osteoarthritis.  We discussed her conservative and surgical treatment options.  The only surgical option that would provide her any benefit of relief would be a total knee replacement.  They voiced she is not willing to pursue this intervention due to her age and medical history.  Therefore, we agreed upon continued conservative management at this time.  We agreed upon bilateral corticosteroid injections into each knee.  She tolerated this procedure well.  She may ice her knee 15-20 minutes at a time 2-3 times per day.  Discussed low impact activities to try to keep her lower extremity strong.  I am happy to see her back on an as-needed basis.  They are in agreement this plan.  Their questions have been answered.    L Inj/Asp: bilateral knee on 8/29/2024 12:33 PM  Details: 25 G needle, anterolateral approach  Medications (Right): 4 mL lidocaine 10 mg/mL (1 %); 10 mg triamcinolone acetonide 40 mg/mL  Medications (Left): 4 mL lidocaine 10 mg/mL (1 %); 10 mg triamcinolone acetonide 40 mg/mL  Outcome: tolerated well, no immediate complications  Procedure, treatment alternatives, risks and benefits explained, specific risks discussed. Consent was given by the patient. Immediately prior to procedure a time out was called to verify the correct patient, procedure, equipment, support staff and site/side marked as required. Patient was prepped and draped in the usual sterile fashion.             ** This office note was dictated using Dragon voice to text software and was not proofread for spelling or grammatical errors **

## 2024-09-03 ENCOUNTER — PATIENT OUTREACH (OUTPATIENT)
Dept: PRIMARY CARE | Facility: CLINIC | Age: 89
End: 2024-09-03
Payer: MEDICARE

## 2024-09-03 NOTE — PROGRESS NOTES
Successful outreach to patient regarding hospitalization as patient continues TCM program.   At time of outreach call the patient feels as if their condition has improved  some  since initial visit with PCP or specialist.  Questions or concerns addressed at this time with patient.   Provided contact information to patient if any further non-emergent needs arise.

## 2024-09-04 ENCOUNTER — APPOINTMENT (OUTPATIENT)
Dept: ORTHOPEDIC SURGERY | Facility: HOSPITAL | Age: 89
End: 2024-09-04
Payer: MEDICARE

## 2024-09-06 ENCOUNTER — TELEPHONE (OUTPATIENT)
Facility: CLINIC | Age: 89
End: 2024-09-06
Payer: MEDICARE

## 2024-10-07 DIAGNOSIS — I10 BENIGN ESSENTIAL HTN: ICD-10-CM

## 2024-10-07 RX ORDER — POTASSIUM CHLORIDE 750 MG/1
10 TABLET, FILM COATED, EXTENDED RELEASE ORAL EVERY OTHER DAY
Qty: 45 TABLET | Refills: 3 | Status: SHIPPED | OUTPATIENT
Start: 2024-10-07 | End: 2025-10-07

## 2024-10-31 ENCOUNTER — PATIENT OUTREACH (OUTPATIENT)
Dept: PRIMARY CARE | Facility: CLINIC | Age: 89
End: 2024-10-31
Payer: COMMERCIAL

## 2024-11-16 DIAGNOSIS — I10 BENIGN ESSENTIAL HTN: ICD-10-CM

## 2024-11-16 DIAGNOSIS — R60.9 EDEMA, UNSPECIFIED TYPE: Primary | ICD-10-CM

## 2024-11-16 DIAGNOSIS — I48.91 ATRIAL FIBRILLATION, UNSPECIFIED TYPE (MULTI): ICD-10-CM

## 2024-11-16 RX ORDER — AMLODIPINE BESYLATE 5 MG/1
5 TABLET ORAL DAILY
Qty: 90 TABLET | Refills: 3 | Status: SHIPPED | OUTPATIENT
Start: 2024-11-16 | End: 2025-11-16

## 2024-11-16 RX ORDER — LOSARTAN POTASSIUM 100 MG/1
100 TABLET ORAL DAILY
Qty: 90 TABLET | Refills: 3 | Status: SHIPPED | OUTPATIENT
Start: 2024-11-16 | End: 2024-11-16 | Stop reason: SDUPTHER

## 2024-11-16 RX ORDER — LOSARTAN POTASSIUM 100 MG/1
100 TABLET ORAL DAILY
Qty: 90 TABLET | Refills: 3 | Status: SHIPPED | OUTPATIENT
Start: 2024-11-16 | End: 2025-11-16

## 2024-11-16 RX ORDER — FUROSEMIDE 20 MG/1
20 TABLET ORAL EVERY OTHER DAY
Qty: 45 TABLET | Refills: 3 | Status: SHIPPED | OUTPATIENT
Start: 2024-11-16 | End: 2025-11-16

## 2024-11-18 RX ORDER — FUROSEMIDE 20 MG/1
TABLET ORAL
Qty: 8 TABLET | Refills: 0 | OUTPATIENT
Start: 2024-11-18

## 2024-11-18 RX ORDER — AMLODIPINE BESYLATE 5 MG/1
10 TABLET ORAL NIGHTLY
Qty: 60 TABLET | Refills: 0 | OUTPATIENT
Start: 2024-11-18

## 2024-11-18 RX ORDER — LOSARTAN POTASSIUM 100 MG/1
100 TABLET ORAL DAILY
Qty: 30 TABLET | Refills: 0 | OUTPATIENT
Start: 2024-11-18

## 2024-12-02 ENCOUNTER — APPOINTMENT (OUTPATIENT)
Dept: ORTHOPEDIC SURGERY | Facility: HOSPITAL | Age: 89
End: 2024-12-02
Payer: COMMERCIAL

## 2025-01-15 ENCOUNTER — OFFICE VISIT (OUTPATIENT)
Dept: ORTHOPEDIC SURGERY | Facility: HOSPITAL | Age: OVER 89
End: 2025-01-15
Payer: MEDICARE

## 2025-01-15 DIAGNOSIS — M17.0 OSTEOARTHRITIS OF BOTH KNEES, UNSPECIFIED OSTEOARTHRITIS TYPE: Primary | ICD-10-CM

## 2025-01-15 PROCEDURE — 99212 OFFICE O/P EST SF 10 MIN: CPT | Mod: 25 | Performed by: PHYSICIAN ASSISTANT

## 2025-01-15 PROCEDURE — 2500000004 HC RX 250 GENERAL PHARMACY W/ HCPCS (ALT 636 FOR OP/ED): Performed by: PHYSICIAN ASSISTANT

## 2025-01-15 PROCEDURE — 1159F MED LIST DOCD IN RCRD: CPT | Performed by: PHYSICIAN ASSISTANT

## 2025-01-15 PROCEDURE — 20610 DRAIN/INJ JOINT/BURSA W/O US: CPT | Mod: 50 | Performed by: PHYSICIAN ASSISTANT

## 2025-01-15 PROCEDURE — 99212 OFFICE O/P EST SF 10 MIN: CPT | Performed by: PHYSICIAN ASSISTANT

## 2025-01-15 RX ORDER — LIDOCAINE HYDROCHLORIDE 10 MG/ML
4 INJECTION, SOLUTION INFILTRATION; PERINEURAL
Status: COMPLETED | OUTPATIENT
Start: 2025-01-15 | End: 2025-01-15

## 2025-01-15 RX ORDER — TRIAMCINOLONE ACETONIDE 40 MG/ML
40 INJECTION, SUSPENSION INTRA-ARTICULAR; INTRAMUSCULAR
Status: COMPLETED | OUTPATIENT
Start: 2025-01-15 | End: 2025-01-15

## 2025-01-15 RX ADMIN — TRIAMCINOLONE ACETONIDE 40 MG: 400 INJECTION, SUSPENSION INTRA-ARTICULAR; INTRAMUSCULAR at 15:05

## 2025-01-15 RX ADMIN — LIDOCAINE HYDROCHLORIDE 4 ML: 10 INJECTION, SOLUTION INFILTRATION; PERINEURAL at 15:05

## 2025-01-15 ASSESSMENT — PAIN DESCRIPTION - DESCRIPTORS: DESCRIPTORS: ACHING;THROBBING

## 2025-01-15 ASSESSMENT — PAIN - FUNCTIONAL ASSESSMENT: PAIN_FUNCTIONAL_ASSESSMENT: 0-10

## 2025-01-15 ASSESSMENT — PAIN SCALES - GENERAL: PAINLEVEL_OUTOF10: 5 - MODERATE PAIN

## 2025-01-15 NOTE — PROGRESS NOTES
Marian returns to clinic today with her daughter for recurrent bilateral knee pain. She has a history of severe bilateral knee osteoarthritis.  Issue with memory.  It is mostly wheelchair-bound however does use a walker while at homePast medical history, medications, allergies, surgical history and review of systems have been reviewed with the patient. Pertinent changes are documented in the HPI. Otherwise they are unchanged when compared to last visit     Physical Examination Findings:  Constitutional: Appears well-developed and well-nourished.  Head: Normocephalic and atraumatic.  Eyes: Pupils are equal and round.  Cardiovascular: Intact distal pulses.   Respiratory: Effort normal. No respiratory distress.  Neurologic: Alert and oriented to person, place, and time.  Skin: Skin is warm and dry.  Hematologic / Lymphatic: No lymphedema, lymphangitis.  Psychiatric: normal mood and affect. Behavior is normal.   Musculoskeletal: Bilateral knees with small effusion. Tenderness over medial joint line. Ambulation with a walker.  Bilateral valgus alignment     Impression: Bilateral knee osteoarthritis     Plan: Repeat steroid injections were performed today and tolerated well. She will return to our office for recurrence or progression of symptoms.     Patient ID: Marian Fritz is a 89 y.o. female.    L Inj/Asp: bilateral knee on 1/15/2025 3:05 PM  Indications: pain  Details: 22 G needle, lateral approach  Medications (Right): 4 mL lidocaine 10 mg/mL (1 %); 40 mg triamcinolone acetonide 40 mg/mL  Medications (Left): 4 mL lidocaine 10 mg/mL (1 %); 40 mg triamcinolone acetonide 40 mg/mL  Procedure, treatment alternatives, risks and benefits explained, specific risks discussed. Consent was given by the patient. Immediately prior to procedure a time out was called to verify the correct patient, procedure, equipment, support staff and site/side marked as required.         Estela Pelaez PA-C  Department of Orthopaedic  [de-identified] : 73 y/o F s/p left TKA 10/18/2019. We reassured the pt that her hardware is in good positioning with no evidence of wear, loosening, or subsidence. She should continue to do low impact exercises. She is happy with her surgical outcome. Pt understands the importance of prophylaxis for invasive dental procedures. F/u annually for radiographic surveillance of the left knee. The pt has known osteoarthritis in her right knee. She is a candidate for a right TKA. At this moment, she would like to continue with conservative treatment. She completed Euflexxa injections in August 2020. Pt opted to receive a cortisone injection in the right knee today and tolerated it well. F/u with us in three months for repeat cortisone injections if needed. \par \par \par The patient is a 72 year old individual with end stage arthritis of their right knee joint. Based upon the patient's continued symptoms and failure to respond to conservative treatment I have recommended a right total knee arthroplasty for this patient. A long discussion took place with the patient describing what a total joint replacement is and what the procedure would entail. A total knee arthroplasty model, similar to the implant that will be used during the operation, was utilized to demonstrate and to discuss the various bearing surfaces of the implants. The hospitalization and post-operative care and rehabilitation were also discussed. The use of perioperative antibiotics and DVT prophylaxis were discussed. The risk, benefits and alternatives to a surgical intervention were discussed at length with the patient.  The patient was also advised of risks related to the medical comorbidities, elevated body mass index (BMI),  and smoking where applicable.  We discussed how to reduce modifiable risk factors and encouraged smoking cessation were applicable. A lengthy discussion took place to review the most common complications including but not limited to: deep vein thrombosis, pulmonary embolus, heart attack, stroke, infection, wound breakdown, numbness, damage to nerves, tendon, muscles, arteries or other blood vessels, death and other possible complications from anesthesia. The patient was told that we will take steps to minimize these risks by using sterile technique, antibiotics and DVT prophylaxis when appropriate and follow the patient postoperatively in the office setting to monitor progress. The possibility of recurrent pain, no improvement in pain and actual worsening of pain were also discussed with the patient.\par The discharge plan of care focused on the patient going home following surgery. The patient was encouraged to make the necessary arrangements to have someone stay with them when they are discharged home. Following discharge, a home care nurse will visit the patient. The home care nurse will open your home care case and request home physical therapy services. Home physical therapy will commence following discharge provided it is appropriate and covered by the health insurance benefit plan. \par The benefits of surgery were discussed with the patient including the potential for improving his/her current clinical condition through operative intervention. Alternatives to surgical intervention including continued conservative management were also discussed in detail. All questions were answered to the satisfaction of the patient. The treatment plan of care, as well as a model of a total knee arthroplasty equivalent to the one that will be used for their total joint replacement, was shared with the patient. The patient agreed to the plan of care as well as the use of implants in their total joint replacement.\par \par The patient is a 72 year individual with end stage arthritis of their left knee joint. Based upon the patient's continued symptoms and failure to respond to conservative treatment, pt successfully completed left total knee arthroplasty. A long discussion took place with the patient describing what a total joint replacement is and what the procedure would entail. A total knee arthroplasty model, similar to the implant that was used during the operation, was utilized to demonstrate and to discuss the various bearing surfaces of the implants. The hospitalization and post-operative care and rehabilitation were also discussed. The use of perioperative antibiotics and DVT prophylaxis were discussed. The risk, benefits and alternatives to a surgical intervention were discussed at length with the patient. The patient was also advised of risks related to the medical comorbidities, elevated body mass index (BMI), and smoking where applicable. We discussed how to reduce modifiable risk factors and encouraged smoking cessation were applicable.. A lengthy discussion took place to review the most common complications including but not limited to: deep vein thrombosis, pulmonary embolus, heart attack, stroke, infection, wound breakdown, numbness, damage to nerves, tendon, muscles, arteries or other blood vessels, death and other possible complications from anesthesia. The patient was told that we will take steps to minimize these risks by using sterile technique, antibiotics and DVT prophylaxis when appropriate and follow the patient postoperatively in the office setting to monitor progress. The possibility of recurrent pain, no improvement in pain and actual worsening of pain were also discussed with the patient.\par The discharge plan of care focused on the patient going home following surgery. The patient was encouraged to make the necessary arrangements to have someone stay with them when they are discharged home. Following discharge, a home care nurse was to the patient. The home care nurse would open the patient’s home care case and request home physical therapy services. Home physical therapy was to commence following discharge provided it was appropriate and covered by the health insurance benefit plan. \par The benefits of surgery were discussed with the patient including the potential for improving her current clinical condition through operative intervention. Alternatives to surgical intervention including continued conservative management were also discussed in detail. All questions were answered to the satisfaction of the patient. The treatment plan of care, as well as a model of a total knee arthroplasty equivalent to the one that will be used for their total joint replacement, was shared with the patient. The patient agreed to the plan of care as well as the use of implants in their total joint replacement.  Surgery  [facility]     Dictation performed with the use of voice recognition software. Syntax and grammatical errors may exist

## 2025-02-14 DIAGNOSIS — I10 BENIGN ESSENTIAL HTN: ICD-10-CM

## 2025-02-14 DIAGNOSIS — Z92.29 HX OF LONG TERM USE OF BLOOD THINNERS: ICD-10-CM

## 2025-02-14 DIAGNOSIS — R56.9 SEIZURE (MULTI): ICD-10-CM

## 2025-02-14 RX ORDER — AMLODIPINE BESYLATE 5 MG/1
5 TABLET ORAL DAILY
Qty: 90 TABLET | Refills: 3 | Status: SHIPPED | OUTPATIENT
Start: 2025-02-14 | End: 2026-02-14

## 2025-02-14 RX ORDER — LEVETIRACETAM 750 MG/1
750 TABLET ORAL 2 TIMES DAILY
Qty: 180 TABLET | Refills: 3 | Status: SHIPPED | OUTPATIENT
Start: 2025-02-14 | End: 2026-02-14

## 2025-02-17 DIAGNOSIS — E78.00 ELEVATED CHOLESTEROL: ICD-10-CM

## 2025-02-17 RX ORDER — ATORVASTATIN CALCIUM 40 MG/1
40 TABLET, FILM COATED ORAL DAILY
Qty: 90 TABLET | Refills: 3 | Status: SHIPPED | OUTPATIENT
Start: 2025-02-17 | End: 2026-02-17

## 2025-03-24 ENCOUNTER — HOSPITAL ENCOUNTER (OUTPATIENT)
Dept: CARDIOLOGY | Facility: CLINIC | Age: OVER 89
Discharge: HOME | End: 2025-03-24
Payer: MEDICARE

## 2025-03-24 DIAGNOSIS — I44.2 ATRIOVENTRICULAR BLOCK, COMPLETE (MULTI): ICD-10-CM

## 2025-03-24 DIAGNOSIS — Z95.0 PRESENCE OF CARDIAC PACEMAKER: ICD-10-CM

## 2025-03-24 PROCEDURE — 93296 REM INTERROG EVL PM/IDS: CPT

## 2025-04-23 ENCOUNTER — TELEPHONE (OUTPATIENT)
Facility: CLINIC | Age: OVER 89
End: 2025-04-23
Payer: MEDICARE

## 2025-04-23 NOTE — TELEPHONE ENCOUNTER
Please call Susie -  Wants to discuss a couple things about her mom    T/c with Susie regarding her mom- she is home- bound-CVA/ Cognitive decline, Pacemaker and OA- Continue with Tylenol PRN. ? Geriatric house visit. Recent pacemaker check.

## 2025-04-28 ENCOUNTER — APPOINTMENT (OUTPATIENT)
Dept: CARDIOLOGY | Facility: CLINIC | Age: OVER 89
End: 2025-04-28
Payer: MEDICARE

## 2025-04-28 ENCOUNTER — HOSPITAL ENCOUNTER (OUTPATIENT)
Dept: CARDIOLOGY | Facility: CLINIC | Age: OVER 89
Discharge: HOME | End: 2025-04-28
Payer: MEDICARE

## 2025-04-28 DIAGNOSIS — Z95.0 PRESENCE OF CARDIAC PACEMAKER: ICD-10-CM

## 2025-04-28 DIAGNOSIS — I44.2 ATRIOVENTRICULAR BLOCK, COMPLETE (MULTI): ICD-10-CM

## 2025-05-12 ENCOUNTER — HOSPITAL ENCOUNTER (OUTPATIENT)
Dept: CARDIOLOGY | Facility: CLINIC | Age: OVER 89
Discharge: HOME | End: 2025-05-12
Payer: MEDICARE

## 2025-05-12 DIAGNOSIS — Z95.0 PRESENCE OF CARDIAC PACEMAKER: ICD-10-CM

## 2025-05-12 DIAGNOSIS — I44.2 ATRIOVENTRICULAR BLOCK, COMPLETE (MULTI): ICD-10-CM

## 2025-07-17 ENCOUNTER — HOSPITAL ENCOUNTER (OUTPATIENT)
Dept: CARDIOLOGY | Facility: CLINIC | Age: OVER 89
Discharge: HOME | End: 2025-07-17
Payer: MEDICARE

## 2025-07-17 DIAGNOSIS — I44.2 ATRIOVENTRICULAR BLOCK, COMPLETE (MULTI): ICD-10-CM

## 2025-07-17 DIAGNOSIS — Z95.0 PRESENCE OF CARDIAC PACEMAKER: ICD-10-CM

## 2025-07-17 PROCEDURE — 93296 REM INTERROG EVL PM/IDS: CPT

## 2025-07-28 ENCOUNTER — TELEPHONE (OUTPATIENT)
Facility: CLINIC | Age: OVER 89
End: 2025-07-28
Payer: MEDICARE

## 2025-07-28 DIAGNOSIS — M19.90 OSTEOARTHRITIS, UNSPECIFIED OSTEOARTHRITIS TYPE, UNSPECIFIED SITE: ICD-10-CM

## 2025-07-28 DIAGNOSIS — M79.89 SWELLING OF EXTREMITY: ICD-10-CM

## 2025-07-28 DIAGNOSIS — M85.80 OSTEOPENIA, UNSPECIFIED LOCATION: ICD-10-CM

## 2025-07-28 DIAGNOSIS — M25.551 RIGHT HIP PAIN: ICD-10-CM

## 2025-07-28 DIAGNOSIS — M16.11 OSTEOARTHRITIS OF RIGHT HIP, UNSPECIFIED OSTEOARTHRITIS TYPE: ICD-10-CM

## 2025-07-28 DIAGNOSIS — M81.0 OSTEOPOROSIS, UNSPECIFIED OSTEOPOROSIS TYPE, UNSPECIFIED PATHOLOGICAL FRACTURE PRESENCE: ICD-10-CM

## 2025-08-08 DIAGNOSIS — K21.9 GASTROESOPHAGEAL REFLUX DISEASE WITHOUT ESOPHAGITIS: ICD-10-CM

## 2025-08-08 RX ORDER — PANTOPRAZOLE SODIUM 20 MG/1
20 TABLET, DELAYED RELEASE ORAL DAILY
Qty: 90 TABLET | Refills: 3 | Status: SHIPPED | OUTPATIENT
Start: 2025-08-08 | End: 2026-08-08

## 2025-08-11 DIAGNOSIS — I10 BENIGN ESSENTIAL HTN: ICD-10-CM

## 2025-08-11 RX ORDER — POTASSIUM CHLORIDE 750 MG/1
10 TABLET, FILM COATED, EXTENDED RELEASE ORAL EVERY OTHER DAY
Qty: 45 TABLET | Refills: 3 | Status: SHIPPED | OUTPATIENT
Start: 2025-08-11 | End: 2026-08-11

## 2025-08-11 RX ORDER — CARVEDILOL 6.25 MG/1
TABLET ORAL
Qty: 180 TABLET | Refills: 3 | Status: SHIPPED | OUTPATIENT
Start: 2025-08-11

## 2025-08-25 ENCOUNTER — OFFICE VISIT (OUTPATIENT)
Dept: GERIATRIC MEDICINE | Facility: CLINIC | Age: OVER 89
End: 2025-08-25
Payer: MEDICARE

## 2025-08-25 DIAGNOSIS — M17.0 PRIMARY OSTEOARTHRITIS OF BOTH KNEES: ICD-10-CM

## 2025-08-25 DIAGNOSIS — Z86.718 HISTORY OF DEEP VEIN THROMBOSIS (DVT) OF LOWER EXTREMITY: ICD-10-CM

## 2025-08-25 DIAGNOSIS — Z71.89 DNR (DO NOT RESUSCITATE) DISCUSSION: ICD-10-CM

## 2025-08-25 DIAGNOSIS — K21.9 GASTROESOPHAGEAL REFLUX DISEASE WITHOUT ESOPHAGITIS: ICD-10-CM

## 2025-08-25 DIAGNOSIS — M81.0 AGE-RELATED OSTEOPOROSIS WITHOUT CURRENT PATHOLOGICAL FRACTURE: ICD-10-CM

## 2025-08-25 DIAGNOSIS — H90.3 SENSORINEURAL HEARING LOSS (SNHL) OF BOTH EARS: ICD-10-CM

## 2025-08-25 DIAGNOSIS — I95.9 HYPOTENSION, UNSPECIFIED HYPOTENSION TYPE: Primary | ICD-10-CM

## 2025-08-25 DIAGNOSIS — I10 BENIGN ESSENTIAL HTN: ICD-10-CM

## 2025-08-25 DIAGNOSIS — Z95.0 PACEMAKER: ICD-10-CM

## 2025-08-25 DIAGNOSIS — F01.A0 MILD VASCULAR DEMENTIA WITHOUT BEHAVIORAL DISTURBANCE, PSYCHOTIC DISTURBANCE, MOOD DISTURBANCE, OR ANXIETY: ICD-10-CM

## 2025-08-25 DIAGNOSIS — I67.9 CEREBROVASCULAR DISEASE: ICD-10-CM

## 2025-08-25 PROBLEM — M79.89 SWELLING OF EXTREMITY: Status: RESOLVED | Noted: 2023-07-18 | Resolved: 2025-08-25

## 2025-08-25 PROBLEM — M79.652 PAIN OF LEFT LATERAL UPPER THIGH: Status: RESOLVED | Noted: 2023-07-18 | Resolved: 2025-08-25
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
## 2025-08-25 PROBLEM — S72.142D: Status: RESOLVED | Noted: 2023-07-18 | Resolved: 2025-08-25

## 2025-08-25 PROBLEM — U07.1 COVID-19: Status: RESOLVED | Noted: 2024-07-21 | Resolved: 2025-08-25

## 2025-08-25 PROBLEM — M25.551 RIGHT HIP PAIN: Status: RESOLVED | Noted: 2023-07-18 | Resolved: 2025-08-25
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
## 2025-08-25 PROBLEM — H61.92 LESION OF SKIN OF LEFT EAR: Status: RESOLVED | Noted: 2023-07-18 | Resolved: 2025-08-25

## 2025-08-25 PROBLEM — M19.90 UNSPECIFIED OSTEOARTHRITIS, UNSPECIFIED SITE: Status: RESOLVED | Noted: 2023-07-18 | Resolved: 2025-08-25

## 2025-08-25 PROBLEM — S42.212A FX HUMERAL NECK, LEFT, CLOSED, INITIAL ENCOUNTER: Status: RESOLVED | Noted: 2024-06-13 | Resolved: 2025-08-25

## 2025-08-25 PROBLEM — M70.62 GREATER TROCHANTERIC BURSITIS, LEFT: Status: RESOLVED | Noted: 2023-07-18 | Resolved: 2025-08-25

## 2025-08-25 PROBLEM — R79.89 ELEVATED TSH: Status: RESOLVED | Noted: 2023-07-18 | Resolved: 2025-08-25

## 2025-08-25 PROBLEM — M79.603 PAIN OF UPPER EXTREMITY: Status: RESOLVED | Noted: 2024-08-19 | Resolved: 2025-08-25

## 2025-08-25 PROBLEM — I82.409 DVT (DEEP VENOUS THROMBOSIS) (MULTI): Status: RESOLVED | Noted: 2023-07-18 | Resolved: 2025-08-25

## 2025-08-25 PROBLEM — R63.4 WEIGHT LOSS: Status: RESOLVED | Noted: 2023-07-18 | Resolved: 2025-08-25

## 2025-08-25 PROCEDURE — 3078F DIAST BP <80 MM HG: CPT | Performed by: CLINICAL NURSE SPECIALIST

## 2025-08-25 PROCEDURE — 3074F SYST BP LT 130 MM HG: CPT | Performed by: CLINICAL NURSE SPECIALIST

## 2025-08-25 PROCEDURE — 99345 HOME/RES VST NEW HIGH MDM 75: CPT | Performed by: CLINICAL NURSE SPECIALIST

## 2025-08-25 PROCEDURE — 1159F MED LIST DOCD IN RCRD: CPT | Performed by: CLINICAL NURSE SPECIALIST

## 2025-08-26 VITALS
HEART RATE: 68 BPM | RESPIRATION RATE: 16 BRPM | TEMPERATURE: 97.6 F | DIASTOLIC BLOOD PRESSURE: 40 MMHG | SYSTOLIC BLOOD PRESSURE: 88 MMHG

## 2025-09-02 ENCOUNTER — HOSPITAL ENCOUNTER (OUTPATIENT)
Dept: CARDIOLOGY | Facility: CLINIC | Age: OVER 89
Discharge: HOME | End: 2025-09-02
Payer: MEDICARE

## 2025-09-02 DIAGNOSIS — Z95.0 PRESENCE OF CARDIAC PACEMAKER: ICD-10-CM

## 2025-09-02 DIAGNOSIS — I44.2 ATRIOVENTRICULAR BLOCK, COMPLETE (MULTI): ICD-10-CM

## 2025-09-03 ENCOUNTER — OFFICE VISIT (OUTPATIENT)
Dept: GERIATRIC MEDICINE | Facility: CLINIC | Age: OVER 89
End: 2025-09-03
Payer: MEDICARE

## 2025-09-03 VITALS — RESPIRATION RATE: 16 BRPM | DIASTOLIC BLOOD PRESSURE: 76 MMHG | HEART RATE: 58 BPM | SYSTOLIC BLOOD PRESSURE: 100 MMHG

## 2025-09-03 DIAGNOSIS — I10 BENIGN ESSENTIAL HTN: ICD-10-CM

## 2025-09-03 DIAGNOSIS — I95.9 HYPOTENSION, UNSPECIFIED HYPOTENSION TYPE: Primary | ICD-10-CM

## 2025-09-03 DIAGNOSIS — I67.9 CEREBROVASCULAR DISEASE: ICD-10-CM

## 2025-09-03 DIAGNOSIS — Z95.0 PACEMAKER: ICD-10-CM

## 2025-09-03 PROCEDURE — 99348 HOME/RES VST EST LOW MDM 30: CPT | Performed by: CLINICAL NURSE SPECIALIST

## 2025-09-03 PROCEDURE — 1036F TOBACCO NON-USER: CPT | Performed by: CLINICAL NURSE SPECIALIST

## 2025-09-03 PROCEDURE — 3078F DIAST BP <80 MM HG: CPT | Performed by: CLINICAL NURSE SPECIALIST

## 2025-09-03 PROCEDURE — 3074F SYST BP LT 130 MM HG: CPT | Performed by: CLINICAL NURSE SPECIALIST

## 2025-09-03 PROCEDURE — 1159F MED LIST DOCD IN RCRD: CPT | Performed by: CLINICAL NURSE SPECIALIST
